# Patient Record
Sex: FEMALE | Race: WHITE | NOT HISPANIC OR LATINO | Employment: OTHER | URBAN - METROPOLITAN AREA
[De-identification: names, ages, dates, MRNs, and addresses within clinical notes are randomized per-mention and may not be internally consistent; named-entity substitution may affect disease eponyms.]

---

## 2017-01-03 ENCOUNTER — HOSPITAL ENCOUNTER (OUTPATIENT)
Dept: MAMMOGRAPHY | Facility: HOSPITAL | Age: 67
Discharge: HOME | End: 2017-01-03
Attending: FAMILY MEDICINE

## 2017-01-03 DIAGNOSIS — Z12.31 VISIT FOR SCREENING MAMMOGRAM: ICD-10-CM

## 2017-01-04 ENCOUNTER — RECORDS - HEALTHEAST (OUTPATIENT)
Dept: ADMINISTRATIVE | Facility: OTHER | Age: 67
End: 2017-01-04

## 2017-01-10 ENCOUNTER — RECORDS - HEALTHEAST (OUTPATIENT)
Dept: ADMINISTRATIVE | Facility: OTHER | Age: 67
End: 2017-01-10

## 2017-01-10 ENCOUNTER — AMBULATORY - HEALTHEAST (OUTPATIENT)
Dept: SCHEDULING | Facility: CLINIC | Age: 67
End: 2017-01-10

## 2017-01-10 DIAGNOSIS — K51.00: ICD-10-CM

## 2017-01-12 ENCOUNTER — RECORDS - HEALTHEAST (OUTPATIENT)
Dept: ADMINISTRATIVE | Facility: OTHER | Age: 67
End: 2017-01-12

## 2017-02-07 ENCOUNTER — OFFICE VISIT - HEALTHEAST (OUTPATIENT)
Dept: FAMILY MEDICINE | Facility: CLINIC | Age: 67
End: 2017-02-07

## 2017-02-07 ENCOUNTER — COMMUNICATION - HEALTHEAST (OUTPATIENT)
Dept: FAMILY MEDICINE | Facility: CLINIC | Age: 67
End: 2017-02-07

## 2017-02-07 DIAGNOSIS — K51.918 ULCERATIVE COLITIS WITH OTHER COMPLICATION, UNSPECIFIED LOCATION (H): ICD-10-CM

## 2017-02-07 DIAGNOSIS — Z01.818 PREOP EXAMINATION: ICD-10-CM

## 2017-02-07 ASSESSMENT — MIFFLIN-ST. JEOR: SCORE: 946.8

## 2017-02-08 ENCOUNTER — COMMUNICATION - HEALTHEAST (OUTPATIENT)
Dept: FAMILY MEDICINE | Facility: CLINIC | Age: 67
End: 2017-02-08

## 2017-02-09 ENCOUNTER — RECORDS - HEALTHEAST (OUTPATIENT)
Dept: ADMINISTRATIVE | Facility: OTHER | Age: 67
End: 2017-02-09

## 2017-02-12 ASSESSMENT — MIFFLIN-ST. JEOR
SCORE: 965.62
SCORE: 968.34

## 2017-02-13 ENCOUNTER — ANESTHESIA - HEALTHEAST (OUTPATIENT)
Dept: SURGERY | Facility: HOSPITAL | Age: 67
End: 2017-02-13

## 2017-02-13 ENCOUNTER — SURGERY - HEALTHEAST (OUTPATIENT)
Dept: SURGERY | Facility: HOSPITAL | Age: 67
End: 2017-02-13

## 2017-02-14 ENCOUNTER — HOME CARE/HOSPICE - HEALTHEAST (OUTPATIENT)
Dept: HOME HEALTH SERVICES | Facility: HOME HEALTH | Age: 67
End: 2017-02-14

## 2017-02-14 ASSESSMENT — MIFFLIN-ST. JEOR: SCORE: 981.95

## 2017-02-15 ASSESSMENT — MIFFLIN-ST. JEOR: SCORE: 983.76

## 2017-02-18 ASSESSMENT — MIFFLIN-ST. JEOR: SCORE: 965.16

## 2017-02-20 ENCOUNTER — COMMUNICATION - HEALTHEAST (OUTPATIENT)
Dept: FAMILY MEDICINE | Facility: CLINIC | Age: 67
End: 2017-02-20

## 2017-02-22 ENCOUNTER — COMMUNICATION - HEALTHEAST (OUTPATIENT)
Dept: FAMILY MEDICINE | Facility: CLINIC | Age: 67
End: 2017-02-22

## 2017-02-23 ENCOUNTER — HOME CARE/HOSPICE - HEALTHEAST (OUTPATIENT)
Dept: HOME HEALTH SERVICES | Facility: HOME HEALTH | Age: 67
End: 2017-02-23

## 2017-02-24 ENCOUNTER — HOME CARE/HOSPICE - HEALTHEAST (OUTPATIENT)
Dept: HOME HEALTH SERVICES | Facility: HOME HEALTH | Age: 67
End: 2017-02-24

## 2017-02-27 ENCOUNTER — HOME CARE/HOSPICE - HEALTHEAST (OUTPATIENT)
Dept: HOME HEALTH SERVICES | Facility: HOME HEALTH | Age: 67
End: 2017-02-27

## 2017-02-27 ENCOUNTER — OFFICE VISIT - HEALTHEAST (OUTPATIENT)
Dept: FAMILY MEDICINE | Facility: CLINIC | Age: 67
End: 2017-02-27

## 2017-02-27 DIAGNOSIS — K51.918 ULCERATIVE COLITIS WITH OTHER COMPLICATION, UNSPECIFIED LOCATION (H): ICD-10-CM

## 2017-02-27 DIAGNOSIS — Z09 HOSPITAL DISCHARGE FOLLOW-UP: ICD-10-CM

## 2017-02-27 ASSESSMENT — MIFFLIN-ST. JEOR: SCORE: 998.96

## 2017-03-02 ENCOUNTER — OFFICE VISIT - HEALTHEAST (OUTPATIENT)
Dept: WOUND CARE | Facility: HOSPITAL | Age: 67
End: 2017-03-02

## 2017-03-02 DIAGNOSIS — K51.00: ICD-10-CM

## 2017-03-04 ENCOUNTER — HOME CARE/HOSPICE - HEALTHEAST (OUTPATIENT)
Dept: HOME HEALTH SERVICES | Facility: HOME HEALTH | Age: 67
End: 2017-03-04

## 2017-03-06 ENCOUNTER — RECORDS - HEALTHEAST (OUTPATIENT)
Dept: ADMINISTRATIVE | Facility: OTHER | Age: 67
End: 2017-03-06

## 2017-03-07 ENCOUNTER — HOME CARE/HOSPICE - HEALTHEAST (OUTPATIENT)
Dept: HOME HEALTH SERVICES | Facility: HOME HEALTH | Age: 67
End: 2017-03-07

## 2017-03-10 ENCOUNTER — HOME CARE/HOSPICE - HEALTHEAST (OUTPATIENT)
Dept: HOME HEALTH SERVICES | Facility: HOME HEALTH | Age: 67
End: 2017-03-10

## 2017-03-17 ENCOUNTER — RECORDS - HEALTHEAST (OUTPATIENT)
Dept: ADMINISTRATIVE | Facility: OTHER | Age: 67
End: 2017-03-17

## 2017-03-20 ENCOUNTER — COMMUNICATION - HEALTHEAST (OUTPATIENT)
Dept: SCHEDULING | Facility: CLINIC | Age: 67
End: 2017-03-20

## 2017-03-31 ENCOUNTER — COMMUNICATION - HEALTHEAST (OUTPATIENT)
Dept: FAMILY MEDICINE | Facility: CLINIC | Age: 67
End: 2017-03-31

## 2017-03-31 DIAGNOSIS — B35.3 TINEA PEDIS, UNSPECIFIED LATERALITY: ICD-10-CM

## 2017-04-01 ENCOUNTER — COMMUNICATION - HEALTHEAST (OUTPATIENT)
Dept: FAMILY MEDICINE | Facility: CLINIC | Age: 67
End: 2017-04-01

## 2017-04-01 DIAGNOSIS — B35.3 TINEA PEDIS, UNSPECIFIED LATERALITY: ICD-10-CM

## 2017-04-10 ENCOUNTER — COMMUNICATION - HEALTHEAST (OUTPATIENT)
Dept: FAMILY MEDICINE | Facility: CLINIC | Age: 67
End: 2017-04-10

## 2017-04-11 ENCOUNTER — RECORDS - HEALTHEAST (OUTPATIENT)
Dept: ADMINISTRATIVE | Facility: OTHER | Age: 67
End: 2017-04-11

## 2017-04-13 ENCOUNTER — COMMUNICATION - HEALTHEAST (OUTPATIENT)
Dept: FAMILY MEDICINE | Facility: CLINIC | Age: 67
End: 2017-04-13

## 2017-04-17 ENCOUNTER — AMBULATORY - HEALTHEAST (OUTPATIENT)
Dept: LAB | Facility: CLINIC | Age: 67
End: 2017-04-17

## 2017-04-17 ENCOUNTER — COMMUNICATION - HEALTHEAST (OUTPATIENT)
Dept: FAMILY MEDICINE | Facility: CLINIC | Age: 67
End: 2017-04-17

## 2017-04-17 ENCOUNTER — RECORDS - HEALTHEAST (OUTPATIENT)
Dept: ADMINISTRATIVE | Facility: OTHER | Age: 67
End: 2017-04-17

## 2017-04-17 DIAGNOSIS — K51.018: ICD-10-CM

## 2017-04-17 DIAGNOSIS — R19.8 GI PROBLEM: ICD-10-CM

## 2017-04-19 ENCOUNTER — AMBULATORY - HEALTHEAST (OUTPATIENT)
Dept: LAB | Facility: CLINIC | Age: 67
End: 2017-04-19

## 2017-04-19 DIAGNOSIS — R19.8 GI PROBLEM: ICD-10-CM

## 2017-04-19 DIAGNOSIS — K51.018: ICD-10-CM

## 2017-05-11 ENCOUNTER — COMMUNICATION - HEALTHEAST (OUTPATIENT)
Dept: FAMILY MEDICINE | Facility: CLINIC | Age: 67
End: 2017-05-11

## 2017-05-15 ENCOUNTER — OFFICE VISIT - HEALTHEAST (OUTPATIENT)
Dept: FAMILY MEDICINE | Facility: CLINIC | Age: 67
End: 2017-05-15
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
## 2017-05-17 ENCOUNTER — COMMUNICATION - HEALTHEAST (OUTPATIENT)
Dept: FAMILY MEDICINE | Facility: CLINIC | Age: 67
End: 2017-05-17

## 2017-05-18 ENCOUNTER — RECORDS - HEALTHEAST (OUTPATIENT)
Dept: ADMINISTRATIVE | Facility: OTHER | Age: 67
End: 2017-05-18

## 2017-05-22 ENCOUNTER — COMMUNICATION - HEALTHEAST (OUTPATIENT)
Dept: FAMILY MEDICINE | Facility: CLINIC | Age: 67
End: 2017-05-22

## 2017-05-31 ENCOUNTER — AMBULATORY - HEALTHEAST (OUTPATIENT)
Dept: SCHEDULING | Facility: CLINIC | Age: 67
End: 2017-05-31

## 2017-05-31 DIAGNOSIS — Z09 FOLLOW UP: ICD-10-CM

## 2017-06-02 ENCOUNTER — COMMUNICATION - HEALTHEAST (OUTPATIENT)
Dept: FAMILY MEDICINE | Facility: CLINIC | Age: 67
End: 2017-06-02

## 2017-06-02 DIAGNOSIS — B35.3 TINEA PEDIS, UNSPECIFIED LATERALITY: ICD-10-CM

## 2017-06-06 ENCOUNTER — RECORDS - HEALTHEAST (OUTPATIENT)
Dept: ADMINISTRATIVE | Facility: OTHER | Age: 67
End: 2017-06-06

## 2017-06-06 ENCOUNTER — OFFICE VISIT - HEALTHEAST (OUTPATIENT)
Dept: WOUND CARE | Facility: HOSPITAL | Age: 67
End: 2017-06-06

## 2017-06-06 DIAGNOSIS — Z09 FOLLOW UP: ICD-10-CM

## 2017-12-08 ENCOUNTER — OFFICE VISIT - HEALTHEAST (OUTPATIENT)
Dept: FAMILY MEDICINE | Facility: CLINIC | Age: 67
End: 2017-12-08

## 2017-12-08 DIAGNOSIS — N61.0 MASTITIS OF RIGHT BREAST UNRELATED TO PREGNANCY OF BREASTFEEDING: ICD-10-CM

## 2017-12-08 ASSESSMENT — MIFFLIN-ST. JEOR: SCORE: 1040.91

## 2018-01-05 ENCOUNTER — COMMUNICATION - HEALTHEAST (OUTPATIENT)
Dept: FAMILY MEDICINE | Facility: CLINIC | Age: 68
End: 2018-01-05

## 2018-01-05 ENCOUNTER — COMMUNICATION - HEALTHEAST (OUTPATIENT)
Dept: SCHEDULING | Facility: CLINIC | Age: 68
End: 2018-01-05

## 2018-01-09 ENCOUNTER — COMMUNICATION - HEALTHEAST (OUTPATIENT)
Dept: FAMILY MEDICINE | Facility: CLINIC | Age: 68
End: 2018-01-09

## 2018-01-11 ENCOUNTER — RECORDS - HEALTHEAST (OUTPATIENT)
Dept: ADMINISTRATIVE | Facility: OTHER | Age: 68
End: 2018-01-11

## 2018-01-16 ENCOUNTER — RECORDS - HEALTHEAST (OUTPATIENT)
Dept: ADMINISTRATIVE | Facility: OTHER | Age: 68
End: 2018-01-16

## 2018-02-01 ENCOUNTER — OFFICE VISIT - HEALTHEAST (OUTPATIENT)
Dept: FAMILY MEDICINE | Facility: CLINIC | Age: 68
End: 2018-02-01

## 2018-02-01 ENCOUNTER — AMBULATORY - HEALTHEAST (OUTPATIENT)
Dept: SCHEDULING | Facility: CLINIC | Age: 68
End: 2018-02-01

## 2018-02-01 DIAGNOSIS — R05.3 CHRONIC COUGH: ICD-10-CM

## 2018-02-01 DIAGNOSIS — M85.80 OSTEOPENIA: ICD-10-CM

## 2018-02-01 ASSESSMENT — MIFFLIN-ST. JEOR: SCORE: 1035.24

## 2018-02-02 LAB
A ALTERNATA IGE QN: <0.35 KU/L
A FUMIGATUS IGE QN: <0.35 KU/L
C HERBARUM IGE QN: <0.35 KU/L
CAT DANDER IGG QN: <0.35 KU/L
COCKSFOOT IGE QN: <0.35 KU/L
COMMON RAGWEED IGE QN: <0.35 KU/L
COTTONWOOD IGE QN: <0.35 KU/L
D FARINAE IGE QN: <0.35 KU/L
D PTERONYSS IGE QN: <0.35 KU/L
DOG DANDER+EPITH IGE QN: <0.35 KU/L
KENT BLUE GRASS IGE QN: <0.35 KU/L
MAPLE IGE QN: <0.35 KU/L
ROACH IGE QN: <0.35 KU/L
SILVER BIRCH IGE QN: <0.35 KU/L
TIMOTHY IGE QN: <0.35 KU/L
TOTAL IGE - HISTORICAL: 24.3 KU/L (ref 0–100)
WHITE ASH IGE QN: <0.35 KU/L
WHITE ELM IGE QN: <0.35 KU/L
WHITE OAK IGE QN: <0.35 KU/L

## 2018-03-19 ENCOUNTER — COMMUNICATION - HEALTHEAST (OUTPATIENT)
Dept: FAMILY MEDICINE | Facility: CLINIC | Age: 68
End: 2018-03-19

## 2018-03-22 ENCOUNTER — COMMUNICATION - HEALTHEAST (OUTPATIENT)
Dept: FAMILY MEDICINE | Facility: CLINIC | Age: 68
End: 2018-03-22

## 2018-04-17 ENCOUNTER — RECORDS - HEALTHEAST (OUTPATIENT)
Dept: ADMINISTRATIVE | Facility: OTHER | Age: 68
End: 2018-04-17

## 2018-08-02 ENCOUNTER — OFFICE VISIT - HEALTHEAST (OUTPATIENT)
Dept: FAMILY MEDICINE | Facility: CLINIC | Age: 68
End: 2018-08-02

## 2018-08-02 DIAGNOSIS — R05.3 CHRONIC COUGH: ICD-10-CM

## 2018-08-02 ASSESSMENT — MIFFLIN-ST. JEOR: SCORE: 1040.91

## 2018-08-03 ENCOUNTER — AMBULATORY - HEALTHEAST (OUTPATIENT)
Dept: PULMONOLOGY | Facility: OTHER | Age: 68
End: 2018-08-03

## 2018-08-03 DIAGNOSIS — R05.9 COUGH: ICD-10-CM

## 2018-08-20 ENCOUNTER — COMMUNICATION - HEALTHEAST (OUTPATIENT)
Dept: FAMILY MEDICINE | Facility: CLINIC | Age: 68
End: 2018-08-20

## 2018-08-30 ENCOUNTER — COMMUNICATION - HEALTHEAST (OUTPATIENT)
Dept: FAMILY MEDICINE | Facility: CLINIC | Age: 68
End: 2018-08-30

## 2018-08-31 ENCOUNTER — COMMUNICATION - HEALTHEAST (OUTPATIENT)
Dept: FAMILY MEDICINE | Facility: CLINIC | Age: 68
End: 2018-08-31

## 2018-09-05 ENCOUNTER — COMMUNICATION - HEALTHEAST (OUTPATIENT)
Dept: SCHEDULING | Facility: CLINIC | Age: 68
End: 2018-09-05

## 2018-09-11 ENCOUNTER — OFFICE VISIT - HEALTHEAST (OUTPATIENT)
Dept: FAMILY MEDICINE | Facility: CLINIC | Age: 68
End: 2018-09-11

## 2018-09-11 DIAGNOSIS — K51.00 ULCERATIVE (CHRONIC) ENTEROCOLITIS (H): ICD-10-CM

## 2018-09-11 DIAGNOSIS — J18.9 PNEUMONIA: ICD-10-CM

## 2018-09-11 ASSESSMENT — MIFFLIN-ST. JEOR: SCORE: 1040.91

## 2018-09-17 ENCOUNTER — HOSPITAL ENCOUNTER (OUTPATIENT)
Dept: CT IMAGING | Facility: HOSPITAL | Age: 68
Discharge: HOME | End: 2018-09-17
Attending: FAMILY MEDICINE

## 2018-09-17 DIAGNOSIS — J18.9 PNEUMONIA: ICD-10-CM

## 2018-09-20 ENCOUNTER — COMMUNICATION - HEALTHEAST (OUTPATIENT)
Dept: FAMILY MEDICINE | Facility: CLINIC | Age: 68
End: 2018-09-20

## 2018-10-01 ENCOUNTER — COMMUNICATION - HEALTHEAST (OUTPATIENT)
Dept: FAMILY MEDICINE | Facility: CLINIC | Age: 68
End: 2018-10-01

## 2018-10-09 ENCOUNTER — OFFICE VISIT - HEALTHEAST (OUTPATIENT)
Dept: FAMILY MEDICINE | Facility: CLINIC | Age: 68
End: 2018-10-09

## 2018-10-09 DIAGNOSIS — J18.9 PNEUMONIA: ICD-10-CM

## 2018-10-09 DIAGNOSIS — K51.00 ULCERATIVE (CHRONIC) ENTEROCOLITIS (H): ICD-10-CM

## 2018-10-09 DIAGNOSIS — Z12.31 VISIT FOR SCREENING MAMMOGRAM: ICD-10-CM

## 2018-10-09 ASSESSMENT — MIFFLIN-ST. JEOR: SCORE: 1059.06

## 2018-11-01 ENCOUNTER — HOSPITAL ENCOUNTER (OUTPATIENT)
Dept: MAMMOGRAPHY | Facility: CLINIC | Age: 68
Discharge: HOME | End: 2018-11-01
Attending: FAMILY MEDICINE

## 2018-11-01 DIAGNOSIS — Z12.31 VISIT FOR SCREENING MAMMOGRAM: ICD-10-CM

## 2018-11-26 ENCOUNTER — COMMUNICATION - HEALTHEAST (OUTPATIENT)
Dept: FAMILY MEDICINE | Facility: CLINIC | Age: 68
End: 2018-11-26

## 2018-12-04 ENCOUNTER — OFFICE VISIT - HEALTHEAST (OUTPATIENT)
Dept: FAMILY MEDICINE | Facility: CLINIC | Age: 68
End: 2018-12-04

## 2018-12-04 DIAGNOSIS — J18.9 PNEUMONIA DUE TO INFECTIOUS ORGANISM, UNSPECIFIED LATERALITY, UNSPECIFIED PART OF LUNG: ICD-10-CM

## 2018-12-04 DIAGNOSIS — R05.3 CHRONIC COUGH: ICD-10-CM

## 2018-12-04 ASSESSMENT — MIFFLIN-ST. JEOR: SCORE: 1059.06

## 2018-12-06 ENCOUNTER — HOSPITAL ENCOUNTER (OUTPATIENT)
Dept: CT IMAGING | Facility: HOSPITAL | Age: 68
Discharge: HOME | End: 2018-12-06
Attending: FAMILY MEDICINE

## 2018-12-06 DIAGNOSIS — R05.3 CHRONIC COUGH: ICD-10-CM

## 2018-12-06 DIAGNOSIS — J18.9 PNEUMONIA DUE TO INFECTIOUS ORGANISM, UNSPECIFIED LATERALITY, UNSPECIFIED PART OF LUNG: ICD-10-CM

## 2018-12-10 ENCOUNTER — COMMUNICATION - HEALTHEAST (OUTPATIENT)
Dept: PULMONOLOGY | Facility: OTHER | Age: 68
End: 2018-12-10

## 2018-12-17 ENCOUNTER — COMMUNICATION - HEALTHEAST (OUTPATIENT)
Dept: SCHEDULING | Facility: CLINIC | Age: 68
End: 2018-12-17

## 2018-12-17 ENCOUNTER — OFFICE VISIT - HEALTHEAST (OUTPATIENT)
Dept: FAMILY MEDICINE | Facility: CLINIC | Age: 68
End: 2018-12-17

## 2018-12-17 DIAGNOSIS — L50.9 HIVES: ICD-10-CM

## 2018-12-17 DIAGNOSIS — R05.3 CHRONIC COUGH: ICD-10-CM

## 2018-12-17 ASSESSMENT — MIFFLIN-ST. JEOR: SCORE: 1059.06

## 2018-12-21 ENCOUNTER — HOSPITAL ENCOUNTER (OUTPATIENT)
Dept: RESPIRATORY THERAPY | Facility: HOSPITAL | Age: 68
Discharge: HOME | End: 2018-12-21
Attending: INTERNAL MEDICINE

## 2018-12-21 DIAGNOSIS — R05.9 COUGH: ICD-10-CM

## 2018-12-21 LAB — HGB BLD-MCNC: 12.2 G/DL (ref 12–16)

## 2019-01-03 ENCOUNTER — OFFICE VISIT - HEALTHEAST (OUTPATIENT)
Dept: PULMONOLOGY | Facility: OTHER | Age: 69
End: 2019-01-03

## 2019-01-03 ENCOUNTER — COMMUNICATION - HEALTHEAST (OUTPATIENT)
Dept: PULMONOLOGY | Facility: OTHER | Age: 69
End: 2019-01-03

## 2019-01-03 DIAGNOSIS — R05.3 CHRONIC COUGH: ICD-10-CM

## 2019-01-03 DIAGNOSIS — R13.10 DYSPHAGIA, UNSPECIFIED TYPE: ICD-10-CM

## 2019-01-03 DIAGNOSIS — J84.9 ILD (INTERSTITIAL LUNG DISEASE) (H): ICD-10-CM

## 2019-01-03 ASSESSMENT — MIFFLIN-ST. JEOR: SCORE: 1057.7

## 2019-01-04 ENCOUNTER — HOSPITAL ENCOUNTER (OUTPATIENT)
Dept: RADIOLOGY | Facility: HOSPITAL | Age: 69
Discharge: HOME | End: 2019-01-04
Attending: INTERNAL MEDICINE

## 2019-01-04 DIAGNOSIS — J84.9 ILD (INTERSTITIAL LUNG DISEASE) (H): ICD-10-CM

## 2019-01-04 DIAGNOSIS — R05.3 CHRONIC COUGH: ICD-10-CM

## 2019-01-04 DIAGNOSIS — R13.10 DYSPHAGIA, UNSPECIFIED TYPE: ICD-10-CM

## 2019-01-08 ENCOUNTER — COMMUNICATION - HEALTHEAST (OUTPATIENT)
Dept: INTENSIVE CARE | Facility: CLINIC | Age: 69
End: 2019-01-08

## 2019-01-17 ENCOUNTER — AMBULATORY - HEALTHEAST (OUTPATIENT)
Dept: LAB | Facility: CLINIC | Age: 69
End: 2019-01-17

## 2019-01-17 DIAGNOSIS — J84.9 ILD (INTERSTITIAL LUNG DISEASE) (H): ICD-10-CM

## 2019-01-17 LAB
C REACTIVE PROTEIN LHE: 0.6 MG/DL (ref 0–0.8)
CCP AB SER IA-ACNC: 0.7 U/ML
RHEUMATOID FACT SERPL-ACNC: <15 IU/ML (ref 0–30)

## 2019-01-21 LAB — ANA SER QL: 1 U

## 2019-01-22 LAB
DNA (DS) ANTIBODY - HISTORICAL: 16 IU
SCL-70 AUTOANTIBODIES - HISTORICAL: 0 EU
SS-A/RO AUTOANTIBODIES - HISTORICAL: 2 EU
SS-B/LA AUTOANTIBODIES - HISTORICAL: 0 EU

## 2019-01-23 ENCOUNTER — COMMUNICATION - HEALTHEAST (OUTPATIENT)
Dept: INTENSIVE CARE | Facility: CLINIC | Age: 69
End: 2019-01-23

## 2019-01-23 ENCOUNTER — COMMUNICATION - HEALTHEAST (OUTPATIENT)
Dept: FAMILY MEDICINE | Facility: CLINIC | Age: 69
End: 2019-01-23

## 2019-01-23 LAB
BACTERIA SPEC CULT: NORMAL
GRAM STAIN RESULT: NORMAL

## 2019-01-24 ENCOUNTER — COMMUNICATION - HEALTHEAST (OUTPATIENT)
Dept: PULMONOLOGY | Facility: OTHER | Age: 69
End: 2019-01-24

## 2019-01-25 ENCOUNTER — COMMUNICATION - HEALTHEAST (OUTPATIENT)
Dept: INTENSIVE CARE | Facility: CLINIC | Age: 69
End: 2019-01-25

## 2019-01-28 LAB — BACTERIA SPEC CULT: NORMAL

## 2019-01-29 ENCOUNTER — AMBULATORY - HEALTHEAST (OUTPATIENT)
Dept: SCHEDULING | Facility: CLINIC | Age: 69
End: 2019-01-29

## 2019-01-29 DIAGNOSIS — Z00.00 EVALUATION BY MEDICAL SERVICE REQUIRED: ICD-10-CM

## 2019-02-05 ENCOUNTER — OFFICE VISIT - HEALTHEAST (OUTPATIENT)
Dept: WOUND CARE | Facility: HOSPITAL | Age: 69
End: 2019-02-05

## 2019-02-05 DIAGNOSIS — Z00.00 EVALUATION BY MEDICAL SERVICE REQUIRED: ICD-10-CM

## 2019-02-21 ENCOUNTER — COMMUNICATION - HEALTHEAST (OUTPATIENT)
Dept: FAMILY MEDICINE | Facility: CLINIC | Age: 69
End: 2019-02-21

## 2019-03-05 LAB
ACID FAST STN SPEC QL: NORMAL
BACTERIA SPEC CULT: NORMAL

## 2019-03-25 ENCOUNTER — HOSPITAL ENCOUNTER (OUTPATIENT)
Dept: CT IMAGING | Facility: HOSPITAL | Age: 69
Discharge: HOME | End: 2019-03-25
Attending: INTERNAL MEDICINE

## 2019-03-25 DIAGNOSIS — J84.9 ILD (INTERSTITIAL LUNG DISEASE) (H): ICD-10-CM

## 2019-04-01 ENCOUNTER — COMMUNICATION - HEALTHEAST (OUTPATIENT)
Dept: FAMILY MEDICINE | Facility: CLINIC | Age: 69
End: 2019-04-01

## 2019-04-02 ENCOUNTER — COMMUNICATION - HEALTHEAST (OUTPATIENT)
Dept: INTENSIVE CARE | Facility: CLINIC | Age: 69
End: 2019-04-02

## 2019-04-23 ENCOUNTER — OFFICE VISIT - HEALTHEAST (OUTPATIENT)
Dept: FAMILY MEDICINE | Facility: CLINIC | Age: 69
End: 2019-04-23

## 2019-04-23 ENCOUNTER — COMMUNICATION - HEALTHEAST (OUTPATIENT)
Dept: FAMILY MEDICINE | Facility: CLINIC | Age: 69
End: 2019-04-23

## 2019-04-23 DIAGNOSIS — K51.919 ULCERATIVE COLITIS WITH COMPLICATION, UNSPECIFIED LOCATION (H): ICD-10-CM

## 2019-04-23 DIAGNOSIS — Z90.49 H/O COLECTOMY: ICD-10-CM

## 2019-04-23 DIAGNOSIS — Z00.00 MEDICARE ANNUAL WELLNESS VISIT, SUBSEQUENT: ICD-10-CM

## 2019-04-23 DIAGNOSIS — R06.00 DYSPNEA, UNSPECIFIED TYPE: ICD-10-CM

## 2019-04-23 DIAGNOSIS — R05.3 CHRONIC COUGH: ICD-10-CM

## 2019-04-23 DIAGNOSIS — M81.8 OTHER OSTEOPOROSIS WITHOUT CURRENT PATHOLOGICAL FRACTURE: ICD-10-CM

## 2019-04-23 LAB
ALBUMIN SERPL-MCNC: 3.7 G/DL (ref 3.5–5)
ALP SERPL-CCNC: 78 U/L (ref 45–120)
ALT SERPL W P-5'-P-CCNC: 12 U/L (ref 0–45)
ANION GAP SERPL CALCULATED.3IONS-SCNC: 10 MMOL/L (ref 5–18)
AST SERPL W P-5'-P-CCNC: 16 U/L (ref 0–40)
BILIRUB SERPL-MCNC: 0.3 MG/DL (ref 0–1)
BUN SERPL-MCNC: 9 MG/DL (ref 8–22)
CALCIUM SERPL-MCNC: 9.3 MG/DL (ref 8.5–10.5)
CALCIUM SERPL-MCNC: 9.4 MG/DL (ref 8.5–10.5)
CHLORIDE BLD-SCNC: 106 MMOL/L (ref 98–107)
CHOLEST SERPL-MCNC: 154 MG/DL
CO2 SERPL-SCNC: 24 MMOL/L (ref 22–31)
CREAT SERPL-MCNC: 0.74 MG/DL (ref 0.6–1.1)
CREAT SERPL-MCNC: 0.75 MG/DL (ref 0.6–1.1)
ERYTHROCYTE [DISTWIDTH] IN BLOOD BY AUTOMATED COUNT: 12.4 % (ref 11–14.5)
FASTING STATUS PATIENT QL REPORTED: YES
GFR SERPL CREATININE-BSD FRML MDRD: >60 ML/MIN/1.73M2
GFR SERPL CREATININE-BSD FRML MDRD: >60 ML/MIN/1.73M2
GLUCOSE BLD-MCNC: 83 MG/DL (ref 70–125)
HBA1C MFR BLD: 5.8 % (ref 3.5–6)
HCT VFR BLD AUTO: 38.1 % (ref 35–47)
HDLC SERPL-MCNC: 47 MG/DL
HGB BLD-MCNC: 12.6 G/DL (ref 12–16)
LDLC SERPL CALC-MCNC: 87 MG/DL
MCH RBC QN AUTO: 29 PG (ref 27–34)
MCHC RBC AUTO-ENTMCNC: 33 G/DL (ref 32–36)
MCV RBC AUTO: 88 FL (ref 80–100)
PHOSPHATE SERPL-MCNC: 3 MG/DL (ref 2.5–4.5)
PLATELET # BLD AUTO: 233 THOU/UL (ref 140–440)
PMV BLD AUTO: 6.4 FL (ref 7–10)
POTASSIUM BLD-SCNC: 3.8 MMOL/L (ref 3.5–5)
PROT SERPL-MCNC: 6.8 G/DL (ref 6–8)
PTH-INTACT SERPL-MCNC: 92 PG/ML (ref 10–86)
RBC # BLD AUTO: 4.35 MILL/UL (ref 3.8–5.4)
SODIUM SERPL-SCNC: 140 MMOL/L (ref 136–145)
TRIGL SERPL-MCNC: 98 MG/DL
TSH SERPL DL<=0.005 MIU/L-ACNC: 1.3 UIU/ML (ref 0.3–5)
VIT B12 SERPL-MCNC: 982 PG/ML (ref 213–816)
WBC: 4.4 THOU/UL (ref 4–11)

## 2019-04-23 ASSESSMENT — MIFFLIN-ST. JEOR: SCORE: 1014.84

## 2019-04-24 LAB
25(OH)D3 SERPL-MCNC: 35.4 NG/ML (ref 30–80)
25(OH)D3 SERPL-MCNC: 35.4 NG/ML (ref 30–80)

## 2019-04-26 ENCOUNTER — OFFICE VISIT - HEALTHEAST (OUTPATIENT)
Dept: PULMONOLOGY | Facility: OTHER | Age: 69
End: 2019-04-26

## 2019-04-26 DIAGNOSIS — R05.3 CHRONIC COUGH: ICD-10-CM

## 2019-05-02 ENCOUNTER — RECORDS - HEALTHEAST (OUTPATIENT)
Dept: ADMINISTRATIVE | Facility: OTHER | Age: 69
End: 2019-05-02

## 2019-05-09 ENCOUNTER — HOSPITAL ENCOUNTER (OUTPATIENT)
Dept: CARDIOLOGY | Facility: HOSPITAL | Age: 69
Discharge: HOME | End: 2019-05-09
Attending: FAMILY MEDICINE

## 2019-05-09 LAB
CV STRESS CURRENT BP HE: NORMAL
CV STRESS CURRENT HR HE: 106
CV STRESS CURRENT HR HE: 109
CV STRESS CURRENT HR HE: 115
CV STRESS CURRENT HR HE: 124
CV STRESS CURRENT HR HE: 124
CV STRESS CURRENT HR HE: 125
CV STRESS CURRENT HR HE: 125
CV STRESS CURRENT HR HE: 126
CV STRESS CURRENT HR HE: 128
CV STRESS CURRENT HR HE: 128
CV STRESS CURRENT HR HE: 133
CV STRESS CURRENT HR HE: 135
CV STRESS CURRENT HR HE: 68
CV STRESS CURRENT HR HE: 69
CV STRESS CURRENT HR HE: 69
CV STRESS CURRENT HR HE: 71
CV STRESS CURRENT HR HE: 71
CV STRESS CURRENT HR HE: 72
CV STRESS CURRENT HR HE: 73
CV STRESS CURRENT HR HE: 77
CV STRESS CURRENT HR HE: 80
CV STRESS CURRENT HR HE: 83
CV STRESS CURRENT HR HE: 99
CV STRESS DEVIATION TIME HE: NORMAL
CV STRESS ECHO PERCENT HR HE: NORMAL
CV STRESS EXERCISE STAGE HE: NORMAL
CV STRESS EXERCISE STAGE REACHED HE: NORMAL
CV STRESS FINAL RESTING BP HE: NORMAL
CV STRESS FINAL RESTING HR HE: 73
CV STRESS MAX HR HE: 145
CV STRESS MAX TREADMILL GRADE HE: 12
CV STRESS MAX TREADMILL SPEED HE: 2.5
CV STRESS PEAK DIA BP HE: NORMAL
CV STRESS PEAK SYS BP HE: NORMAL
CV STRESS PHASE HE: NORMAL
CV STRESS PROTOCOL HE: NORMAL
CV STRESS RESTING PT POSITION HE: NORMAL
CV STRESS RESTING PT POSITION HE: NORMAL
CV STRESS ST DEVIATION AMOUNT HE: NORMAL
CV STRESS ST DEVIATION ELEVATION HE: NORMAL
CV STRESS ST EVELATION AMOUNT HE: NORMAL
CV STRESS TEST TYPE HE: NORMAL
CV STRESS TOTAL STAGE TIME MIN 1 HE: NORMAL
STRESS ECHO BASELINE BP: NORMAL
STRESS ECHO BASELINE HR: 74
STRESS ECHO CALCULATED PERCENT HR: 96 %
STRESS ECHO LAST STRESS BP: NORMAL
STRESS ECHO LAST STRESS HR: 126
STRESS ECHO POST ESTIMATED WORKLOAD: 7.1
STRESS ECHO POST EXERCISE DUR MIN: 5
STRESS ECHO POST EXERCISE DUR SEC: 55
STRESS ECHO TARGET HR: 144.96

## 2019-05-10 ENCOUNTER — COMMUNICATION - HEALTHEAST (OUTPATIENT)
Dept: FAMILY MEDICINE | Facility: CLINIC | Age: 69
End: 2019-05-10

## 2019-05-23 ENCOUNTER — COMMUNICATION - HEALTHEAST (OUTPATIENT)
Dept: FAMILY MEDICINE | Facility: CLINIC | Age: 69
End: 2019-05-23

## 2019-05-23 DIAGNOSIS — M81.0 SENILE OSTEOPOROSIS: ICD-10-CM

## 2019-10-10 ENCOUNTER — AMBULATORY - HEALTHEAST (OUTPATIENT)
Dept: NURSING | Facility: CLINIC | Age: 69
End: 2019-10-10

## 2019-10-10 DIAGNOSIS — Z23 FLU VACCINE NEED: ICD-10-CM

## 2019-10-17 ENCOUNTER — COMMUNICATION - HEALTHEAST (OUTPATIENT)
Dept: SCHEDULING | Facility: CLINIC | Age: 69
End: 2019-10-17
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
## 2019-11-08 ENCOUNTER — HEALTH MAINTENANCE LETTER (OUTPATIENT)
Age: 69
End: 2019-11-08

## 2019-11-18 ENCOUNTER — RECORDS - HEALTHEAST (OUTPATIENT)
Dept: MAMMOGRAPHY | Facility: CLINIC | Age: 69
End: 2019-11-18

## 2019-11-18 DIAGNOSIS — Z12.31 ENCOUNTER FOR SCREENING MAMMOGRAM FOR MALIGNANT NEOPLASM OF BREAST: ICD-10-CM

## 2020-01-30 ENCOUNTER — COMMUNICATION - HEALTHEAST (OUTPATIENT)
Dept: FAMILY MEDICINE | Facility: CLINIC | Age: 70
End: 2020-01-30

## 2020-01-30 ENCOUNTER — COMMUNICATION - HEALTHEAST (OUTPATIENT)
Dept: PULMONOLOGY | Facility: OTHER | Age: 70
End: 2020-01-30

## 2020-02-23 ENCOUNTER — HEALTH MAINTENANCE LETTER (OUTPATIENT)
Age: 70
End: 2020-02-23

## 2020-03-03 ENCOUNTER — COMMUNICATION - HEALTHEAST (OUTPATIENT)
Dept: SCHEDULING | Facility: CLINIC | Age: 70
End: 2020-03-03

## 2020-03-04 ENCOUNTER — COMMUNICATION - HEALTHEAST (OUTPATIENT)
Dept: FAMILY MEDICINE | Facility: CLINIC | Age: 70
End: 2020-03-04

## 2020-03-10 ENCOUNTER — COMMUNICATION - HEALTHEAST (OUTPATIENT)
Dept: FAMILY MEDICINE | Facility: CLINIC | Age: 70
End: 2020-03-10

## 2020-03-10 ENCOUNTER — OFFICE VISIT - HEALTHEAST (OUTPATIENT)
Dept: FAMILY MEDICINE | Facility: CLINIC | Age: 70
End: 2020-03-10

## 2020-03-10 DIAGNOSIS — Z09 HOSPITAL DISCHARGE FOLLOW-UP: ICD-10-CM

## 2020-03-10 DIAGNOSIS — N17.9 ACUTE KIDNEY INJURY (H): ICD-10-CM

## 2020-03-10 DIAGNOSIS — E86.0 DEHYDRATION: ICD-10-CM

## 2020-03-10 DIAGNOSIS — R19.7 DIARRHEA OF PRESUMED INFECTIOUS ORIGIN: ICD-10-CM

## 2020-03-10 DIAGNOSIS — K51.919 ULCERATIVE COLITIS WITH COMPLICATION, UNSPECIFIED LOCATION (H): ICD-10-CM

## 2020-03-10 LAB
ALBUMIN SERPL-MCNC: 3.2 G/DL (ref 3.5–5)
ALP SERPL-CCNC: 64 U/L (ref 45–120)
ALT SERPL W P-5'-P-CCNC: 68 U/L (ref 0–45)
ANION GAP SERPL CALCULATED.3IONS-SCNC: 6 MMOL/L (ref 5–18)
AST SERPL W P-5'-P-CCNC: 26 U/L (ref 0–40)
BILIRUB SERPL-MCNC: 0.3 MG/DL (ref 0–1)
BUN SERPL-MCNC: 15 MG/DL (ref 8–28)
CALCIUM SERPL-MCNC: 8.8 MG/DL (ref 8.5–10.5)
CHLORIDE BLD-SCNC: 108 MMOL/L (ref 98–107)
CO2 SERPL-SCNC: 23 MMOL/L (ref 22–31)
CREAT SERPL-MCNC: 0.82 MG/DL (ref 0.6–1.1)
GFR SERPL CREATININE-BSD FRML MDRD: >60 ML/MIN/1.73M2
GLUCOSE BLD-MCNC: 88 MG/DL (ref 70–125)
POTASSIUM BLD-SCNC: 4 MMOL/L (ref 3.5–5)
PROT SERPL-MCNC: 6.3 G/DL (ref 6–8)
SODIUM SERPL-SCNC: 137 MMOL/L (ref 136–145)

## 2020-03-10 ASSESSMENT — MIFFLIN-ST. JEOR: SCORE: 1037.52

## 2020-04-07 ENCOUNTER — RECORDS - HEALTHEAST (OUTPATIENT)
Dept: ADMINISTRATIVE | Facility: OTHER | Age: 70
End: 2020-04-07

## 2020-05-14 ENCOUNTER — COMMUNICATION - HEALTHEAST (OUTPATIENT)
Dept: FAMILY MEDICINE | Facility: CLINIC | Age: 70
End: 2020-05-14

## 2020-05-26 ENCOUNTER — COMMUNICATION - HEALTHEAST (OUTPATIENT)
Dept: PULMONOLOGY | Facility: OTHER | Age: 70
End: 2020-05-26

## 2020-05-28 ENCOUNTER — COMMUNICATION - HEALTHEAST (OUTPATIENT)
Dept: SCHEDULING | Facility: CLINIC | Age: 70
End: 2020-05-28

## 2020-05-28 ENCOUNTER — OFFICE VISIT - HEALTHEAST (OUTPATIENT)
Dept: FAMILY MEDICINE | Facility: CLINIC | Age: 70
End: 2020-05-28

## 2020-05-28 DIAGNOSIS — R42 DIZZINESS: ICD-10-CM

## 2020-05-28 DIAGNOSIS — R42 LIGHTHEADEDNESS: ICD-10-CM

## 2020-05-28 DIAGNOSIS — H81.13 BENIGN PAROXYSMAL POSITIONAL VERTIGO DUE TO BILATERAL VESTIBULAR DISORDER: ICD-10-CM

## 2020-05-28 LAB
ALBUMIN SERPL-MCNC: 3.8 G/DL (ref 3.5–5)
ALP SERPL-CCNC: 73 U/L (ref 45–120)
ALT SERPL W P-5'-P-CCNC: 10 U/L (ref 0–45)
ANION GAP SERPL CALCULATED.3IONS-SCNC: 10 MMOL/L (ref 5–18)
AST SERPL W P-5'-P-CCNC: 17 U/L (ref 0–40)
BASOPHILS # BLD AUTO: 0 THOU/UL (ref 0–0.2)
BASOPHILS NFR BLD AUTO: 1 % (ref 0–2)
BILIRUB SERPL-MCNC: 0.6 MG/DL (ref 0–1)
BUN SERPL-MCNC: 13 MG/DL (ref 8–28)
CALCIUM SERPL-MCNC: 9.7 MG/DL (ref 8.5–10.5)
CHLORIDE BLD-SCNC: 106 MMOL/L (ref 98–107)
CO2 SERPL-SCNC: 21 MMOL/L (ref 22–31)
CREAT SERPL-MCNC: 0.78 MG/DL (ref 0.6–1.1)
EOSINOPHIL # BLD AUTO: 0.1 THOU/UL (ref 0–0.4)
EOSINOPHIL NFR BLD AUTO: 2 % (ref 0–6)
ERYTHROCYTE [DISTWIDTH] IN BLOOD BY AUTOMATED COUNT: 13.4 % (ref 11–14.5)
GFR SERPL CREATININE-BSD FRML MDRD: >60 ML/MIN/1.73M2
GLUCOSE BLD-MCNC: 94 MG/DL (ref 70–125)
HCT VFR BLD AUTO: 39.2 % (ref 35–47)
HGB BLD-MCNC: 13.2 G/DL (ref 12–16)
LYMPHOCYTES # BLD AUTO: 1.2 THOU/UL (ref 0.8–4.4)
LYMPHOCYTES NFR BLD AUTO: 21 % (ref 20–40)
MCH RBC QN AUTO: 30 PG (ref 27–34)
MCHC RBC AUTO-ENTMCNC: 33.8 G/DL (ref 32–36)
MCV RBC AUTO: 89 FL (ref 80–100)
MONOCYTES # BLD AUTO: 0.4 THOU/UL (ref 0–0.9)
MONOCYTES NFR BLD AUTO: 6 % (ref 2–10)
NEUTROPHILS # BLD AUTO: 4.2 THOU/UL (ref 2–7.7)
NEUTROPHILS NFR BLD AUTO: 71 % (ref 50–70)
PLATELET # BLD AUTO: 243 THOU/UL (ref 140–440)
PMV BLD AUTO: 6.8 FL (ref 7–10)
POTASSIUM BLD-SCNC: 4.1 MMOL/L (ref 3.5–5)
PROT SERPL-MCNC: 7.6 G/DL (ref 6–8)
RBC # BLD AUTO: 4.42 MILL/UL (ref 3.8–5.4)
SODIUM SERPL-SCNC: 137 MMOL/L (ref 136–145)
WBC: 5.9 THOU/UL (ref 4–11)

## 2020-05-28 ASSESSMENT — MIFFLIN-ST. JEOR: SCORE: 1042.06

## 2020-05-31 ENCOUNTER — AMBULATORY - HEALTHEAST (OUTPATIENT)
Dept: INTENSIVE CARE | Facility: CLINIC | Age: 70
End: 2020-05-31

## 2020-06-02 ENCOUNTER — OFFICE VISIT - HEALTHEAST (OUTPATIENT)
Dept: PHYSICAL THERAPY | Facility: REHABILITATION | Age: 70
End: 2020-06-02

## 2020-06-02 DIAGNOSIS — H81.11 BPPV (BENIGN PAROXYSMAL POSITIONAL VERTIGO), RIGHT: ICD-10-CM

## 2020-06-05 ENCOUNTER — OFFICE VISIT - HEALTHEAST (OUTPATIENT)
Dept: PHYSICAL THERAPY | Facility: REHABILITATION | Age: 70
End: 2020-06-05

## 2020-06-05 DIAGNOSIS — H81.11 BPPV (BENIGN PAROXYSMAL POSITIONAL VERTIGO), RIGHT: ICD-10-CM

## 2020-07-08 ENCOUNTER — COMMUNICATION - HEALTHEAST (OUTPATIENT)
Dept: FAMILY MEDICINE | Facility: CLINIC | Age: 70
End: 2020-07-08

## 2020-07-08 DIAGNOSIS — M81.0 SENILE OSTEOPOROSIS: ICD-10-CM

## 2020-08-10 ENCOUNTER — COMMUNICATION - HEALTHEAST (OUTPATIENT)
Dept: FAMILY MEDICINE | Facility: CLINIC | Age: 70
End: 2020-08-10

## 2020-08-26 ENCOUNTER — COMMUNICATION - HEALTHEAST (OUTPATIENT)
Dept: FAMILY MEDICINE | Facility: CLINIC | Age: 70
End: 2020-08-26

## 2020-08-28 ENCOUNTER — RECORDS - HEALTHEAST (OUTPATIENT)
Dept: ADMINISTRATIVE | Facility: OTHER | Age: 70
End: 2020-08-28

## 2020-10-13 ENCOUNTER — COMMUNICATION - HEALTHEAST (OUTPATIENT)
Dept: FAMILY MEDICINE | Facility: CLINIC | Age: 70
End: 2020-10-13

## 2020-12-06 ENCOUNTER — HEALTH MAINTENANCE LETTER (OUTPATIENT)
Age: 70
End: 2020-12-06

## 2021-01-21 ENCOUNTER — RECORDS - HEALTHEAST (OUTPATIENT)
Dept: MAMMOGRAPHY | Facility: CLINIC | Age: 71
End: 2021-01-21

## 2021-01-21 DIAGNOSIS — Z12.31 ENCOUNTER FOR SCREENING MAMMOGRAM FOR MALIGNANT NEOPLASM OF BREAST: ICD-10-CM

## 2021-03-08 ENCOUNTER — COMMUNICATION - HEALTHEAST (OUTPATIENT)
Dept: FAMILY MEDICINE | Facility: CLINIC | Age: 71
End: 2021-03-08

## 2021-03-10 ENCOUNTER — COMMUNICATION - HEALTHEAST (OUTPATIENT)
Dept: FAMILY MEDICINE | Facility: CLINIC | Age: 71
End: 2021-03-10

## 2021-03-12 ENCOUNTER — COMMUNICATION - HEALTHEAST (OUTPATIENT)
Dept: FAMILY MEDICINE | Facility: CLINIC | Age: 71
End: 2021-03-12

## 2021-03-12 ENCOUNTER — COMMUNICATION - HEALTHEAST (OUTPATIENT)
Dept: PULMONOLOGY | Facility: OTHER | Age: 71
End: 2021-03-12

## 2021-03-18 ENCOUNTER — AMBULATORY - HEALTHEAST (OUTPATIENT)
Dept: SCHEDULING | Facility: CLINIC | Age: 71
End: 2021-03-18

## 2021-03-18 ENCOUNTER — OFFICE VISIT - HEALTHEAST (OUTPATIENT)
Dept: FAMILY MEDICINE | Facility: CLINIC | Age: 71
End: 2021-03-18

## 2021-03-18 DIAGNOSIS — J47.9 BRONCHIECTASIS WITHOUT COMPLICATION (H): ICD-10-CM

## 2021-03-18 DIAGNOSIS — Z78.0 POSTMENOPAUSAL: ICD-10-CM

## 2021-03-18 DIAGNOSIS — M81.8 OTHER OSTEOPOROSIS WITHOUT CURRENT PATHOLOGICAL FRACTURE: ICD-10-CM

## 2021-03-18 DIAGNOSIS — K51.919 ULCERATIVE COLITIS WITH COMPLICATION, UNSPECIFIED LOCATION (H): ICD-10-CM

## 2021-03-18 DIAGNOSIS — Z00.00 MEDICARE ANNUAL WELLNESS VISIT, SUBSEQUENT: ICD-10-CM

## 2021-03-18 DIAGNOSIS — Z13.220 ENCOUNTER FOR SCREENING FOR LIPOID DISORDERS: ICD-10-CM

## 2021-03-18 DIAGNOSIS — D64.9 NORMOCYTIC ANEMIA: ICD-10-CM

## 2021-03-18 LAB
ALBUMIN SERPL-MCNC: 3.8 G/DL (ref 3.5–5)
ALP SERPL-CCNC: 61 U/L (ref 45–120)
ALT SERPL W P-5'-P-CCNC: 12 U/L (ref 0–45)
ANION GAP SERPL CALCULATED.3IONS-SCNC: 10 MMOL/L (ref 5–18)
AST SERPL W P-5'-P-CCNC: 20 U/L (ref 0–40)
BILIRUB SERPL-MCNC: 0.6 MG/DL (ref 0–1)
BUN SERPL-MCNC: 16 MG/DL (ref 8–28)
CALCIUM SERPL-MCNC: 9.1 MG/DL (ref 8.5–10.5)
CHLORIDE BLD-SCNC: 108 MMOL/L (ref 98–107)
CHOLEST SERPL-MCNC: 187 MG/DL
CO2 SERPL-SCNC: 24 MMOL/L (ref 22–31)
CREAT SERPL-MCNC: 0.84 MG/DL (ref 0.6–1.1)
ERYTHROCYTE [DISTWIDTH] IN BLOOD BY AUTOMATED COUNT: 12.7 % (ref 11–14.5)
FASTING STATUS PATIENT QL REPORTED: YES
GFR SERPL CREATININE-BSD FRML MDRD: >60 ML/MIN/1.73M2
GLUCOSE BLD-MCNC: 83 MG/DL (ref 70–125)
HCT VFR BLD AUTO: 40.8 % (ref 35–47)
HDLC SERPL-MCNC: 62 MG/DL
HGB BLD-MCNC: 13.1 G/DL (ref 12–16)
LDLC SERPL CALC-MCNC: 112 MG/DL
MCH RBC QN AUTO: 29.1 PG (ref 27–34)
MCHC RBC AUTO-ENTMCNC: 32.1 G/DL (ref 32–36)
MCV RBC AUTO: 91 FL (ref 80–100)
PLATELET # BLD AUTO: 172 THOU/UL (ref 140–440)
PMV BLD AUTO: 8.8 FL (ref 7–10)
POTASSIUM BLD-SCNC: 4.4 MMOL/L (ref 3.5–5)
PROT SERPL-MCNC: 6.6 G/DL (ref 6–8)
RBC # BLD AUTO: 4.5 MILL/UL (ref 3.8–5.4)
SODIUM SERPL-SCNC: 142 MMOL/L (ref 136–145)
TRIGL SERPL-MCNC: 63 MG/DL
WBC: 4.4 THOU/UL (ref 4–11)

## 2021-03-18 ASSESSMENT — MIFFLIN-ST. JEOR: SCORE: 1090.14

## 2021-03-19 LAB
25(OH)D3 SERPL-MCNC: 30.1 NG/ML (ref 30–80)
25(OH)D3 SERPL-MCNC: 30.1 NG/ML (ref 30–80)

## 2021-04-11 ENCOUNTER — HEALTH MAINTENANCE LETTER (OUTPATIENT)
Age: 71
End: 2021-04-11

## 2021-05-02 ENCOUNTER — RECORDS - HEALTHEAST (OUTPATIENT)
Dept: ADMINISTRATIVE | Facility: OTHER | Age: 71
End: 2021-05-02

## 2021-05-19 ENCOUNTER — RECORDS - HEALTHEAST (OUTPATIENT)
Dept: ADMINISTRATIVE | Facility: OTHER | Age: 71
End: 2021-05-19

## 2021-05-24 ENCOUNTER — COMMUNICATION - HEALTHEAST (OUTPATIENT)
Dept: FAMILY MEDICINE | Facility: CLINIC | Age: 71
End: 2021-05-24

## 2021-05-24 DIAGNOSIS — K51.918: ICD-10-CM

## 2021-05-24 DIAGNOSIS — K51.919 ULCERATIVE COLITIS WITH COMPLICATION, UNSPECIFIED LOCATION (H): ICD-10-CM

## 2021-05-24 DIAGNOSIS — Z90.49 STATUS POST COLON RESECTION: ICD-10-CM

## 2021-05-25 ENCOUNTER — RECORDS - HEALTHEAST (OUTPATIENT)
Dept: ADMINISTRATIVE | Facility: OTHER | Age: 71
End: 2021-05-25

## 2021-05-27 NOTE — TELEPHONE ENCOUNTER
Called the patient to review her CT scan results - which is suggestive of an organizing PNA given the migratory opacities, especially given her increased risk with IBD.     Her cough is stable. I again told her what I thought was the likely diagnosis, and the options of bronchoscopy to rule out infection and try to establish a definitive diagnosis, versus empiric prednisone understanding the risks of worsening an un-identified infection (she previously declined to do either of these) and in some cases watchful waiting given the mild symptoms and normal lung function testing.     She continues to have a great deal of anxiety and continues to search for another explanation/diagnosis - she asked if this was IPF, which based on her imaging I told her it was not consistent with this. She raised the question of another opinion, and I told her I can certainly send any test results/notes to whomever she requests.    She made to decisions based on this call and would like to discuss again at our visit 4/26.        Tonya Mark MD  Pulmonary and Critical Care Medicine  [facility]  Office: [phone]  Pager: [phone]

## 2021-05-28 NOTE — PATIENT INSTRUCTIONS - HE
It was a pleasure seeing you today.    Please let me know how your visit to [place] [facility] goes, and if you would like to follow-up here again.        Tonya Mark MD  Pulmonary and Critical Care Medicine  [facility]  Office: [phone]  Pager: [phone]

## 2021-05-28 NOTE — TELEPHONE ENCOUNTER
----- Message from Giovanna Parrish MD sent at 5/10/2019  6:28 AM CDT -----  Can let her know the stress test was normal, so no cardiac cause for shortness of breath.
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
## 2021-05-28 NOTE — PROGRESS NOTES
Assessment and Plan:       1. Medicare annual wellness visit, subsequent  As far as healthcare maintenance, she is up-to-date on mammogram.  She does not need colonoscopies anymore she has had a colectomy for ulcerative colitis.  She is up-to-date on immunizations.  We did discuss the new shingles vaccine and she will think about it.  She is due for fasting blood work today.  She had some hyperglycemia in the past so we will check an A1c today.  PHQ 2 equals 0  miniCOG 5 out of 5  - Comprehensive Metabolic Panel  - Glycosylated Hemoglobin A1c  - HM2(CBC w/o Differential)  - Lipid Cascade    2. Ulcerative colitis with complication, unspecified location (H)  Patient had a fairly severe episode of ulcerative colitis several years ago and has now had a colectomy.  She did have pretty severe malnutrition which has improved.    - Vitamin B12    3. H/O colectomy  No need for further colonoscopies.    4. Other osteoporosis without current pathological fracture  Patient had a bone density that showed severe osteoporosis last year.  I had talked to patient on the phone about starting Fosamax and I thought she was agreeable.  She states when she read the side effects, she was afraid to start it.  We discussed the risks of fracture and decreased mobility with untreated osteoporosis.  She is willing to try the Fosamax.  Would also recommend increasing her activity level.  - Thyroid Stimulating Hormone (TSH)  - Vitamin D, Total (25-Hydroxy)  - Parathyroid Hormone Intact with Minerals    5. Chronic cough  Patient has a chronic cough and is now been evaluated by pulmonary.  They think that she has some type of organizing pneumonia.  She has a follow-up with them later this week.  Her symptoms are fairly stable at this point.    6. Dyspnea, unspecified type  Patient is having some occasional dyspnea.  She does not think it is necessarily related to exertion.  It could be related to her organizing pneumonia.  She is worried about  coronary artery disease as they had mentioned on her chest CT that she had calcification of her LAD.  She is not having any chest pain.  She is just very anxious about this and we discussed options for testing.  I think a stress test would be appropriate, but would not recommend anything invasive at this point.  - Stress Test Graded     The patient's current medical problems were reviewed.    The following health maintenance schedule was reviewed with the patient and provided in printed form in the after visit summary:   Health Maintenance   Topic Date Due     ADVANCE DIRECTIVES DISCUSSED WITH PATIENT  12/22/1968     ZOSTER VACCINES (1 of 2) 12/22/1999     FALL RISK ASSESSMENT  08/02/2019     DXA SCAN  03/12/2020     MAMMOGRAM  11/01/2020     TD 18+ HE  02/11/2024     PNEUMOCOCCAL POLYSACCHARIDE VACCINE AGE 65 AND OVER  Completed     INFLUENZA VACCINE RULE BASED  Completed     PNEUMOCOCCAL CONJUGATE VACCINE FOR ADULTS (PCV13 OR PREVNAR)  Completed     COLONOSCOPY  Discontinued        Subjective:   Chief Complaint: Lauren Dhaliwal is an 68 y.o. female here for an Annual Wellness visit.   HPI: Overall, it has been somewhat of a tough year for patient.  She is been dealing with this chronic cough and recurrent pneumonia for over a year.  We had done testing including chest x-ray is and CTs.  We had tried multiple antibiotics.  I eventually had her see pulmonary and they thought this was likely organizing pneumonia.  She is doing fairly well without antibiotics currently.  They have talked her about doing a bronchoscopy but she was a little bit anxious about this.  She has follow-up with them next week.  She has been having some dyspnea and she is really worried about coronary artery disease.  She had a chest CT done that showed some calcification of the LAD.  She denies chest pain with exertion.  She states that shortness of breath tends to be just random, not necessarily related to activity.  As far as risk factors,  she is a never smoker.  She was exposed to secondhand smoke.  She has not had hypertension or diabetes.  She had a bone density last year which showed severe osteoporosis.  I did talk to her on the phone about starting Fosamax and thought that she was agreeable.  She states that she read the potential side effects and was afraid to take it.  We reviewed these today and I strongly that she give it a try.  Discussed the risks of not treating her osteoporosis and fracture risk.  She is taking calcium and vitamin D.  He had a colectomy for severe ulcerative colitis.  She feels like she has been eating fairly healthy.  She is getting regular eye and dental visits.  I did recommend increasing amount of physical activity.    Review of Systems:    Please see above.  The rest of the review of systems are negative for all systems.    Patient Care Team:  Giovanna Parrish MD as PCP - General (Family Medicine)     Patient Active Problem List   Diagnosis     Other osteoporosis without current pathological fracture     Ulcerative colitis with complication, unspecified location (H) - s/p colectomy     Hyperglycemia     Normocytic anemia     Past Medical History:   Diagnosis Date     Gallstones      Ulcerative (chronic) enterocolitis (H)       Past Surgical History:   Procedure Laterality Date     ABDOMINOPERINEAL PROCTOCOLECTOMY N/A 2/13/2017    Procedure: LAPAROSCOPIC ASSISTED TOTAL PROCTOCOLECTOMY WITH ILEOSTOMY, AND PROCYOSCOPY;  Surgeon: Krishna Christensen MD;  [place] [facility] [place]:      AUGMENTATION MAMMAPLASTY  1979     [facility]  2/13/2017          [id number]      Colectomy with colostomy     TONSILLECTOMY        Family History   Problem Relation Age of Onset     Colon cancer Mother      Breast cancer Neg Hx       Social History     Socioeconomic History     Marital status:      Spouse name: Not on file     Number of children: Not on file     Years of education: Not on file     Highest education level: Not on  "file   Occupational History     Not on file   Social Needs     Financial resource strain: Not on file     Food insecurity:     Worry: Not on file     Inability: Not on file     Transportation needs:     Medical: Not on file     Non-medical: Not on file   Tobacco Use     Smoking status: Never Smoker     Smokeless tobacco: Never Used   Substance and Sexual Activity     Alcohol use: No     Drug use: No     Sexual activity: Not on file   Lifestyle     Physical activity:     Days per week: Not on file     Minutes per session: Not on file     Stress: Not on file   Relationships     Social connections:     Talks on phone: Not on file     Gets together: Not on file     Attends Yazdanism service: Not on file     Active member of club or organization: Not on file     Attends meetings of clubs or organizations: Not on file     Relationship status: Not on file     Intimate partner violence:     Fear of current or ex partner: Not on file     Emotionally abused: Not on file     Physically abused: Not on file     Forced sexual activity: Not on file   Other Topics Concern     Not on file   Social History Narrative     Not on file      Current Outpatient Medications   Medication Sig Dispense Refill     alendronate (FOSAMAX) 70 MG tablet Take 70 mg by mouth every 7 days. Take in the morning on an empty stomach with a full glass of water 30 minutes before food       aspirin-calcium carbonate 81 mg-300 mg calcium(777 mg) Tab Take by mouth as needed.       levalbuterol (XOPENEX HFA) 45 mcg/actuation inhaler Inhale 1-2 puffs every 4 (four) hours as needed for wheezing. 1 Inhaler 12     multivitamin,therapeutic (THERAPEUTIC MULTIVITAMIN ORAL) Take 1 tablet by mouth daily as needed.              No current facility-administered medications for this visit.       Objective:   Vital Signs:   Visit Vitals  /62   Pulse 76   Temp 97.6  F (36.4  C)   Resp 16   Ht 5' 1\" (1.549 m)   Wt 124 lb (56.2 kg)   BMI 23.43 kg/m     "     VisionScreening:  No exam data present     PHYSICAL EXAM    Physical Exam:  General Appearance: Alert, cooperative, no distress, appears stated age  Head: Normocephalic, without obvious abnormality, atraumatic  Eyes: PERRL, conjunctiva/corneas clear, EOM's intact  Ears: Normal TM's and external ear canals, both ears  Nose: Nares normal, septum midline,mucosa normal, no drainage  Throat: Lips, mucosa, and tongue normal; teeth and gums normal  Neck: Supple, symmetrical, trachea midline, no adenopathy; thyroid: not enlarged, symmetric, no tenderness/mass/nodules; no carotid bruit  Back: Symmetric, no curvature, ROM normal, no CVA tenderness  Lungs: Clear to auscultation bilaterally, respirations unlabored  Breasts: No breast masses, tenderness, asymmetry, or nipple discharge, bilateral implants  Heart: Regular rate and rhythm, S1 and S2 normal, no murmur, rub, or gallop,  Abdomen: Soft, non-tender, bowel sounds active all four quadrants,  no masses, no organomegaly, colostomy.  Extremities: Extremities normal, atraumatic, no cyanosis or edema  Skin: Skin color, texture, turgor normal, no rashes or lesions  Lymph nodes: Cervical, supraclavicular, and axillary nodes normal  Neurologic: Normal          Assessment Results 4/23/2019   Activities of Daily Living No help needed   Instrumental Activities of Daily Living No help needed   Mini Cog Total Score 5   Some recent data might be hidden     A Mini-Cog score of 0-2 suggests the possibility of dementia, score of 3-5 suggests no dementia    Identified Health Risks:     She is at risk for lack of exercise and has been provided with information to increase physical activity for the benefit of her well-being.  The patient was counseled and encouraged to consider modifying their diet and eating habits. She was provided with information on recommended healthy diet options.  The patient reports that she does not have all recommended working emergency equipment available. She  was provided with information about emergency preparedness, including smoke detectors.  Information regarding advance directives (living gama), including where she can download the appropriate form, was provided to the patient via the AVS.

## 2021-05-28 NOTE — TELEPHONE ENCOUNTER
Reason contacted:  Stress test results  Information relayed:  MD message below  Additional questions:  No  Further follow-up needed:  No  Okay to leave a detailed message:  LENIN

## 2021-05-30 VITALS — WEIGHT: 118.75 LBS | HEIGHT: 62 IN | BODY MASS INDEX: 21.85 KG/M2

## 2021-05-30 VITALS — WEIGHT: 109 LBS | HEIGHT: 61 IN | BODY MASS INDEX: 20.58 KG/M2

## 2021-05-30 VITALS — WEIGHT: 111.3 LBS | HEIGHT: 62 IN | BODY MASS INDEX: 20.48 KG/M2

## 2021-05-30 VITALS — WEIGHT: 128.8 LBS | BODY MASS INDEX: 23.94 KG/M2

## 2021-05-31 VITALS — WEIGHT: 126.75 LBS | BODY MASS INDEX: 23.32 KG/M2 | HEIGHT: 62 IN

## 2021-05-31 VITALS — HEIGHT: 62 IN | WEIGHT: 128 LBS | BODY MASS INDEX: 23.55 KG/M2

## 2021-06-01 VITALS — HEIGHT: 62 IN | WEIGHT: 132 LBS | BODY MASS INDEX: 24.29 KG/M2

## 2021-06-01 VITALS — HEIGHT: 62 IN | BODY MASS INDEX: 23.55 KG/M2 | WEIGHT: 128 LBS

## 2021-06-01 VITALS — WEIGHT: 128 LBS | HEIGHT: 62 IN | BODY MASS INDEX: 23.55 KG/M2

## 2021-06-02 VITALS — WEIGHT: 132 LBS | HEIGHT: 62 IN | BODY MASS INDEX: 24.29 KG/M2

## 2021-06-02 VITALS — HEIGHT: 62 IN | WEIGHT: 132 LBS | BODY MASS INDEX: 24.29 KG/M2

## 2021-06-02 VITALS — HEIGHT: 62 IN | WEIGHT: 131.7 LBS | BODY MASS INDEX: 24.24 KG/M2

## 2021-06-02 VITALS — HEIGHT: 61 IN | WEIGHT: 124 LBS | BODY MASS INDEX: 23.41 KG/M2

## 2021-06-02 VITALS — WEIGHT: 129.4 LBS | BODY MASS INDEX: 24.45 KG/M2

## 2021-06-02 NOTE — TELEPHONE ENCOUNTER
Would only recommend antibiotics if confirmed deer tick and has been on her for more than 36 hours.  Otherwise, we just monitor for symptoms.

## 2021-06-02 NOTE — TELEPHONE ENCOUNTER
"  Call from pt       CC:  R shoulder  - behind R shoulder       She is requesting ABX       > Found about 30 min ago - was \"flat\" (not swollen)    > \"Smaller than a deer tick\"    > Red bite cyndi  - no ring   > No fever   > Bite cyndi area somewhat tender     Confirmed [facility] on [address]      Tele# [phone]        Clindamycin - itchy    PCNs - Hives and swelling     [facility] - itchy        A/P:   > Keeping wound clean   > I will ask about ABX  and have staff call you back        Kelechi Elias RN   Triage and Medication Refills        Reason for Disposition    Tick bite with no complications    Protocols used: TICK BITE-A-OH      "

## 2021-06-02 NOTE — TELEPHONE ENCOUNTER
I spoke with Lauren.  She still has the tick with her - she's states it's smaller than the deer ticks she's seen in the past. She states that it potentially could have been on her for 36+ hours. The site is just red like a mosquito bite. She states that her joints/muscles are always sore from her arthritis but not any MORE sore than usual. She states that in the past you had given her meds so she wanted me to double check with you.     Is your recommendation that same?

## 2021-06-04 VITALS
OXYGEN SATURATION: 97 % | HEART RATE: 76 BPM | DIASTOLIC BLOOD PRESSURE: 78 MMHG | HEIGHT: 61 IN | SYSTOLIC BLOOD PRESSURE: 123 MMHG | WEIGHT: 130 LBS | BODY MASS INDEX: 24.55 KG/M2

## 2021-06-04 VITALS
BODY MASS INDEX: 24.35 KG/M2 | WEIGHT: 129 LBS | DIASTOLIC BLOOD PRESSURE: 68 MMHG | HEIGHT: 61 IN | HEART RATE: 69 BPM | SYSTOLIC BLOOD PRESSURE: 110 MMHG | RESPIRATION RATE: 16 BRPM

## 2021-06-05 VITALS
RESPIRATION RATE: 16 BRPM | WEIGHT: 140.6 LBS | BODY MASS INDEX: 26.55 KG/M2 | HEIGHT: 61 IN | HEART RATE: 57 BPM | SYSTOLIC BLOOD PRESSURE: 121 MMHG | DIASTOLIC BLOOD PRESSURE: 65 MMHG

## 2021-06-05 NOTE — TELEPHONE ENCOUNTER
Dr. Parrish-  See pt's IGIGIt message.  Last CT scan from 3/25/19 states:    CONCLUSION:   1.  There are increased opacities at the left lung base which likely represent an infectious process or aspiration.   2.  Lower lung bronchiectasis is again seen. There is debris in the airways to the lower lung zones.  3.  Nodular opacities in the right lower lobe have decreased and represent a resolving infectious or inflammatory process

## 2021-06-06 NOTE — TELEPHONE ENCOUNTER
" Isaac is calling and states that on Sunday morning 3:00am got the flu.  Lauren feels that she is dehydrated.  Lauren states that she has an illeostomy and \"water  keeps coming out of illeostomy.\"  Lauren feels dizziness also.        Reason for Disposition    [1] Drinking very little AND [2] dehydration suspected (e.g., no urine > 12 hours, very dry mouth, very lightheaded)    Protocols used: OSTOMY SYMPTOMS AND [id number]      "

## 2021-06-06 NOTE — PROGRESS NOTES
Assessment/ Plan     1. Hospital discharge follow-up  Patient was hospitalized March 4 through March 8 for dehydration related to profuse diarrhea thought to be viral.  She had an ER visit for fluids on the third and then returned the following day and was admitted.  She is status post colectomy from ulcerative colitis.  She was having profuse watery diarrhea.  Her work-up was negative for treatable infectious etiologies, so the thought was this was likely viral.  She had elevated creatinine on admission and low potassium and magnesium.  These were corrected over the course of her hospitalization.  On the day of discharge, she was feeling much better.  She has been feeling well at home as well.  We discussed some foods to avoid for the next couple of weeks such as fatty greasy foods, dairy, etc.  We will recheck her electrolytes and her kidney function.  She has plans to follow-up with colorectal surgery.    2. Dehydration  Improved, patient is well-hydrated today.  - Comprehensive Metabolic Panel    3. Diarrhea of presumed infectious origin  All stool samples were negative.  This was presumed to be viral, they did not think this was due to ulcerative colitis.    4. Ulcerative colitis with complication, unspecified location (H) - s/p colectomy  Patient is status post colectomy.  She has follow-up with colorectal in the near future.    5. Acute kidney injury (H)  This improved with hydration.      Subjective:       Lauren Dhaliwal is a 69 y.o. female who presents for hospital follow-up.  Patient states several days prior to admission, she started to have some abdominal cramping and some diarrhea.  She was having profuse watery output from her colostomy bag.  She did not have any fever or noticed any blood.  She was getting pretty dehydrated so she went in for fluids at the emergency room on the third.  She felt better for about a day or so but then symptoms returned and she started to feel dehydrated again.  She went  "back to [facility] and she was admitted for several days.  They did a work-up including stool samples which were all negative for parasites, bacteria, and C. difficile.  It was thought that this was likely viral.  She did have an elevated creatinine of 2.5 at admission.  This responded to fluids.  She had low potassium and magnesium as well.  These were corrected and normal at discharge.  Since she has been home, she is not having any diarrhea.  Her appetite has been slow to recover.  We talked about adding a protein shake twice daily to try to increase her protein intake temporarily.  We discussed foods to avoid such as dairy or greasy foods.  There was no change in her medication when she was in the hospital.  Post Discharge Medication Reconciliation Status: discharge medications reconciled, continue medications without change      Relevant past medical, family, surgical, and social history reviewed with patient, unless noted in HPI, not pertinent for this visit.  Medications were discussed and reconciled.   Review of Systems   A 12 point comprehensive review of systems was negative except as noted.      Current Outpatient Medications   Medication Sig Dispense Refill     sodium chloride 0.9 % nebulizer solution Inhale 3 mL 2 (two) times a day.       vit C/E/zinc ox/marely/lut/zeax (ICAPS AREDS2 ORAL) Take by mouth. Focus select AREDS2 - 1 twice daily       acetaminophen (TYLENOL) 325 MG tablet Take 325-650 mg by mouth every 6 (six) hours as needed for pain.       alendronate (FOSAMAX) 70 MG tablet Take 1 tablet (70 mg total) by mouth every 7 days. Take in the morning on an empty stomach with a full glass of water 30 minutes before food 12 tablet 3     No current facility-administered medications for this visit.        Objective:      /68   Pulse 69   Resp 16   Ht 5' 1\" (1.549 m)   Wt 129 lb (58.5 kg)   BMI 24.37 kg/m        General appearance: alert, appears stated age and cooperative  Lungs: clear to " auscultation bilaterally  Heart: regular rate and rhythm, S1, S2 normal, no murmur, click, rub or gallop  Abdomen: soft, non-tender; bowel sounds normal; no masses,  no organomegaly      Recent Results (from the past 168 hour(s))   Basic Metabolic Panel   Result Value Ref Range    Sodium 133 (L) 136 - 145 mmol/L    Potassium 4.0 3.5 - 5.0 mmol/L    Chloride 104 98 - 107 mmol/L    CO2 17 (L) 22 - 31 mmol/L    Anion Gap, Calculation 12 5 - 18 mmol/L    Glucose 106 70 - 125 mg/dL    Calcium 8.0 (L) 8.5 - 10.5 mg/dL    BUN 53 (H) 8 - 28 mg/dL    Creatinine 2.19 (H) 0.60 - 1.10 mg/dL    GFR MDRD Af Amer 27 (L) >60 mL/min/1.73m2    GFR MDRD Non Af Amer 22 (L) >60 mL/min/1.73m2   Basic Metabolic Panel   Result Value Ref Range    Sodium 133 (L) 136 - 145 mmol/L    Potassium 3.6 3.5 - 5.0 mmol/L    Chloride 103 98 - 107 mmol/L    CO2 17 (L) 22 - 31 mmol/L    Anion Gap, Calculation 13 5 - 18 mmol/L    Glucose 149 (H) 70 - 125 mg/dL    Calcium 9.1 8.5 - 10.5 mg/dL    BUN 63 (H) 8 - 28 mg/dL    Creatinine 2.57 (H) 0.60 - 1.10 mg/dL    GFR MDRD Af Amer 22 (L) >60 mL/min/1.73m2    GFR MDRD Non Af Amer 18 (L) >60 mL/min/1.73m2   Magnesium   Result Value Ref Range    Magnesium 2.2 1.8 - 2.6 mg/dL   HM1 (CBC with Diff)   Result Value Ref Range    WBC 7.2 4.0 - 11.0 thou/uL    RBC 5.64 (H) 3.80 - 5.40 mill/uL    Hemoglobin 15.9 12.0 - 16.0 g/dL    Hematocrit 47.3 (H) 35.0 - 47.0 %    MCV 84 80 - 100 fL    MCH 28.2 27.0 - 34.0 pg    MCHC 33.6 32.0 - 36.0 g/dL    RDW 13.2 11.0 - 14.5 %    Platelets 333 140 - 440 thou/uL    MPV 9.0 8.5 - 12.5 fL    Neutrophils % 77 (H) 50 - 70 %    Lymphocytes % 16 (L) 20 - 40 %    Monocytes % 6 2 - 10 %    Eosinophils % 0 0 - 6 %    Basophils % 0 0 - 2 %    Neutrophils Absolute 5.5 2.0 - 7.7 thou/uL    Lymphocytes Absolute 1.2 0.8 - 4.4 thou/uL    Monocytes Absolute 0.4 0.0 - 0.9 thou/uL    Eosinophils Absolute 0.0 0.0 - 0.4 thou/uL    Basophils Absolute 0.0 0.0 - 0.2 thou/uL   Urinalysis-UC if  Indicated for patients > 12 years   Result Value Ref Range    Color, UA Yellow Colorless, Yellow, Straw, Light Yellow    Clarity, UA Cloudy (!) Clear    Glucose, UA Negative Negative    Bilirubin, UA Negative Negative    Ketones, UA Negative Negative, 60 mg/dL    Specific Gravity, UA 1.016 1.001 - 1.030    Blood, UA Negative Negative    pH, UA 5.0 4.5 - 8.0    Protein, UA 30 mg/dL (!) Negative mg/dL    Urobilinogen, UA <2.0 E.U./dL <2.0 E.U./dL, 2.0 E.U./dL    Nitrite, UA Negative Negative    Leukocytes, UA Large (!) Negative    Bacteria, UA None Seen None Seen hpf    RBC, UA 0-2 None Seen, 0-2 hpf    WBC, UA >100 (!) None Seen, 0-5 hpf    Squam Epithel, UA 10-25 (!) None Seen, 0-5 lpf    WBC Clumps Present (!) None Seen    Mucus, UA Few (!) None Seen lpf    Hyaline Casts, UA  (!) 0-5, None Seen lpf   Culture, Urine    Specimen: Urine   Result Value Ref Range    Culture No Growth    C. difficile Toxigenic by PCR    Specimen: Stool   Result Value Ref Range    C.Difficile Toxigenic by PCR Negative Negative    Ribotype 027/NAP1/B1 Presumptive Negative Presumptive Negative   Magnesium   Result Value Ref Range    Magnesium 2.0 1.8 - 2.6 mg/dL   Potassium   Result Value Ref Range    Potassium 3.6 3.5 - 5.0 mmol/L   Culture, Stool    Specimen: Stool   Result Value Ref Range    Culture       No Salmonella, Shigella, Yersinia, or Campylobacter.   EnteroHaemorrhagic E. coli Work Up    Specimen: Stool   Result Value Ref Range    Shiga Toxin 1 Negative Negative    Shiga Toxin 2 Negative Negative   Ova and parasite, stool    Specimen: Stool   Result Value Ref Range    Ova + Parasite Exam No ova or parasites seen No Ova or Parasites seen   Cryptosporidium/Giardia Combo, Stool    Specimen: Stool   Result Value Ref Range    Cryptosporidium Antigen No Cryptosporidium detected No Cryptosporidium detected    Giardia Antigen No Giardia cysts detected No Giardia cysts detected   Basic Metabolic Panel   Result Value Ref Range     Sodium 137 136 - 145 mmol/L    Potassium 4.4 3.5 - 5.0 mmol/L    Chloride 111 (H) 98 - 107 mmol/L    CO2 18 (L) 22 - 31 mmol/L    Anion Gap, Calculation 8 5 - 18 mmol/L    Glucose 94 70 - 125 mg/dL    Calcium 8.8 8.5 - 10.5 mg/dL    BUN 37 (H) 8 - 28 mg/dL    Creatinine 1.05 0.60 - 1.10 mg/dL    GFR MDRD Af Amer >60 >60 mL/min/1.73m2    GFR MDRD Non Af Amer 52 (L) >60 mL/min/1.73m2   Magnesium   Result Value Ref Range    Magnesium 2.2 1.8 - 2.6 mg/dL   HM2(CBC w/o Differential)   Result Value Ref Range    WBC 6.5 4.0 - 11.0 thou/uL    RBC 5.22 3.80 - 5.40 mill/uL    Hemoglobin 14.9 12.0 - 16.0 g/dL    Hematocrit 45.2 35.0 - 47.0 %    MCV 87 80 - 100 fL    MCH 28.5 27.0 - 34.0 pg    MCHC 33.0 32.0 - 36.0 g/dL    RDW 13.3 11.0 - 14.5 %    Platelets 284 140 - 440 thou/uL    MPV 9.0 8.5 - 12.5 fL   Urine culture    Specimen: Urine, Clean Catch   Result Value Ref Range    Culture Mixture of urogenital organisms    Potassium - Next AM   Result Value Ref Range    Potassium 4.4 3.5 - 5.0 mmol/L   Magnesium   Result Value Ref Range    Magnesium 2.0 1.8 - 2.6 mg/dL   Magnesium   Result Value Ref Range    Magnesium 1.9 1.8 - 2.6 mg/dL   Potassium - Next AM   Result Value Ref Range    Potassium 4.3 3.5 - 5.0 mmol/L   Creatinine   Result Value Ref Range    Creatinine 0.83 0.60 - 1.10 mg/dL    GFR MDRD Af Amer >60 >60 mL/min/1.73m2    GFR MDRD Non Af Amer >60 >60 mL/min/1.73m2          This note has been dictated using voice recognition software. Any grammatical or context distortions are unintentional and inherent to the software

## 2021-06-08 NOTE — TELEPHONE ENCOUNTER
RN triage call from patient  She reports that she started to have dizziness yesterday  She reports feeling wobbly as well  Denies falling  States looking down causes the dizziness  She has to look down frequently to take care of her ileostomy bag and this worsens the sensation  Denies a room spinning sensation  No chest pain, no trouble breathing, no one sided weakness or numbness  Reporting a tingling sensation in her right finger  She states she looks pale  She states she is a poor eater but drinks well  She states no significant change in her ileostomy bag contents  Does not have blood pressure issues or is diabetic  States history of irregular heart beats but does not feel a change in that currenty  No head pain no vision changes  Gave disposition for office visit now  Patient was agreeable  Reviewed home cares and signs and symptoms of when to call back  Patient states she is able to drive herself  Warm transferred to scheduling  Appt made today at 2 pm  Charla Aguilar, RN  [facility] Triage Nurse  11:30 AM  5/28/2020                  Reason for Disposition    Lightheadedness (dizziness) present now, after 2 hours of rest and fluids    Protocols used: DIZZINESS-A-OH

## 2021-06-08 NOTE — PROGRESS NOTES
Assessment/Plan:      Visit for Preoperative Exam.     Patient approved for surgery with general or local anesthesia. Postoperative Care will be managed by Hospital Service. Labs will be done as indicated.     Subjective:     History of Present Illness    Lauren is a 66-year-old woman who presents to the [facility] today for preop physical.  This unfortunate woman was diagnosed with ulcerative colitis in October of this year.  She had been having diarrhea for several months prior to her diagnosis.  She initially was having such severe symptoms, that she was hospitalized.  She has received 3 doses of Remicade.  That was discontinued as it was not providing any relief and she developed a secondary rash.  She also has received prednisone in the past.  Unfortunately, she continues to struggle and is not doing well currently.  She has lost an additional 10 pounds in the last 1 month (40 pound weight loss total).  She is not eating well and has developed some mild dehydration.  She has decreased energy and is feeling very weak.  She is otherwise very healthy from a medical standpoint.  She has no other major medical problems and does not take any other prescription medications.  She has no known coronary artery disease and denies chest pain or shortness of breath.  She has had no problems with anesthesia in the past.  She is up-to-date on immunizations.  CMP and CBC sent today.  I did call Dr. Christensen to let them know that she has not been doing very well.  They will try to push up the surgery.      Scheduled Procedure: proctocolectomy  Surgery Date:  2/27/2017  Surgery Location:  [facility]  Surgeon:  Dr. Navas    Current Outpatient Prescriptions   Medication Sig Dispense Refill     calcium-vitamin D 500 mg(1,250mg) -200 unit per tablet Take 1 tablet by mouth 2 (two) times a day.  0     cyanocobalamin, vitamin B-12, (VITAMIN B-12) 500 mcg TbER Take by mouth.       multivitamin with minerals (THERA-M) 9 mg iron-400  mcg Tab tablet Take 1 tablet by mouth daily.       No current facility-administered medications for this visit.        Allergies   Allergen Reactions     Penicillins Hives and Swelling     Metronidazole Rash       Immunization History   Administered Date(s) Administered     Influenza high dose, seasonal 10/27/2016     Pneumo Conj 13-V (2010&after) 06/01/2015     Pneumo Polysac 23-V 09/12/2016     Td, historic 03/18/1999     Tdap 07/31/2006, 02/11/2014       Patient Active Problem List   Diagnosis     Eye Pain     Fatigue     Osteopenia     Ulcerative colitis     Hyperglycemia     Ulcerative (chronic) enterocolitis       Past Medical History:   Diagnosis Date     Gallstones      Ulcerative (chronic) enterocolitis        Social History     Social History     Marital status:      Spouse name: N/A     Number of children: N/A     Years of education: N/A     Occupational History     Not on file.     Social History Main Topics     Smoking status: Never Smoker     Smokeless tobacco: Not on file     Alcohol use No     Drug use: No     Sexual activity: Not on file     Other Topics Concern     Not on file     Social History Narrative       Past Surgical History:   Procedure Laterality Date     AUGMENTATION MAMMAPLASTY  1979     TONSILLECTOMY         Review of Systems  A 12 point comprehensive review of systems was negative except as noted.    Fever: no  Chills: no  Fatigue: no  Chest Pain: no  Cough: no  Dyspnea: no  Urinary Frequency: no  Nausea: no  Vomiting: no  Diarrhea: no  Abdominal Pain: no  Easy Bruising: no  Lower Extremity Swelling: no  Poor Exercise Tolerance: no      Pertinent History  Prior Anesthesia: yes  Previous Anesthesia Reaction:  no  Diabetes: no  Cardiovascular Disease: no  Pulmonary Disease: no  Renal Disease: no  GI Disease: yes  Sleep Apnea: no  Thromboembolic Problems: no  Clotting Disorder: no  Bleeding Disorder: no  Transfusion Reaction: no  Impaired Immunity: yes  Steroid use in the last 6  "months: yes  Frequent Aspirin use: no    Family history of Noncontributory    Social history of   Social History   Substance Use Topics     Smoking status: Never Smoker     Smokeless tobacco: None     Alcohol use No       After surgery, the patient plans to recover at home with family.        Objective:         Vitals:    02/07/17 1437   BP: 92/50   Pulse: (!) 104   Resp: 12   Temp: 98  F (36.7  C)   TempSrc: Oral   Weight: 109 lb (49.4 kg)   Height: 5' 1\" (1.549 m)       Physical Exam:  Physical Exam:  General Appearance: Alert, cooperative, very thin  Head: Normocephalic, without obvious abnormality, atraumatic  Eyes: PERRL, conjunctiva/corneas clear, EOM's intact  Ears: Normal TM's and external ear canals, both ears  Nose: Nares normal, septum midline,mucosa normal, no drainage  Throat: Lips, mucosa, and tongue normal; teeth and gums normal  Neck: Supple, symmetrical, trachea midline, no adenopathy;  thyroid: not enlarged, symmetric, no tenderness/mass/nodules; no carotid bruit or JVD  Back: Symmetric, no curvature, ROM normal, no CVA tenderness  Lungs: Clear to auscultation bilaterally, respirations unlabored  Heart: Regular rate and rhythm, S1 and S2 normal, no murmur, rub, or gallop,   Abdomen: Soft, minimal diffuse tenderness, bowel sounds active all four quadrants,  no masses, no organomegaly  Extremities: Extremities normal, atraumatic, no cyanosis or edema  Skin: Skin color, texture, turgor normal, no rashes or lesions  Lymph nodes: Cervical, supraclavicular, and axillary nodes normal  Neurologic: Normal        Results for orders placed or performed in visit on 02/07/17   Comprehensive Metabolic Panel   Result Value Ref Range    Sodium 135 (L) 136 - 145 mmol/L    Potassium 4.1 3.5 - 5.0 mmol/L    Chloride 99 98 - 107 mmol/L    CO2 26 22 - 31 mmol/L    Anion Gap, Calculation 10 5 - 18 mmol/L    Glucose 108 70 - 125 mg/dL    BUN 17 8 - 22 mg/dL    Creatinine 0.77 0.60 - 1.10 mg/dL    GFR MDRD Af Amer >60 >60 " mL/min/1.73m2    GFR MDRD Non Af Amer >60 >60 mL/min/1.73m2    Bilirubin, Total 0.4 0.0 - 1.0 mg/dL    Calcium 8.8 8.5 - 10.5 mg/dL    Protein, Total 6.5 6.0 - 8.0 g/dL    Albumin 2.1 (L) 3.5 - 5.0 g/dL    Alkaline Phosphatase 76 45 - 120 U/L    AST 13 0 - 40 U/L    ALT 11 0 - 45 U/L   HM2(CBC w/o Differential)   Result Value Ref Range    WBC 4.2 4.0 - 11.0 thou/uL    RBC 3.54 (L) 3.80 - 5.40 mill/uL    Hemoglobin 9.4 (L) 12.0 - 16.0 g/dL    Hematocrit 28.3 (L) 35.0 - 47.0 %    MCV 80 80 - 100 fL    MCH 26.6 (L) 27.0 - 34.0 pg    MCHC 33.2 32.0 - 36.0 g/dL    RDW 12.6 11.0 - 14.5 %    Platelets 464 (H) 140 - 440 thou/uL    MPV 6.1 (L) 7.0 - 10.0 fL       Giovanna Parrish MD

## 2021-06-08 NOTE — ANESTHESIA POSTPROCEDURE EVALUATION
Patient: Lauren Dhaliwal  LAPAROSCOPIC ASSISTED TOTAL PROCTOCOLECTOMY WITH ILEOSTOMY, AND PROCYOSCOPY  Anesthesia type: general    Patient location: PACU  Last vitals:   Vitals:    02/13/17 2220   BP: 102/56   Pulse:    Resp:    Temp:    SpO2:      Post vital signs: stable  Level of consciousness: awake and responds to simple questions  Post-anesthesia pain: pain controlled  Post-anesthesia nausea and vomiting: no  Pulmonary: unassisted, return to baseline  Cardiovascular: stable and blood pressure at baseline  Hydration: adequate  Anesthetic events: no    QCDR Measures:  ASA# 11 - Gaby-op Cardiac Arrest: ASA11B - Patient did NOT experience unanticipated cardiac arrest  ASA# 12 - Gaby-op Mortality Rate: ASA12B - Patient did NOT die  ASA# 13 - PACU Re-Intubation Rate: ASA13B - Patient did NOT require a new airway mgmt  ASA# 10 - Composite Anes Safety: ASA10A - No serious adverse event  ASA# 38 - New Corneal Injury: ASA38A - No new exposure keratitis or corneal abrasion in PACU    Additional Notes:

## 2021-06-08 NOTE — ANESTHESIA PREPROCEDURE EVALUATION
Anesthesia Evaluation      Patient summary reviewed     Airway   Mallampati: II  Neck ROM: full   Pulmonary - negative ROS    breath sounds clear to auscultation                         Cardiovascular - negative ROS  Rate: normal,         Neuro/Psych - negative ROS     Endo/Other - negative ROS      GI/Hepatic/Renal - negative ROS      Other findings: Severe ulcerative colitis with weight loss, malnutrition and [id number]      Dental - normal exam                        Anesthesia Plan  Planned anesthetic: general endotracheal    ASA 3   Induction: intravenous   Anesthetic plan and risks discussed with: patient    Post-op plan: routine recovery

## 2021-06-08 NOTE — PROGRESS NOTES
"  ASSESSMENT/ PLAN      Lauren was seen today for dizziness.    Dizziness  -     Ambulatory referral to PT/OT  -     Comprehensive Metabolic Panel  -     HM1(CBC and Differential)  -     HM1 (CBC with Diff)    Lightheadedness  -     Ambulatory referral to PT/OT  -     Comprehensive Metabolic Panel  -     HM1(CBC and Differential)  -     HM1 (CBC with Diff)    Benign paroxysmal positional vertigo due to bilateral vestibular disorder  -     Ambulatory referral to PT/OT  -     Comprehensive Metabolic Panel  -     HM1(CBC and Differential)  -     HM1 (CBC with Diff)    Symptoms seem consistent with BPPV. Will check lab to rule out other causes. She's worried about brain tumor and I reassured patient that based on her symptoms I don't think that's the case at this point in time. Can certainly pursue brain imaging in the future if needed. I advised her to get vestibular therapy, referral placed. I don't think meclizine is a good option for her so I didn't mention. She hasn't fallen so safe to go home and do vestibular therapy. No acute neurologic symptom today.       This note was created using Dragon dictation software, spelling errors may occur.     Rowena Thompson MD        SUBJECTIVE   Lauren Dhaliwal is a 69 y.o. old female who presented to clinic today for further evaluation of dizziness. Started yesterday. Whenever she gets up or bends over or turning her head or does any change in her position she feels lightheaded and \"spinning.\" she has not passed out. Symptoms have not worsened but have not improved either. Denies fever/chills, ear pain, throat pain, sinus pain, ew cough, shortness of breath or chest pain or abdominal pain, increased osteomy output or change in stool color, urinary symptoms or bowel symptoms. She has an ileostomy and was on Remicade before which makes her cough chronically. No neurological symptoms either. Lives with her  at home.       Review of Systems:  Negative except as noted in " "HPI      The following portions of the patient's history were reviewed and updated as appropriate: past medical history, past surgical history, family history, allergies, current medications and problem list.    Medical History  Active Ambulatory (Non-Hospital) Problems    Diagnosis     Bronchiectasis (H)     Normocytic anemia     Ulcerative colitis with complication, unspecified location (H) - s/p colectomy     Other osteoporosis without current pathological fracture     Past Medical History:   Diagnosis Date     Gallstones      Ulcerative (chronic) enterocolitis (H)        Surgical History  She  has a past surgical history that includes Tonsillectomy; Augmentation mammaplasty (1979); PICC (2/13/2017); Abdominoperineal proctocolectomy (N/A, 2/13/2017); and [id number].    Social History  Reviewed, and she  reports that she has never smoked. She has never used smokeless tobacco. She reports that she does not drink alcohol or use drugs.    Family History  Reviewed, and family history includes Colon cancer in her mother.    Medications  Reviewed and reconciled    Allergies  Allergies   Allergen Reactions     Penicillins Hives and Swelling     Albuterol Palpitations     Clindamycin Rash     Heparin Itching and Rash     Lovenox [Enoxaparin] Itching and Rash     Metronidazole Rash     Remicade [Infliximab] Itching and Rash         OBJECTIVE  Physical Exam:  Vital signs: /78 (Patient Site: Left Arm, Patient Position: Sitting, Cuff Size: Adult Regular)   Pulse 76   Ht 5' 1\" (1.549 m)   Wt 130 lb (59 kg)   SpO2 97%   BMI 24.56 kg/m    Weight: 130 lb (59 kg)    General appearance: pleasant, appears stated age, cooperative and in no distress  Eyes: EOMs intact, PERRL, conjunctivae normal.   ENT: moist oral mucosa, posterior oropharynx normal, TMs normal. No sinus tenderness. Thyroid normal to palpation, no lump or mass or tenderness, no carotid bruit or JVD appreciated.   Lymph: no cervical/supraclavicular " adenopathy  Respiratory: clear to auscultation bilaterally, good air movement throughout, no wheezing or crackles, speaking full sentences without difficulty  Cardiovascular: regular rate and rhythm, no murmur appreciated, no leg edema  Abdomen: active bowel sounds, soft, non-tender, non-distended, ostomy looks fine.   Musculoskeletal: warm and well perfused, strong and symmetric dorsalis pedal pulses, strength 5/5 and equal bilaterally  Skin: no rashes  Neuro: alert oriented x 3, grossly normal otherwise, CN2-12 intact, [place] hallpike positive bilaterally.   Psych: normal affect, appropriate conversation

## 2021-06-08 NOTE — ANESTHESIA CARE TRANSFER NOTE
Last vitals:   Vitals:    02/13/17 1847   BP: 109/56   Pulse: 77   Resp: 14   Temp: 37.2  C (99  F)   SpO2: 100%     Patient's level of consciousness is drowsy  Spontaneous respirations: yes  Maintains airway independently: yes  Dentition unchanged: yes  Oropharynx: oropharynx clear of all foreign objects    QCDR Measures:  ASA# 20 - Surgical Safety Checklist: ASA20A - Safety Checks Done  PQRS# 430 - Adult PONV Prevention: 4558F - Pt received => 2 anti-emetic agents (different classes) preop & intraop  ASA# 8 - Peds PONV Prevention: NA - Not pediatric patient, not GA or 2 or more risk factors NOT present  PQRS# 424 - Gaby-op Temp Management: 4559F - At least one body temp DOCUMENTED => 35.5C or 95.9F within required timeframe  PQRS# 426 - PACU Transfer Protocol: - Transfer of care checklist used  ASA# 14 - Acute Post-op Pain: ASA14B - Patient did NOT experience pain >= 7 out of 10    I completed my SBAR handoff to the receiving nurse per policy and procedure.

## 2021-06-08 NOTE — PROGRESS NOTES
[facility] Daily Progress     Patient Name: Lauren Dhaliwal  Date: 2020  Visit #:  through 2020  PTA visit #:    PRECAUTIONS: osteoporosis  Referral Diagnosis:       R42 (ICD-10-CM) - Dizziness    R42 (ICD-10-CM) - Lightheadedness    H81.13 (ICD-10-CM) - Benign paroxysmal positional vertigo due to bilateral vestibular disorder       Referring provider: Rowena Thompson MD  Visit Diagnosis:     ICD-10-CM    1. BPPV (benign paroxysmal positional vertigo), right  H81.11          Assessment:     Pt's BPPV has resolved. No further PT needed.    Goal Status: MET  Pt. will bend: to dress;to clean;Comment  Comment:: without dizziness in 4 weeks.  Patient will look up / down: without vertigo;without dizziness;for drinking;for reading;in 4 weeks      Plan / Patient Education:     Discharge from PT at this time.    Subjective:     Pain Ratin   Reports dizziness/vertigo is gone. Has not had any since last visit. Reports she did get a little light-headed the other day when she brought her head up after being bent over for an extended period of time cleaning her . She relates this to her BP, as she was not dizzy bent over (like she used to be), and knows she often runs on the lower side and this is common for her.    Objective:     Negative L and R [place]-Hallpike. Negative L and R horizontal roll tests.  Reviewed in-depth relevant vestibular anatomy and steps to take in the event of reoccurrence. Educated that continued, everyday mvmt is key and essential to proper vestibular functioning, and to not avoid particular mvmts that used to make her dizzy.    Treatment Today     TREATMENT MINUTES COMMENTS   Evaluation     Self-care/ Home management 15 Per above   Manual therapy     Neuromuscular Re-education     Therapeutic Activity     Therapeutic Exercises     Gait training     Modality__________________                Total 15    Blank areas are intentional and mean the treatment did not include  these items.       Magno Martínez  6/5/2020     [facility] Discharge Summary  Patient Name: Lauren Dhaliwal  Date: 6/5/2020  Referral Diagnosis: [unfilled]  Referring provider: Rowena Thompson MD  Visit Diagnosis:   1. BPPV (benign paroxysmal positional vertigo), right         Goals: MET  Pt. will bend: to dress;to clean;Comment  Comment:: without dizziness in 4 weeks.  Patient will look up / down: without vertigo;without dizziness;for drinking;for reading;in 4 weeks      Patient was seen for 2 visits from 6/2/20 to 6/5/20 with 0 missed appointments.  The patient met goals and has demonstrated understanding of and independence in the home program for self-care, and progression to next steps.  She will initiate contact if questions or concerns arise.    Therapy will be discontinued at this time.  The patient will need a new referral to resume.    Thank you for your referral.  Magno Martínez  6/5/2020  11:07 AM

## 2021-06-09 NOTE — PROGRESS NOTES
OUTPATIENT OSTOMY ASSESSMENT    Patients mode of arrival: Ambulatory    INTAKE  Type of Stoma: Permanent Ileostomy  Anticipated date of takedown: NA    Diagnosis Pertinent to Stoma:Ulcerative Colitis Date of Diagnosis: chronic  Type of Surgery: Ileostomy    Surgery Date: 2/13/17  Surgeon:Fermin Barbosa: [facility]    Purpose of this visit:Checkup:2 week follow up    Present for Teaching Session: Patient and Spouse   Present: NA    Pertinent Information: Patient presents for post-op follow up    Current Equipment:    Product # Brand Description   Pouch [address] CTF   Flange      Paste [address] [place] Barrier ring   Powder      Deodorizer [id number] [place] Lubricating deodorant   Skin barrier unknown Medline No sting skin prep           Pouch Change Frequency: every 3 days  Provider of Care: Emptying: self Pouch Change: Home RN and patient    ASSESSMENT  Stoma Size: Oval 1-1/4x1-3/8 inches  Protrusion: 1.5cm  Vascularity: Pink  Mucocutaneous Juncture: Intact  Peristomal Skin: Abnormal partial thickness denudement circumferentially up to 2cm    Wound: No  Current Wound Care: NA    TREATMENT  Applied: Stoma powder and Cavilon no-sting    INTERVENTIONS  Refitting done and Educated on peristomal skin treatment  Miscellaneous Interventions: Signed up for [employer] or [employer] Start    INSTRUCTIONS GIVEN  WOC Role, Anatomy, Bathing: Ostomy supplies are waterproof., Clothing, Diet, Fluids: Drink 6-8 glasses of fluids daily , Pouching Products: Showed pouching system , Insurance and Ordering supplies    PLAN  Change in Supplies: See Outpatient Ostomy Instructions      Total Time Spent with Patient: 45 minutes.  Education time: 25 minutes.  Wound care: 0 minutes.

## 2021-06-09 NOTE — PROGRESS NOTES
Assessment/ Plan     1. Hospital discharge follow-up  Patient was hospitalized at [facility] from February 12 -February 20th for severe ulcerative colitis.  She had a total colectomy performed and did well postoperatively.  They are planning to go back again in several months, had to limit the surgery due to her malnutrition.  She is learning how to manage her colostomy bag.  Her incision looks well without any signs of infection.  She is due to see her surgeon next week.    2. Ulcerative colitis with other complication, unspecified location  She did have some elevated blood sugars as well as low potassium and magnesium in the hospital, will recheck those today.  - Comprehensive Metabolic Panel  - HM2(CBC w/o Differential)  - Magnesium      Subjective:       Lauren Dhaliwal is a 66 y.o. female who presents for follow-up.  Patient has been dealing with severe diarrhea since last June.  She was eventually diagnosed with ulcerative colitis which has been quite severe.  She failed both Remicade and prednisone.  She lost at least 40 pounds and developed severe malnutrition.  They eventually decided to proceed with a colectomy.  Her surgery was actually pushed up because when I saw her for her preop, she was quite weak and malnourished.  They were able to get her in sooner.  They gave her fluids and TPN while she was in the hospital.  She did remarkably well from the surgery and is actually feeling much better.  She is working with home health to manage her colostomy.  She states that she is adjusting well.  She is anxious to get out and start doing things, but is still quite fatigued.  We discussed it is probably not take her quite a while to get some of her energy back.  She does have an appetite and is starting to gain some weight.  She has had no problem with her incision.  She did have some mild anemia and low potassium and magnesium in the hospital that they had to replace.  She also had some elevated abnormal blood  "sugars that we will follow-up on.    The following portions of the patient's history were reviewed and updated as appropriate: allergies, current medications, past family history, past medical history, past social history, past surgical history and problem list.    Review of Systems   A 12 point comprehensive review of systems was negative except as noted.      Current Outpatient Prescriptions   Medication Sig Dispense Refill     acetaminophen (TYLENOL) 500 MG tablet Take 1 tablet (500 mg total) by mouth every 4 (four) hours as needed. 30 tablet 0     CALCIUM CARBONATE (CALCIUM 500 ORAL) Take 1,000 mg by mouth daily.       cholecalciferol, vitamin D3, 1,000 unit tablet Take 1,000 Units by mouth daily.       cyanocobalamin, vitamin B-12, (VITAMIN B-12) 500 mcg TbER Take 500 mg by mouth daily.        enoxaparin (LOVENOX) 40 mg/0.4 mL syringe Inject 0.4 mL (40 mg total) under the skin daily. 28 Syringe 0     multivitamin with minerals (THERA-M) 9 mg iron-400 mcg Tab tablet Take 1 tablet by mouth daily.       nystatin (MYCOSTATIN) cream Apply from ankles to toes BID 30 g 0     UNABLE TO FIND Take 1 capsule by mouth daily. Med Name: Mineral blend for colon health, OTC online, pt unsure of ingredients       VITAMIN A ORAL Take 1 capsule by mouth daily. Unknown OTC strength       omeprazole (PRILOSEC) 20 MG capsule Take 1 capsule (20 mg total) by mouth Daily before breakfast. 30 capsule 0     oxyCODONE (ROXICODONE) 5 MG immediate release tablet Take 1-2 tablets (5-10 mg total) by mouth every 4 (four) hours as needed. 30 tablet 0     No current facility-administered medications for this visit.        Objective:        Visit Vitals     /60 (Patient Site: Left Arm, Patient Position: Sitting, Cuff Size: Adult Regular)     Pulse 98     Temp 97.7  F (36.5  C) (Oral)     Resp 16     Ht 5' 1.5\" (1.562 m)     Wt 118 lb 12 oz (53.9 kg)     BMI 22.07 kg/m2         General appearance: alert, appears stated age and " cooperative  Lungs: clear to auscultation bilaterally  Heart: regular rate and rhythm, S1, S2 normal, no murmur, click, rub or gallop  Abdomen: soft, non-tender; bowel sounds normal; no masses,  no organomegaly, colostomy bag, surgical incision is healing well, no surrounding erythema.  Extremities: Trace edema      Recent Results (from the past 168 hour(s))   HM2(CBC w/o Differential)   Result Value Ref Range    WBC 5.2 4.0 - 11.0 thou/uL    RBC 3.77 (L) 3.80 - 5.40 mill/uL    Hemoglobin 10.4 (L) 12.0 - 16.0 g/dL    Hematocrit 31.8 (L) 35.0 - 47.0 %    MCV 84 80 - 100 fL    MCH 27.6 27.0 - 34.0 pg    MCHC 32.7 32.0 - 36.0 g/dL    RDW 14.9 (H) 11.0 - 14.5 %    Platelets 298 140 - 440 thou/uL    MPV 6.1 (L) 7.0 - 10.0 fL          This note has been dictated using voice recognition software. Any grammatical or context distortions are unintentional and inherent to the software

## 2021-06-09 NOTE — TELEPHONE ENCOUNTER
Rx queued for MD approval.  Last OV 5/28/20 with Dr. Thompson.  Last Ov with Dr. Parrish on 3/10/20.

## 2021-06-10 NOTE — PROGRESS NOTES
Subjective:      Patient ID: Lauren Dhaliwal is a 66 y.o. female.    Chief Complaint:    HPI Lauren Dhaliwal is a 66 y.o. female who presents today complaining of deer tick bite located on the left shoulder, the patient is unsure if she got it all out when she removed the tick.  She is also unaware of how long the tick was there before she noticed it.  She denies any sick symptoms such as fever, muscle or joint aches, rashes, or abdominal complaints.         Past Medical History:   Diagnosis Date     Gallstones      Ulcerative (chronic) enterocolitis        Past Surgical History:   Procedure Laterality Date     ABDOMINOPERINEAL PROCTOCOLECTOMY N/A 2/13/2017    Procedure: LAPAROSCOPIC ASSISTED TOTAL PROCTOCOLECTOMY WITH ILEOSTOMY, AND PROCYOSCOPY;  Surgeon: Krishna Christensen [place] [facility] [place]:      AUG[facility] [facility]MAPLASTY  [facility]     [facility]  2/13/2017          TONSILLECTOMY         Family History   Problem Relation Age of Onset     Colon cancer Mother      No Medical Problems Father      No Medical Problems Sister      No Medical Problems Daughter      No Medical Problems Maternal Grandmother      No Medical Problems Maternal Grandfather      No Medical Problems Paternal Grandmother      No Medical Problems Paternal Grandfather      No Medical Problems Maternal Aunt      No Medical Problems Paternal Aunt      BRCA 1/2 Neg Hx      Breast cancer Neg Hx      Cancer Neg Hx      Endometrial cancer Neg Hx      Ovarian cancer Neg Hx        Social History   Substance Use Topics     Smoking status: Never Smoker     Smokeless tobacco: Never Used     Alcohol use No       Review of Systems   Constitutional: Negative for fatigue and fever.   Gastrointestinal: Negative.    Musculoskeletal: Negative for arthralgias, gait problem and myalgias.   Skin: Negative for rash.       Objective:     /70  Pulse 79  Temp 97.9  F (36.6  C) (Oral)   Resp 16  Wt 128 lb 12.8 oz (58.4 kg)  SpO2 98%  BMI 23.94  kg/m2    Physical Exam   Constitutional: She appears well-developed and well-nourished. No distress.   HENT:   Head: Normocephalic and atraumatic.   Eyes: Conjunctivae are normal.   Neck: Normal range of motion. Neck supple.   Pulmonary/Chest: Effort normal.   Skin: Skin is warm. No rash noted. She is not diaphoretic. No erythema. No pallor.   Patient has a small tick bite located on the anterior aspect of her left shoulder. The head appears to be present.      Clinical Decision Making:  Considering the patient is not sure how long the tick was attached, and the head is still present I decided to treat her with Doxycycline x 14 days. I did not test for Lyme because it would not be accurate or change my plan of action. I attempted to remove the head of the tick, but was unable to get it all out. Literature states that it is safe for the head to remain in the skin, because the skin will likely work the foreign body to the surface.     Procedures      Assessment / Plan:     1. Tick bite  doxycycline (MONODOX) 100 MG capsule         Patient Instructions   1. Take Doxycycline twice daily for a total of 14 days.   2. This medication makes skin more sensitive to sunlight. Be more aware and cautious with sun exposure.  3. Avoid having dairy within 1-2 hours of taking the medication.  4. Follow up if you develop symptoms or Lyme after the medication is completed. Symptoms include confusion, muscle and joint pain, bullseye like rash.

## 2021-06-10 NOTE — TELEPHONE ENCOUNTER
Orders being requested: Ostomy supplies. States needs a written rx.  Reason service is needed/diagnosis:   S/P colectomy  When are orders needed by: asap  Where to send Orders: Fax: On request letter that is being faxed to clinic today.  Okay to leave detailed message?  Yes

## 2021-06-10 NOTE — TELEPHONE ENCOUNTER
Spoke to Lorena at [facility], requested form be refaxed to [phone] as we haven't receive previous.

## 2021-06-11 NOTE — PROGRESS NOTES
OUTPATIENT OSTOMY ASSESSMENT    Patients mode of arrival: Ambulatory    INTAKE  Type of Stoma: Permanent Ileostomy  Anticipated date of takedown: NA    Diagnosis Pertinent to Stoma:Ulcerative Colitis Date of Diagnosis: Fall 2016  Type of Surgery: Ileostomy    Surgery Date: 2/13/17  Surgeon:Fermin     [facility]: [facility]    Purpose of this visit:Leaking Problem    Present for Teaching Session: Patient and Spouse   Present: NA    Pertinent Information: Having leaking sometimes requiring daily changes, was having no leaking issues with a convex pouch but went back to a flat pouch because she doesn't like how far the pouch sticks out    Current Equipment:    Product # Brand Description   Pouch [address] CTF   Flange      Paste [address] [employer] ring   Powder      Deodorizer                    Pouch Change Frequency: Up to daily  Provider of Care: Emptying: self Pouch Change: self    ASSESSMENT  Stoma Size: Round 1 1/16 inches  Protrusion: 1cm  Vascularity: Pink  Mucocutaneous Juncture: Intact  Peristomal Skin: Abnormal Scattered partial thickness denudement extending out up to 3cm from 5-8 o'clock    Wound: No  Current Wound Care: NA    TREATMENT  Applied: powder to denudement    INTERVENTIONS  Refitting done, Educated on peristomal skin treatment and Educated on pouch change procedure  Miscellaneous Interventions: Signed up for [employer] or Secure Start    INSTRUCTIONS GIVEN  WOC Role, Activity, Anatomy, Fluids: Drink 6-8 glasses of fluids daily , Insurance and Ordering supplies    PLAN  Change in Supplies: See Outpatient Ostomy Instructions and New Pouching Procedure: See Outpatient Ostomy Instructions      Total Time Spent with Patient: 45 minutes.  Education time: 20 minutes.

## 2021-06-11 NOTE — TELEPHONE ENCOUNTER
Who is calling:  Lorena   Reason for Call:  Caller stated that on the order it didn't mention how many refills is needed if Giovanna Parrish MD nurse can resend that. Also there was another part to the order that the doctor had order and they had questions on it as well. Caller stated that the doctor should know what it is due to the ordering it.   Date of last appointment with primary care:   Okay to leave a detailed message: Yes  [phone]

## 2021-06-14 NOTE — PROGRESS NOTES
Assessment/ Plan     1. Mastitis of right breast unrelated to pregnancy of breastfeeding  Patient appears to have a superficial cellulitis over the right breast.  She is not systemically ill, no fever or chills.  Will treat her with cephalexin for 10 days and have her follow-up if this is not improving.  It does not appear that the implant itself is infected.  She will schedule her mammogram in January.  We may consider doing sooner if this does not resolve.  - cephalexin (KEFLEX) 500 MG capsule; Take 1 capsule (500 mg total) by mouth 3 (three) times a day for 10 days.  Dispense: 30 capsule; Refill: 0      Subjective:       Lauren Dhaliwal is a 66 y.o. female who presents for evaluation of right breast tenderness and redness.  Patient states that this started 2 days ago she just kind of noticed there is some redness and tenderness of the right breast.  It felt a little bit warm.  She has not had a fever, no chills, no malaise.  She otherwise feels like her normal self.  She cannot think of any trauma to the area.  She had a normal mammogram last January and plans on following up again next month.  She does have bilateral breast implants that are at least 30 years old.  We know for mammogram that the left has a stable rupture.    Relevant past medical, family, surgical, and social history reviewed with patient, unless noted in HPI, not pertinent for this visit.    Review of Systems   A 12 point comprehensive review of systems was negative except as noted.      Current Outpatient Prescriptions   Medication Sig Dispense Refill     CALCIUM CARBONATE (CALCIUM 500 ORAL) Take 1,000 mg by mouth daily.       cholecalciferol, vitamin D3, 1,000 unit tablet Take 1,000 Units by mouth daily.       multivitamin with minerals (THERA-M) 9 mg iron-400 mcg Tab tablet Take 1 tablet by mouth daily.       VITAMIN A ORAL Take 1 capsule by mouth daily. Unknown OTC strength       cephalexin (KEFLEX) 500 MG capsule Take 1 capsule (500 mg  "total) by mouth 3 (three) times a day for 10 days. 30 capsule 0     cyanocobalamin, vitamin B-12, (VITAMIN B-12) 500 mcg TbER Take 500 mg by mouth daily.        enoxaparin (LOVENOX) 40 mg/0.4 mL syringe Inject 0.4 mL (40 mg total) under the skin daily. 28 Syringe 0     nystatin (MYCOSTATIN) cream Apply from ankles to toes BID 30 g 0     omeprazole (PRILOSEC) 20 MG capsule Take 1 capsule (20 mg total) by mouth Daily before breakfast. 30 capsule 0     UNABLE TO FIND Take 1 capsule by mouth daily. Med Name: Mineral blend for colon health, OTC online, pt unsure of ingredients       No current facility-administered medications for this visit.        Objective:      /60  Pulse 64  Temp 98.1  F (36.7  C) (Oral)   Resp 16  Ht 5' 1.5\" (1.562 m)  Wt 128 lb (58.1 kg)  BMI 23.79 kg/m2      General appearance: alert, appears stated age and cooperative  Breast exam: Right breast has some erythema, induration, and warmth over the nipple and extending out.  The left is normal.  Implants are in place no tenderness around the implant otherwise.  Lungs: clear to auscultation bilaterally  Heart: regular rate and rhythm, S1, S2 normal, no murmur, click, rub or gallop    No results found for this or any previous visit (from the past 168 hour(s)).       This note has been dictated using voice recognition software. Any grammatical or context distortions are unintentional and inherent to the software  "

## 2021-06-15 PROBLEM — D64.9 NORMOCYTIC ANEMIA: Status: ACTIVE | Noted: 2017-02-12

## 2021-06-15 NOTE — PROGRESS NOTES
Assessment/ Plan     1. Chronic cough  Patient has been coughing for longer than 6 months.  Chest x-ray is normal today.  She has no signs of chronic postnasal drip or reflux.  No prior history of smoking or asthma.  Suspect possibly allergies.  Allergies testing was sent and will notify her when those come back.  In the meantime will try Claritin daily.  - XR Chest 2 Views  - IgE Allergen Panel Respiratory with Total IgE ($$$)  On chest x-ray, 2 compression fractures were noted.  Called and talked to patient recommend that she get a bone density as it has been quite some time.  She is willing to do that.    Subjective:       Lauren Dhaliwal is a 67 y.o. female who presents for evaluation of a cough.  Patient states this is been going on for about 6 or 7 months.  She has not really noticed a pattern to it as far as time of day.  He has not had any other ill symptoms.  Is not noticing any nasal congestion or postnasal drip.  Cough is occasionally productive.  She denies any symptoms of reflux.  She has no prior history of smoking or asthma.  She is not sure if maybe she has some allergies.  She has some cats in the home and admits that her house can be somewhat peter.  Her  was the one that finally wanted to come in as it is keeping him up at night.  She denies feeling short of breath and there is no chest pain.  She really has not tried anything over-the-counter.  Is not taking any prescription medications.  She is doing well from her ulcerative colitis and is status post colectomy with an ostomy.    Relevant past medical, family, surgical, and social history reviewed with patient, unless noted in HPI, not pertinent for this visit.    Review of Systems   A 12 point comprehensive review of systems was negative except as noted.      No current outpatient prescriptions on file.     No current facility-administered medications for this visit.        Objective:      /60  Pulse 68  Temp 98.2  F (36.8  C)  "(Oral)   Resp 20  Ht 5' 1.5\" (1.562 m)  Wt 126 lb 12 oz (57.5 kg)  SpO2 98%  BMI 23.56 kg/m2      General appearance: alert, appears stated age and cooperative  Head: Normocephalic, without obvious abnormality, atraumatic  Eyes: conjunctivae/corneas clear.   Ears: normal TM's and external ear canals both ears  Nose: Nares normal. Septum midline. Mucosa normal. No drainage or sinus tenderness.  Throat: lips, mucosa, and tongue normal; teeth and gums normal  Neck: no adenopathy  Lungs: clear to auscultation bilaterally  Heart: regular rate and rhythm, S1, S2 normal, no murmur, click, rub or gallop    Chest x-ray: Personally reviewed, somewhat difficult to read due to breast implants, but no obvious infiltrates.  Will await for official read.  Several compression fractures noted.    No results found for this or any previous visit (from the past 168 hour(s)).       This note has been dictated using voice recognition software. Any grammatical or context distortions are unintentional and inherent to the software  "

## 2021-06-16 ENCOUNTER — COMMUNICATION - HEALTHEAST (OUTPATIENT)
Dept: FAMILY MEDICINE | Facility: CLINIC | Age: 71
End: 2021-06-16

## 2021-06-16 DIAGNOSIS — M81.0 SENILE OSTEOPOROSIS: ICD-10-CM

## 2021-06-16 PROBLEM — J47.9 BRONCHIECTASIS (H): Status: ACTIVE | Noted: 2020-03-03

## 2021-06-16 NOTE — PROGRESS NOTES
Assessment and Plan:     1. Medicare annual wellness visit, subsequent  Lauren presents for her annual wellness exam.  As far as healthcare maintenance, she already had her mammogram this year.  She no longer needs colonoscopies as she has had a colectomy.  She is due for her bone density to follow-up on Fosamax start 2 years ago.  She is up-to-date on immunizations.  PHQ 2 equals 0  Mini cog equals 5 out of 5    2. Other osteoporosis without current pathological fracture  She started Fosamax approximately 2 years ago.  We are due to recheck her bone density.  - Vitamin D, Total (25-Hydroxy)    3. Ulcerative colitis with complication, unspecified location (H) - s/p colectomy  - Comprehensive Metabolic Panel  - HM2(CBC w/o Differential)    4. Normocytic anemia  She has a history of anemia.  We will check blood work today.    5. Bronchiectasis without complication (H)  She has been following with pulmonary and things have been stable.    6. Encounter for screening for lipoid disorders  - Lipid [place], FASTING; Future  - Lipid [place], FASTING    7. Postmenopausal  - DXA Bone Density Scan; Future     The patient's current medical problems were reviewed.      The following health maintenance schedule was reviewed with the patient and provided in printed form in the after visit summary:   Health Maintenance Due   Topic Date Due     HEPATITIS C SCREENING  Never done     COVID-19 Vaccine (1) Never done     ZOSTER VACCINES (1 of 2) Never done        Subjective:   Chief Complaint: Lauren Dhaliwal is an 70 y.o. female here for an Annual Wellness visit.   HPI: Overall, she has been doing fairly well during the pandemic.  She is really not having any concerns today.  She had a colectomy for ulcerative colitis and is not having any issues with this.  She started Fosamax 2 years ago for osteoporosis.  She is tolerating it well without any adverse side effects.    Review of Systems:    Please see above.  The rest of the review  of systems are negative for all systems.    Patient Care Team:  Giovanna Parrish MD as PCP - General (Family Medicine)  Giovanna Parrish MD as Assigned PCP     Patient Active Problem List   Diagnosis     Other osteoporosis without current pathological fracture     Ulcerative colitis with complication, unspecified location (H) - s/p colectomy     Normocytic anemia     Bronchiectasis (H)     Past Medical History:   Diagnosis Date     Gallstones      Ulcerative (chronic) enterocolitis (H)       Past Surgical History:   Procedure Laterality Date     ABDOMINOPERINEAL PROCTOCOLECTOMY N/A 2/13/2017    Procedure: LAPAROSCOPIC ASSISTED TOTAL PROCTOCOLECTOMY WITH ILEOSTOMY, AND PROCYOSCOPY;  Surgeon: Krishna Christensen MD;  Location: [facility];  Service:      AUGMENTATION MAMMAPLASTY  1979     [facility]  2/13/2017          [id number]      Colectomy with colostomy     TONSILLECTOMY        Family History   Problem Relation Age of Onset     Colon cancer Mother      Breast cancer Neg Hx       Social History     Socioeconomic History     Marital status:      Spouse name: Not on file     Number of children: Not on file     Years of education: Not on file     Highest education level: Not on file   Occupational History     Not on file   Social Needs     Financial resource strain: Not on file     Food insecurity     Worry: Not on file     Inability: Not on file     Transportation needs     Medical: Not on file     Non-medical: Not on file   Tobacco Use     Smoking status: Never Smoker     Smokeless tobacco: Never Used   Substance and Sexual Activity     Alcohol use: No     Drug use: No     Sexual activity: Not on file   Lifestyle     Physical activity     Days per week: Not on file     Minutes per session: Not on file     Stress: Not on file   Relationships     Social connections     Talks on phone: Not on file     Gets together: Not on file     Attends Voodoo service: Not on file     Active member of club or organization: Not  "on file     Attends meetings of clubs or organizations: Not on file     Relationship status: Not on file     Intimate partner violence     Fear of current or ex partner: Not on file     Emotionally abused: Not on file     Physically abused: Not on file     Forced sexual activity: Not on file   Other Topics Concern     Not on file   Social History Narrative     Not on file      Current Outpatient Medications   Medication Sig Dispense Refill     alendronate (FOSAMAX) 70 MG tablet Take 1 tablet (70 mg total) by mouth every 7 days. Take in the morning on an empty stomach with a full glass of water 30 minutes before food 12 tablet 3     vit C/E/zinc ox/marely/lut/zeax (ICAPS AREDS2 ORAL) Take by mouth. Focus select AREDS2 - 1 twice daily       No current facility-administered medications for this visit.       Objective:   Vital Signs:   Visit Vitals  /65   Pulse (!) 57   Resp 16   Ht 5' 1\" (1.549 m)   Wt 140 lb 9.6 oz (63.8 kg)   LMP  (LMP Unknown)   BMI 26.57 kg/m           VisionScreening:  No exam data present     PHYSICAL EXAM    Physical Exam:  General Appearance: Alert, cooperative, no distress, appears stated age  Head: Normocephalic, without obvious abnormality, atraumatic  Eyes: PERRL, conjunctiva/corneas clear, EOM's intact  Ears: Normal TM's and external ear canals, both ears  Nose: Nares normal, septum midline,mucosa normal, no drainage  Throat: Lips, mucosa, and tongue normal; teeth and gums normal  Neck: Supple, symmetrical, trachea midline, no adenopathy; thyroid: not enlarged, symmetric, no tenderness/mass/nodules; no carotid bruit  Back: Symmetric, no curvature, ROM normal, no CVA tenderness  Lungs: Clear to auscultation bilaterally, respirations unlabored  Breasts: No breast masses, tenderness, asymmetry, or nipple discharge.  Bilateral breast implants.  Heart: Regular rate and rhythm, S1 and S2 normal, no murmur, rub, or gallop,   Abdomen: Soft, non-tender, bowel sounds active all four quadrants,  " no masses, no organomegaly, colostomy  Extremities: Extremities normal, atraumatic, no cyanosis or edema  Skin: Skin color, texture, turgor normal, no rashes or lesions  Lymph nodes: Cervical, supraclavicular, and axillary nodes normal  Neurologic: Normal          Assessment Results 3/18/2021   Activities of Daily Living No help needed   Instrumental Activities of Daily Living No help needed   Mini Cog Total Score 5   Some recent data might be hidden     A Mini-Cog score of 0-2 suggests the possibility of dementia, score of 3-5 suggests no dementia    Identified Health Risks:     She is at risk for lack of exercise and has been provided with information to increase physical activity for the benefit of her well-being.  The patient was counseled and encouraged to consider modifying their diet and eating habits. She was provided with information on recommended healthy diet options.  Information on urinary incontinence and treatment options given to patient.  Patient's advanced directive was discussed and I am comfortable with the patient's wishes.         128

## 2021-06-16 NOTE — TELEPHONE ENCOUNTER
Telephone Encounter by Mai Sanchez at 1/4/2019 11:53 AM     Author: Mai Sanchez Service: -- Author Type: --    Filed: 1/4/2019 11:54 AM Encounter Date: 1/3/2019 Status: Signed    : Mai Sanchez approved 1/3/19-1/03/2021

## 2021-06-16 NOTE — TELEPHONE ENCOUNTER
Telephone Encounter by Mai Sanchez at 1/3/2019 11:17 AM     Author: Mai Sanchez Service: -- Author Type: --    Filed: 1/3/2019 11:22 AM Encounter Date: 1/3/2019 Status: Signed    : Mai Sanchez initiated through [facility]

## 2021-06-17 NOTE — TELEPHONE ENCOUNTER
Ostomy Supply order with DX: chronic ulcerative colitis K51.918  faxed to [facility]. Fax: [phone]

## 2021-06-17 NOTE — TELEPHONE ENCOUNTER
"Spoke to Lorena to get more info. They said this is not being covered by insurance due to diagnosis the last couple times they have tried to bill it. I am wondering if it would make a difference to add the \"chronic\" to the diagnosis since that is how it is documented on the Colon and Rectal report? Or maybe add post colon resection? I will prep a new order for you if you are ok with updating that diagnosis  "

## 2021-06-17 NOTE — TELEPHONE ENCOUNTER
Reason for Call: Request for an order or referral:    Order or referral being requested: Ostomy supplies    Date needed: as soon as possible    Has the patient been seen by the PCP for this problem? YES    Additional comments: pt has been receiving ostomy supplies from [facility] [employer] but her insurance is not covering as the diagnosis does not say what time of colostomy she has, only states colitis.  [facility] is requesting a new order with update dx code    Fax # [phone]    Phone number Patient can be reached at:  Other phone number:  [phone]*    Best Time:  n    Can we leave a detailed message on this number?  Yes    Call taken on 5/24/2021 at 3:06 PM by Julia Dunbar

## 2021-06-17 NOTE — PATIENT INSTRUCTIONS - HE
Patient Instructions by Giovanna Parrish MD at 4/23/2019 11:20 AM     Author: Giovanna Parrish MD Service: -- Author Type: Physician    Filed: 4/23/2019 12:00 PM Encounter Date: 4/23/2019 Status: Signed    : Giovanna Parrish MD (Physician)         Patient Education     Exercise for a Healthier Heart  You may wonder how you can improve the health of your heart. If youre thinking about exercise, youre on the right track. You dont need to become an athlete, but you do need a certain amount of brisk exercise to help strengthen your heart. If you have been diagnosed with a heart condition, your doctor may recommend exercise to help stabilize your condition. To help make exercise a habit, choose safe, fun activities.       Be sure to check with your health care provider before starting an exercise program.    Why exercise?  Exercising regularly offers many healthy rewards. It can help you do all of the following:    Improve your blood cholesterol levels to help prevent further heart trouble    Lower your blood pressure to help prevent a stroke or heart attack    Control diabetes, or reduce your risk of getting this disease    Improve your heart and lung function    Reach and maintain a healthy weight    Make your muscles stronger and more limber so you can stay active    Prevent falls and fractures by slowing the loss of bone mass (osteoporosis)    Manage stress better  Exercise tips  Ease into your routine. Set small goals. Then build on them.  Exercise on most days. Aim for a total of 150 or more minutes of moderate to  vigorous intensity activity each week. Consider 40 minutes, 3 to 4 times a week. For best results, activity should last for 40 minutes on average. It is OK to work up to the 40 minute period over time. Examples of moderate-intensity activity is walking one mile in 15 minutes or 30 to 45 minutes of yard work.  Step up your daily activity level. Along with your exercise program, try being more active  throughout the day. Walk instead of drive. Do more household tasks or yard work.  Choose one or more activities you enjoy. Walking is one of the easiest things you can do. You can also try swimming, riding a bike, or taking an exercise class.  Stop exercising and call your doctor if you:    Have chest pain or feel dizzy or lightheaded    Feel burning, tightness, pressure, or heaviness in your chest, neck, shoulders, back, or arms    Have unusual shortness of breath    Have increased joint or muscle pain    Have palpitations or an irregular heartbeat      2089-0976 [employer]. [address] [place] [id number]. All rights reserved. This information is not intended as a substitute for professional medical care. Always follow your healthcare professional's instructions.         Patient Education   Understanding [employer] MyPlate  The USDA (US Department of Agriculture) has guidelines to help you make healthy food choices. These are called MyPlate. MyPlate shows the food groups that make up healthy meals using the image of a place setting. Before you eat, think about the healthiest choices for what to put onto your plate or into your cup or bowl. To learn more about building a healthy plate, visit www.choosemyplate.gov.       The Food Groups    Fruits: Any fruit or 100% fruit juice counts as part of the Fruit Group. Fruits may be fresh, canned, frozen, or dried, and may be whole, cut-up, or pureed. Make half your plate fruits and vegetables.    Vegetables: Any vegetable or 100% vegetable juice counts as a member of the Vegetable Group. Vegetables may be fresh, frozen, canned, or dried. They can be served raw or cooked and may be whole, cut-up, or mashed. Make half your plate fruits and vegetables.     Grains: All foods made from grains are part of the Grains Group. These include wheat, rice, oats, cornmeal, and barley such as bread, pasta, oatmeal, cereal, tortillas, and grits. Grains should be no more  than a quarter of your plate. At least half of your grains should be whole grains.    Protein: This group includes meat, poultry, seafood, beans and peas, eggs, processed soy products (like tofu), nuts (including nut butters), and seeds. Make protein choices no more than a quarter of your plate. Meat and poultry choices should be lean or low fat.    Dairy: All fluid milk products and foods made from milk that contain calcium, like yogurt and cheese are part of the Dairy Group. (Foods that have little calcium, such as cream, butter, and cream cheese, are not part of the group.) Most dairy choices should be low-fat or fat-free.    Oils: These are fats that are liquid at room temperature. They include canola, corn, olive, soybean, and sunflower oil. Foods that are mainly oil include mayonnaise, certain salad dressings, and soft margarines. You should have only 5 to 7 teaspoons of oils a day. You probably already get this much from the food you eat.  Use [employer] to Help Build Your Meals  The SuperTracker can help you plan and track your meals and activity. You can look up individual foods to see or compare their nutritional value. You can get guidelines for what and how much you should eat. You can compare your food choices. And you can assess personal physical activities and see ways you can improve. Go to www.[employer].gov/supertracker/.    6304-3881 [employer]. [address] [place], [place] [id number]. All rights reserved. This information is not intended as a substitute for professional medical care. Always follow your healthcare professional's instructions.           Patient Education    Home Fire Safety  Each year, thousands of people, including children, are injured and killed in home fires. Children are often curious about fire, and may not understand the dangers. This makes home fire safety practices especially important. Three important things you can do to keep your home safe from  fire are:    Install smoke alarms in your home and make sure they work properly.    Teach children not to play with matches, lighters, and other materials that can be used to start fires. And keep these materials out of childrens reach.    Teach children what to do in case of fire. Create a fire safety action plan and practice it.  Read on for more details about keeping your family and home safe from fire.        Being Prepared for a Fire  A home fire can happen at any time. The following can help you be prepared:    Install smoke alarms on every level of your home, including the basement and outside all sleeping areas. This simple step cuts your familys risk of dying in a fire nearly in half.    Test smoke alarms monthly, and change the batteries once a year or when the alarm chirps.    Dont disable smoke alarms, even for a short time.    Ask your local fire department for tips on where to place smoke alarms in your home.    Replace all smoke alarms every 10 years.    Consider using voice smoke alarms. These alarms allow you to substitute your own voice for the alarm sound. They are helpful because many children dont wake up to the sound of a regular smoke alarm.    Install carbon monoxide detectors near sleeping areas.    Be aware that carbon monoxide is a byproduct of smoke that can be deadly. Its a gas that you cant see, smell, or taste.    Consider buying a combination smoke alarm / carbon monoxide detector.    Keep fire extinguishers in the home.    Keep them in accessible locations, especially in the kitchen.    Check usage dates to make sure they are not .    Use fire extinguishers only when the fire is in a contained area and is not spreading. (Otherwise, you should focus on getting out of the home.)    Train adults to use fire extinguishers. (Children should focus on getting out of the home during a fire.)    If you live in an apartment, talk to your landlord about where smoke alarms are and how  often they are tested. Also ask about fire extinguisher locations and emergency exit routes.  Indoor Fire Safety  Many things in your home are potential fire hazards. Follow these steps to help keep your home safe.    Be careful in the kitchen.    Never leave food thats cooking unattended.    If a fire breaks out in a cooking pan, put a lid on it to smother it. And never throw water on a grease fire. It will make the fire worse.    Conduct a home safety inspection. Look for anything, such as frayed wires and cords, that can cause a fire. Fix or remove any fire safety hazards you find.    Keep all matches and lighters in a secured drawer or cabinet out of the reach of children. Use childproof lighters.    Check to make sure all appliances, including the stove, are turned off before leaving the home.    Know where the gas main shut-off is located.    Make sure space heaters are stable and have protective covers. Keep them at least 3 feet from anything that can burn, such as curtains. Dont use space heaters in areas where young kids spend time alone.    Keep flammable liquids such as kerosene and gasoline locked up and safely stored away from kids and heat.    Keep all smoking materials out of reach of children. And never smoke in bed. If possible, smoke outdoors only.  Outdoor Fire Safety  Fire can be a hazard outdoors as well as indoors. When outdoors, be sure to do the following:    Always supervise kids near a barbecue grill, campfire, or portable stove.    Dont use fire pits around children. Kids can fall into them, and pits can be hot even after the fire goes out.    Keep a garden hose or fire extinguisher handy when cooking outdoors in case of fire.  Teaching Your Child About Fire Safety  One of the best ways to keep your home safe from fire is education. Make sure everyone in your family knows fire safety rules, including children.    Teach your children the dangers of matches, lighters, and other dangerous  items.    Teach them to never touch these and other objects that are hot, such as candles.    Have them tell you right away whenever they find matches or lighters. Explain that these items are tools for grown-ups, not toys. And never amuse children with matches or lighters.    Round up all matches and lighters and store them safely. In case you missed some, ask your children to tell you where any are located throughout your home.    Never leave a child alone in a room with a lit candle. Dont allow teens to have candles in their rooms.    Show children what to do in case of fire.    Be sure your kids know what the fire alarm sounds like and what to do if it goes off.    Teach kids what to do if their clothes catch fire: Stop, Drop to the ground, and Roll until the fire is put out. They should also cover their face with their hands. Practice these steps with your children. Make sure they understand that running will make the fire burn faster.    Show children how to crawl below smoke during a fire.    Make sure kids know at least two escape routes from each room in the home. These escape routes can be windows.    Teach kids to test doors for nearby fire by feeling for heat with the back of their hand. If the door is warm or hot, they should try their second exit.    Explain to children that they cant hide from a fire. Hiding in a closet or under a bed wont make them safe. Instead, they should try to escape the home. And if they cant escape, they should let others know they are trapped. They can do this by shutting the door to the room, opening a window, and turning on the lights.    Talk to your local fire department.    Introduce your children to a . Let them know that firefighters will look different when in full protective gear. Tell them to never hide from firefighters, and to follow all directions from firefighters during a fire.    Find out if the fire department has a fire safety program for  kids.      Create a Fire Safety Plan  Create a plan for your family to follow in case of a fire. Try making it a family project. Important steps for the plan include leaving the home right away and having a designated meeting place.    Make sure your child understands to get out and stay out. He or she should get out of the home immediately and not go back in, even if family members or pets are still inside.    Decide on a safe meeting place away from the home for everyone to gather.    Teach children to call 911 or emergency services from a cell phone or neighbors phone. Make sure they know to do this only after they are safely out of the home.    Teach your children the fire safety plan. Practice it and make sure they understand it.    Have fire drills twice a year to keep your children prepared in case of fire.    Visit the National Fire Protection Association web site at www.nfpa.org for more information.      4007-7216 [employer]. [address] [place] [address]. All rights reserved. This information is not intended as a substitute for professional medical care. Always follow your healthcare professional's instructions.         Patient Education   Understanding Advance Care Planning  Advance care planning is the process of deciding ones own future medical care. It helps ensure that if you cant speak for yourself, your wishes can still be carried out. The plan is a series of legal documents that note a persons wishes. The documents vary by state. Advance care planning may be done when a person has a serious illness that is expected to get worse. It may be done before major surgery. And it can help you and your family be prepared in case of a major illness or injury. Advance care planning helps with making decisions at these times.       A health care proxy is a person who acts as the voice of a patient when the patient cant speak for himself or herself. The name of this role varies by  state. It may be called a Durable Medical Power of  or Durable Power of  for Healthcare. It may be called an agent, surrogate, or advocate. Or it may be called a representative or decision maker. It is an official duty that is identified by a legal document. The document also varies by state.    Why Is Advance Care Planning Important?  If a person communicates their healthcare wishes:    They will be given medical care that matches their values and goals.    Their family members will not be forced to make decisions in a crisis with no guidance.  Creating a Plan  Making an advance care plan is often done in 3 steps:    Thinking about ones wishes. To create an advance care plan, you should think about what kind of medical treatment you would want if you lose the ability to communicate. Are there any situations in which you would refuse or stop treatment? Are there therapies you would want or not want? And whom do you want to make decisions for you? There are many places to learn more about how to plan for your care. Ask your doctor or  for resources.    Picking a health care proxy. This means choosing a trusted person to speak for you only when you cant speak for yourself. When you cannot make medical decisions, your proxy makes sure the instructions in your advance care plan are followed. A proxy does not make decisions based on his or her own opinions. They must put aside those opinions and values if needed, and carry out your wishes.    Filling out the legal documents. There are several kinds of legal documents for advance care planning. Each one tells health care providers your wishes. The documents may vary by state. They must be signed and may need to be witnessed or notarized. You can cancel or change them whenever you wish. Depending on your state, the documents may include a Healthcare Proxy form, Living Will, Durable Medical Power of , Advance Directive, or others.  The  Familys Role  The best help a family can give is to support their loved ones wishes. Open and honest communication is vital. Family should express any concerns they have about the patients choices while the patient can still make decisions.    9187-9484 [employer]. [address] [place] [address], [place] [address]. All rights reserved. This information is not intended as a substitute for professional medical care. Always follow your healthcare professional's instructions.         Also, [facility] offers a free, downloadable health care directive that allows you to share your treatment choices and personal preferences if you cannot communicate your wishes. It also allows you to appoint another person (called a health care agent) to make health care decisions if you are unable to do so. You can download an advance directive by going here: [email or web address]     Patient Education   Personalized Prevention Plan  You are due for the preventive services outlined below.  Your care team is available to assist you in scheduling these services.  If you have already completed any of these items, please share that information with your care team to update in your medical record.  Health Maintenance   Topic Date Due   ? ADVANCE DIRECTIVES DISCUSSED WITH PATIENT  12/22/1968   ? ZOSTER VACCINES (1 of 2) 12/22/1999   ? FALL RISK ASSESSMENT  08/02/2019   ? DXA SCAN  03/12/2020   ? MAMMOGRAM  11/01/2020   ? TD 18+ HE  02/11/2024   ? PNEUMOCOCCAL POLYSACCHARIDE VACCINE AGE 65 AND OVER  Completed   ? INFLUENZA VACCINE RULE BASED  Completed   ? PNEUMOCOCCAL CONJUGATE VACCINE FOR ADULTS (PCV13 OR PREVNAR)  Completed   ? COLONOSCOPY  Discontinued

## 2021-06-18 NOTE — PATIENT INSTRUCTIONS - HE
Patient Instructions by Giovanna Parrish MD at 3/18/2021 10:00 AM     Author: Giovanna Parrish MD Service: -- Author Type: Physician    Filed: 3/18/2021 10:43 AM Encounter Date: 3/18/2021 Status: Addendum    : Giovanna Parrish MD (Physician)    Related Notes: Original Note by Giovanna Parrish MD (Physician) filed at 3/18/2021 10:40 AM         Patient Education     Exercise for a Healthier Heart  You may wonder how you can improve the health of your heart. If youre thinking about exercise, youre on the right track. You dont need to become an athlete, but you do need a certain amount of brisk exercise to help strengthen your heart. If you have been diagnosed with a heart condition, your doctor may recommend exercise to help stabilize your condition. To help make exercise a habit, choose safe, fun activities.       Be sure to check with your health care provider before starting an exercise program.    Why exercise?  Exercising regularly offers many healthy rewards. It can help you do all of the following:    Improve your blood cholesterol levels to help prevent further heart trouble    Lower your blood pressure to help prevent a stroke or heart attack    Control diabetes, or reduce your risk of getting this disease    Improve your heart and lung function    Reach and maintain a healthy weight    Make your muscles stronger and more limber so you can stay active    Prevent falls and fractures by slowing the loss of bone mass (osteoporosis)    Manage stress better  Exercise tips  Ease into your routine. Set small goals. Then build on them.  Exercise on most days. Aim for a total of 150 or more minutes of moderate to  vigorous intensity activity each week. Consider 40 minutes, 3 to 4 times a week. For best results, activity should last for 40 minutes on average. It is OK to work up to the 40 minute period over time. Examples of moderate-intensity activity is walking one mile in 15 minutes or 30 to 45 minutes of yard  work.  Step up your daily activity level. Along with your exercise program, try being more active throughout the day. Walk instead of drive. Do more household tasks or yard work.  Choose one or more activities you enjoy. Walking is one of the easiest things you can do. You can also try swimming, riding a bike, or taking an exercise class.  Stop exercising and call your doctor if you:    Have chest pain or feel dizzy or lightheaded    Feel burning, tightness, pressure, or heaviness in your chest, neck, shoulders, back, or arms    Have unusual shortness of breath    Have increased joint or muscle pain    Have palpitations or an irregular heartbeat      9848-4058 [employer]. [address] [place] [address], [place] [address]. All rights reserved. This information is not intended as a substitute for professional medical care. Always follow your healthcare professional's instructions.         Patient Education   Understanding [employer] MyPlate  The USDA (US Department of Agriculture) has guidelines to help you make healthy food choices. These are called MyPlate. MyPlate shows the food groups that make up healthy meals using the image of a place setting. Before you eat, think about the healthiest choices for what to put onto your plate or into your cup or bowl. To learn more about building a healthy plate, visit www.choosemyplate.gov.       The Food Groups    Fruits: Any fruit or 100% fruit juice counts as part of the Fruit Group. Fruits may be fresh, canned, frozen, or dried, and may be whole, cut-up, or pureed. Make half your plate fruits and vegetables.    Vegetables: Any vegetable or 100% vegetable juice counts as a member of the Vegetable Group. Vegetables may be fresh, frozen, canned, or dried. They can be served raw or cooked and may be whole, cut-up, or mashed. Make half your plate fruits and vegetables.     Grains: All foods made from grains are part of the Grains Group. These include wheat, rice, oats,  cornmeal, and barley such as bread, pasta, oatmeal, cereal, tortillas, and grits. Grains should be no more than a quarter of your plate. At least half of your grains should be whole grains.    Protein: This group includes meat, poultry, seafood, beans and peas, eggs, processed soy products (like tofu), nuts (including nut butters), and seeds. Make protein choices no more than a quarter of your plate. Meat and poultry choices should be lean or low fat.    Dairy: All fluid milk products and foods made from milk that contain calcium, like yogurt and cheese are part of the Dairy Group. (Foods that have little calcium, such as cream, butter, and cream cheese, are not part of the group.) Most dairy choices should be low-fat or fat-free.    Oils: These are fats that are liquid at room temperature. They include canola, corn, olive, soybean, and sunflower oil. Foods that are mainly oil include mayonnaise, certain salad dressings, and soft margarines. You should have only 5 to 7 teaspoons of oils a day. You probably already get this much from the food you eat.  Use [employer] Plan to Help Build Your Meals  The SuperTracker can help you plan and track your meals and activity. You can look up individual foods to see or compare their nutritional value. You can get guidelines for what and how much you should eat. You can compare your food choices. And you can assess personal physical activities and see ways you can improve. Go to www.WorldMateplate.gov/supertracker/.    6535-3525 [employer]. [address] [place] [address], [place] [address]. All rights reserved. This information is not intended as a substitute for professional medical care. Always follow your healthcare professional's instructions.           Patient Education   Urinary Incontinence, Female (Adult)  Urinary incontinence means loss of control of the bladder. This problem affects many women, especially as they get older. If you have incontinence, you may be  embarrassed to ask for help. But know that this problem can be treated.  Types of Incontinence  There are different types of incontinence. Two of the main types are described here. You can have more than one type.    Stress incontinence. With this type, urine leaks when pressure (stress) is put on the bladder. This may happen when you cough, sneeze, or laugh. Stress incontinence most often occurs because the pelvic floor muscles that support the bladder and urethra are weak. This can happen after pregnancy and vaginal childbirth or a hysterectomy. It can also be due to excess body weight or hormone changes.    Urge incontinence (also called overactive bladder). With this type, a sudden urge to urinate is felt often. This may happen even though there may not be much urine in the bladder. The need to urinate often during the night is common. Urge incontinence most often occurs because of bladder spasms. This may be due to bladder irritation or infection. Damage to bladder nerves or pelvic muscles, constipation, and certain medicines can also lead to urge incontinence.  Treatment of urinary incontinence depends on the cause. Further evaluation is needed to find the type you have. This will likely include an exam and certain tests. Based on the results, you and your healthcare provider can then plan treatment. Until a diagnosis is made, the home care tips below can help relieve symptoms.  Home care    Do pelvic floor muscle exercises, if they are prescribed. The pelvic floor muscles help support the bladder and urethra. Many women find that their symptoms improve when doing special exercises that strengthen these muscles. To do the exercises contract the muscles you would use to stop your stream of urine, but do this when youre not urinating. Hold for 10 seconds, then relax. Repeat 10 to 20 times in a row, at least 3 times a day. Your provider may give you other instructions for how to do the exercises and how  often.    Keep a bladder diary. This helps track how often and how much you urinate over a set period of time. Bring this diary with you to your next visit with the provider. The information can help your provider learn more about your bladder problem.    Lose weight, if advised to by your provider. Excess weight puts pressure on the bladder. Your provider can help you create a weight-loss plan thats right for you. This may include exercising more and making certain diet changes.    Don't consume foods and drinks that may irritate the bladder. These can include alcohol and caffeinated drinks.    Quit smoking. Smoking and other tobacco use can lead to chronic cough that strains the pelvic floor muscles. Smoking may also damage the bladder and urethra. Talk with your provider about treatments or methods you can use to quit smoking.    If drinking large amounts of fluid causes you to have symptoms, you may be advised to limit your fluid intake. You may also be advised to drink most of your fluids during the day and to limit fluids at night.    If youre worried about urine leakage or accidents, you may wear absorbent pads to catch urine. Change the pads often. This helps reduce discomfort. It may also reduce the risk of skin or bladder infections.  Follow-up care  Follow up with your healthcare provider, or as directed. It may take some to find the right treatment for your problem. Your treatment plan may include special therapies or medicines. Certain procedures or surgery may also be options. Be sure to discuss any questions you have with your provider.  When to seek medical advice  Call the healthcare provider right away if any of these occur:    Fever of 100.4 F (38 C) or higher, or as directed by your provider    Bladder pain or fullness    Abdominal swelling    Nausea or vomiting    Back pain    Weakness, dizziness or fainting  Date Last Reviewed: 10/1/2017    1916-7166 [employer]. [address] [place] [address]  Road, [place] [address]. All rights reserved. This information is not intended as a substitute for professional medical care. Always follow your healthcare professional's instructions.       Advance Directive  Patients advance directive was discussed and I am comfortable with the patients wishes.  Patient Education   Personalized Prevention Plan  You are due for the preventive services outlined below.  Your care team is available to assist you in scheduling these services.  If you have already completed any of these items, please share that information with your care team to update in your medical record.  Health Maintenance Due   Topic Date Due   ? HEPATITIS C SCREENING  Never done   ? COVID-19 Vaccine (1) Never done   ? ADVANCE CARE PLANNING  Never done   ? ZOSTER VACCINES (1 of 2) Never done
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
## 2021-06-19 NOTE — PROGRESS NOTES
"Assessment/ Plan     1. Chronic cough  Patient is having prolonged chronic cough of unclear etiology.  At this point would go ahead and treat for sinusitis and reflux even though she is not having symptoms.  Would have her do some nasal Flonase and some Prilosec.  Her chest x-ray and office spirometry were normal.  Would recommend an opinion from pulmonary at this point if symptoms are not improving over the next 1 month.  - XR Chest 2 Views  - Spirometry without bronchodilator  - fluticasone (FLONASE) 50 mcg/actuation nasal spray; 2 sprays into each nostril daily.  Dispense: 16 g; Refill: 0  - omeprazole (PRILOSEC) 20 MG capsule; Take 1 capsule (20 mg total) by mouth 2 (two) times a day.  Dispense: 60 capsule; Refill: 0  - Ambulatory referral to Pulmonology    Addendum 8/20/18:  There was a delay in the official reading of the chest x-ray.  It showed possible pneumonia.  I called and talked to the patient and sent in a prescription for azithromycin.  She will follow-up with me in 4 weeks.  At that time we will repeat the chest x-ray.  If it has not returned to normal at that time would proceed with a chest CT.    Subjective:       Lauren Dhaliwal is a 67 y.o. female who presents for continued cough.  Patient states that she thinks this started about a year ago.  She really cannot remember if it started with a cold or just started out of the blue.  I actually saw her in February for this.  She had a normal chest x-ray and normal basic allergy testing that we did in the clinic.  She states that she has continued to cough since then.  She states is often dry, but sometimes it is productive.  She states she sounds like a \"smoker's cough\".  She has never smoked in her life.  Tends to be worse when she is laying down.  She denies shortness of breath except for when she gets in a coughing fit.  She has had no prior diagnosis of asthma or wheezing.  She is not having any itchy watery eyes.  She does not really feel like she " "is having nasal congestion or postnasal drip.  She denies heartburn or reflux symptoms.  She feels like her symptoms started after she had her colectomy for ulcerative colitis about a year ago.  She is not having fevers or ill symptoms.  It is not interfering with her usual activities.  He is really not doing much for regular exercise.  Is not feeling short of breath.  She does not have any chest pain except for when she coughs for quite a while her chest will feel somewhat sore.  She has not been around anybody that has been sick and has no significant exposures.  She has no risk factors for TB or whooping cough.  She has no unusual pets and no travel.    Relevant past medical, family, surgical, and social history reviewed with patient, unless noted in HPI, not pertinent for this visit.    Review of Systems   A 12 point comprehensive review of systems was negative except as noted.      Current Outpatient Prescriptions   Medication Sig Dispense Refill     alendronate (FOSAMAX) 70 MG tablet Take 1 tablet (70 mg total) by mouth every 7 days. Take in the morning on an empty stomach with a full glass of water 30 minutes before food 3 tablet 4     fluticasone (FLONASE) 50 mcg/actuation nasal spray 2 sprays into each nostril daily. 16 g 0     omeprazole (PRILOSEC) 20 MG capsule Take 1 capsule (20 mg total) by mouth 2 (two) times a day. 60 capsule 0     No current facility-administered medications for this visit.        Objective:      /68  Pulse 77  Temp 97.8  F (36.6  C) (Oral)   Resp 20  Ht 5' 1.5\" (1.562 m)  Wt 128 lb (58.1 kg)  SpO2 96%  BMI 23.79 kg/m2      General appearance: alert, appears stated age and cooperative  Head: Normocephalic, without obvious abnormality, atraumatic  Eyes: conjunctivae/corneas clear.  Ears: normal TM's and external ear canals both ears  Nose: Nares normal. Septum midline.  Turbinates appear a little boggy.  No maxillary or frontal sinus tenderness  Throat: lips, mucosa, and " tongue normal; teeth and gums normal  Neck: no adenopathy  Lungs: clear to auscultation bilaterally  Heart: regular rate and rhythm, S1, S2 normal, no murmur, click, rub or gallop    Chest x-ray: Personally reviewed, appeared normal to me.  However, official read showed possible pneumonia.  There was a delay in the read by 2 weeks.    Office spirometry was normal.  FEV1 was 105% predicted.    No results found for this or any previous visit (from the past 168 hour(s)).       This note has been dictated using voice recognition software. Any grammatical or context distortions are unintentional and inherent to the software
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
## 2021-06-20 NOTE — PROGRESS NOTES
Assessment/ Plan     1. Pneumonia  Patient has had a chronic cough now for about a year with an abnormal chest x-ray.  The infiltrates have not resolved with 2 courses of antibiotics.  Would recommend a chest CT at this point with possible follow-up to pulmonary pending results.  - XR Chest 2 Views    2. Ulcerative (chronic) enterocolitis (H)  Stable now that she has had a colectomy.      Subjective:       Lauren Dhaliwal is a 67 y.o. female who presents for follow-up pneumonia.  Patient has been coughing for approximately 1 year.  I have seen her on several occasions.  We have been treating her for sinusitis and postnasal drip, reflux, and reactive airway disease as her cough was of unclear etiology.  She was given a course of azithromycin after her last chest x-ray was read by radiology as possible pneumonia.  She was still coughing but felt better after the azithromycin.  Therefore, I switched her over to Levaquin.  She has been on it now for 6 days.  She states that the cough is definitely improved.  She has never had any fevers throughout all of this.  She has not had any shortness of breath.    Relevant past medical, family, surgical, and social history reviewed with patient, unless noted in HPI, not pertinent for this visit.    Review of Systems   A 12 point comprehensive review of systems was negative except as noted.      Current Outpatient Prescriptions   Medication Sig Dispense Refill     alendronate (FOSAMAX) 70 MG tablet Take 1 tablet (70 mg total) by mouth every 7 days. Take in the morning on an empty stomach with a full glass of water 30 minutes before food 3 tablet 4     fluticasone (FLONASE) 50 mcg/actuation nasal spray 2 sprays into each nostril daily. 16 g 0     omeprazole (PRILOSEC) 20 MG capsule Take 1 capsule (20 mg total) by mouth 2 (two) times a day. 60 capsule 0     No current facility-administered medications for this visit.        Objective:      /58  Pulse 66  Temp 97.6  F (36.4  " C) (Oral)   Resp 16  Ht 5' 1.5\" (1.562 m)  Wt 128 lb (58.1 kg)  SpO2 98%  BMI 23.79 kg/m2      General appearance: alert, appears stated age and cooperative  Lungs: clear to auscultation bilaterally  Heart: regular rate and rhythm, S1, S2 normal, no murmur, click, rub or gallop      Chest x-ray: Personally reviewed, no change       INDICATION: Follow  up pneumonia and abn xray, compare to previous please  COMPARISON: 2/1/2018 and 8/2/2018.     FINDINGS: The left lateral basilar opacities persist and appear relatively stable. There are new posterior basilar opacities that may be additional consolidation or atelectasis/pneumonia. No pleural effusion. Heart is normal in size. Old healed left rib   fractures and compression deformities in the spine are stable. Incidental breast implant capsular calcifications. Scoliosis.     NOTE: ABNORMAL REPORT     THE DICTATION ABOVE DESCRIBES AN ABNORMALITY FOR WHICH FOLLOW-UP IS NEEDED.     No results found for this or any previous visit (from the past 168 hour(s)).       This note has been dictated using voice recognition software. Any grammatical or context distortions are unintentional and inherent to the software  "

## 2021-06-20 NOTE — PROGRESS NOTES
Assessment/ Plan     1. Pneumonia  Patient is here for follow-up of pneumonia and chronic cough.  She states that she symptomatically feels a lot better but still having somewhat of a productive cough.  She had a little bit of wheezing on exam so we will have her try an albuterol inhaler.  If all else is going well, we will plan to see her back in December for repeat chest CT.  If that is not clearing at this point, would send her to pulmonology.  She has completed a course of azithromycin, Levaquin, and clindamycin.  - albuterol (PROAIR HFA;PROVENTIL HFA;VENTOLIN HFA) 90 mcg/actuation inhaler; Inhale 2 puffs every 6 (six) hours as needed for wheezing or shortness of breath.  Dispense: 1 Inhaler; Refill: 11    2. Visit for screening mammogram    - Mammo Screening Bilateral; Future    3. Ulcerative (chronic) enterocolitis (H)  Symptoms resolved now that she has had a colectomy.      Subjective:       Lauren Dhaliwal is a 67 y.o. female who presents for follow-up of chronic cough and pneumonia.  Patient has had a chronic cough now for about a year.  We have been dealing with pneumonia and now for several months.  This initially showed up on her chest x-ray and then a chest CT confirmed.  She had initially done azithromycin and then Levaquin.  The CT pattern suggested aspiration pneumonia so I switched over to clindamycin.  She comes in today for follow-up before she goes out of town.  She states she did well with the clindamycin.  She states she feels really good she still is having just a little bit of a cough that is occasionally productive.  She not having any shortness of breath.  The cough is not keeping her up at night.  She said no fevers or systemic symptoms.  She is planning on going to [place] for a couple of weeks.  Discussed that if things continue to improve, I will see her back in December and will do a repeat chest CT.  However, if symptoms worsen or she not feeling well, would want to see her back  "sooner.    Relevant past medical, family, surgical, and social history reviewed with patient, unless noted in HPI, not pertinent for this visit.    Review of Systems   A 12 point comprehensive review of systems was negative except as noted.      Current Outpatient Prescriptions   Medication Sig Dispense Refill     alendronate (FOSAMAX) 70 MG tablet Take 1 tablet (70 mg total) by mouth every 7 days. Take in the morning on an empty stomach with a full glass of water 30 minutes before food 3 tablet 4     fluticasone (FLONASE) 50 mcg/actuation nasal spray 2 sprays into each nostril daily. 16 g 0     omeprazole (PRILOSEC) 20 MG capsule Take 1 capsule (20 mg total) by mouth 2 (two) times a day. 60 capsule 0     albuterol (PROAIR HFA;PROVENTIL HFA;VENTOLIN HFA) 90 mcg/actuation inhaler Inhale 2 puffs every 6 (six) hours as needed for wheezing or shortness of breath. 1 Inhaler 11     No current facility-administered medications for this visit.        Objective:      /62  Pulse 81  Temp 98.5  F (36.9  C) (Oral)   Resp 16  Ht 5' 1.5\" (1.562 m)  Wt 132 lb (59.9 kg)  SpO2 97%  BMI 24.54 kg/m2      General appearance: alert, appears stated age and cooperative  Head: Normocephalic, without obvious abnormality, atraumatic  Eyes: conjunctivae/corneas clear.  Ears: normal TM's and external ear canals both ears  Nose: Nares normal. Septum midline. Mucosa normal. No drainage or sinus tenderness.  Throat: lips, mucosa, and tongue normal; teeth and gums normal  Neck: no adenopathy  Lungs: clear to auscultation bilaterally except for bibasilar expiratory wheeze  Heart: regular rate and rhythm, S1, S2 normal, no murmur, click, rub or gallop      No results found for this or any previous visit (from the past 168 hour(s)).       This note has been dictated using voice recognition software. Any grammatical or context distortions are unintentional and inherent to the software  "

## 2021-06-20 NOTE — LETTER
Letter by Giovanna Parrish MD at      Author: Giovanna Parrish MD Service: -- Author Type: --    Filed:  Encounter Date: 10/13/2020 Status: (Other)              [place] [address]  Phone Number:  [phone]       10/13/2020                 Lauren Dhaliwal               :  1950                    RX:  [address] [facility] [id number]  Qty #  supply  DX: [id number]  Ulcerative colitis s/p colectomy  Duration: 99 months          ------------------------------------------              Giovanna Parrish MD

## 2021-06-22 NOTE — PROGRESS NOTES
Assessment/ Plan     1. Chronic cough  Patient is now had a chronic cough for over a year.  This did not respond to initial treatment with PPI, inhalers, Flonase or antihistamines.  In late summer, she had a chest x-ray that showed pneumonia.  She was treated with azithromycin and then Levaquin.  When things were not resolving, he had a chest CT which showed a pneumonia that could be consistent with an aspiration pattern.  I therefore treated her with a course of clindamycin.  She states that she symptomatically feels better, but still has this cough occasionally in the evening.  She is due to have a repeat CT scan.  Will then plan referral to Pulmonary depending on results.  - CT Chest Without Contrast; Future    2. Pneumonia due to infectious organism, unspecified laterality, unspecified part of lung  - CT Chest Without Contrast; Future    Addendum: 12/16/18: CT of chest returned with some worsening of infiltrates and improvement in others.  Would recommend treating her with antibiotics with clindamycin.  Will refer to pulmonary opinion on further workup and treatment.    Subjective:       Lauren Dhaliwal is a 67 y.o. female who presents for follow-up of chronic cough and pneumonia.  This is been an ongoing issue for this patient for quite some time.  The cough is been for over a year.  We did all the conservative things that we could think of.  Initially she had negative chest x-rays.  This changed in late summer when she had a chest x-ray that showed pneumonia.  She was treated with azithromycin and Levaquin.  The repeat chest x-ray was still abnormal so I proceeded with a chest CT.  (Please see below).  Because the pattern was suggestive of aspiration, she was treated with clindamycin.  She symptomatically improved with this and felt much better.  However, she still has this chronic cough that is worse in the evenings.  She has no risk factors for aspiration we talked about this in the past.  She did not think  "that inhalers were helpful in the past even though she has a little bit of a wheeze today.    Relevant past medical, family, surgical, and social history reviewed with patient, unless noted in HPI, not pertinent for this visit.    Review of Systems   A 12 point comprehensive review of systems was negative except as noted.      No current outpatient medications on file.     No current facility-administered medications for this visit.        Objective:      /62   Pulse 80   Temp 98  F (36.7  C)   Resp 16   Ht 5' 1.5\" (1.562 m)   Wt 132 lb (59.9 kg)   SpO2 99%   BMI 24.54 kg/m        General appearance: alert, appears stated age and cooperative    Lungs: Left base still has a few crackles and expiratory wheeze  Heart: regular rate and rhythm, S1, S2 normal, no murmur, click, rub or gallop         CT CHEST WO CONTRAST  9/17/2018 10:18 AM     INDICATION: History of opacities on radiograph and cough. Chronic cough.  TECHNIQUE: Routine chest. Dose reduction techniques were used.  IV CONTRAST: None.  COMPARISON: Chest radiograph 09/11/2018.     FINDINGS:  LUNGS AND PLEURA: Large lung volumes. Mild consolidation persists within the inferior lingula and left lower lobe. A few clustered micronodules in these areas as well. Mild lower lobe predominant bronchiectasis and a few scattered left lower lobe   endobronchial contents. Minimal tree-in-bud nodules in the peripheral right lower lobe. Mild inferior lingular and left lower lobe airway thickening. Retained airway secretions. Negative pleural spaces.     MEDIASTINUM: No adenopathy. Mild coronary calcifications. Nonaneurysmal aorta.     LIMITED UPPER ABDOMEN: Negative.     MUSCULOSKELETAL: Breast prosthetics with peripheral calcification. Severe bony demineralization. Degenerative changes. Moderate to severe T8 and T11 compression fractures without significant change. Prior rib fractures.     IMPRESSION:   CONCLUSION:     1.  Persistent mild inferior lingula and " left lower lobe consolidation, presumed infectious/inflammatory. Additional minimal right lower lobe tree-in-bud nodules, also compatible with infection/inflammation and airways disease. Aspiration is a possible   differential. Recommend 3 month follow-up chest CT to assess for improvement or clearing.     2.  Mild left lower lobe predominant bronchiectasis and airway thickening.           NOTE: ABNORMAL REPORT     THE DICTATION ABOVE DESCRIBES AN ABNORMALITY FOR WHICH FOLLOW-UP IS NEEDED.       Imaging     CT Chest Without Contrast (Order #[id number]) on 9/17/18   Provider Information     Ordering User Authorizing Provider   MD Giovanna Conway MD   Attending Provider(s) PCP Billing Provider   MD Giovanna Conway MD Amy M Medley, MD   Signed by     Signed Date/Time  Phone Pager   TONIA YOUNG              No results found for this or any previous visit (from the past 168 hour(s)).       This note has been dictated using voice recognition software. Any grammatical or context distortions are unintentional and inherent to the software

## 2021-06-22 NOTE — TELEPHONE ENCOUNTER
Reviewed swallow evaluation results: no evidence for aspiration. Called and left a VM requesting a call back and reminder to have labs drawn.      Tonya Mark MD  Pulmonary and Critical Care Medicine  [facility]  Office: [phone]  Pager: [phone]

## 2021-06-22 NOTE — PROGRESS NOTES
Speech Language/Pathology  Videofluoroscopic Swallow Study     Problem:  Patient Active Problem List   Diagnosis     Eye Pain     Fatigue     Osteopenia     Ulcerative colitis (H)     Hyperglycemia     Ulcerative (chronic) enterocolitis (H)     Normocytic anemia     Severe malnutrition (H)     Thrombocytosis (H)       Onset Date: 1/3/2019 (order date)  Reason for Evaluation: Assess physiology and function of the oropharyngeal swallow  Pertinent History: Interstitial lung disease; chronic cough; recurrent pneumonia  Current Diet: Regular textures and thin liquids  Baseline Diet: Regular textures and thin liquids    Patient is a 68-year-old female referred due to concerns of dysphagia.  Patient states that she sometimes coughs when eating and drinking.  She estimates that this occurs at least once a day.  Interestingly, she feels that it happens more often with foods than with liquids.  Patient also notes that she has a harsh cough in the absence of PO intake.  This is quite distressing for her, as she feels that it draws attention to her when she is out in public and disturbs her 's sleep at night.  The purpose of today's study is to evaluate the physiology and function of the oropharyngeal swallow, determine patient's aspiration risk, and establish safe swallowing strategies as appropriate.      Patient presents as alert and cooperative during this evaluation.  She arrived to appointment alone.  An  was not applicable    Patient was given honey-thick, pureed, and thin consistencies of barium, as well as a solid (barium-coated cracker).    Oral Phase:    Dentition/Oral hygiene:  Patient's dentition is intact.  Oral hygiene was good.    Bolus prep and oral control were not impaired.      Anterior-Posterior transit was not impaired.    Premature spillage did not occur with any consistency.    Tongue base retraction was not impaired.    Oral stasis did not occur with any consistency.    Pharyngeal  Phase:    Aspiration did not occur with any consistency.     There was a single episode of minimal laryngeal penetration with thin liquid via cup.  This was shallow and transient in nature.    Swallow response was mildly delayed with honey-thick liquid as well as with thin liquid via cup.  This resulted in pourover past the epiglottis.  Interestingly, swallow response was timely when patient drank thin liquid from a straw.    Epiglottic movement was complete consistently across texture trials.    Pharyngeal stasis was not observed with any consistency.    Pharyngeal constriction was not impaired.    Hyolaryngeal elevation was reduced.  Hyolaryngeal excursion was reduced.    Cricopharyngeal function was not impaired, though patient was noted to have a mildly prominent cricopharyngeus.  Cervical esophageal function was not impaired.    Assessment:    Patient demonstrated no oral dysphagia and no significant pharyngeal dysphagia.  Patient's swallow function is WNL for her age group.    Patient is at minimal aspiration risk with all intake.    Rehab potential is good based on evaluation results.    Recommendations:    Plan: Continue current diet of Regular textures and thin liquids as tolerated      Strategies: Patient to follow standard safe swallowing precautions, including sitting fully upright for all intake, eating slowly, and taking one small bite or sip at a time.  Patient to avoid distractions (e.g., talking, watching TV, using phone/tablet/computer) while eating.    Speech Therapy is not indicated at this time    Referrals:  Patient to continue to follow with Dr. Mark with Pulmonary Critical Care Medicine for ongoing management of cough.    Reviewed history of swallow problem with patient and verbally explained roles of SLP and radiologist.  Verbally explained process of VFSS prior to administration of barium.  Verbally explained results and recommendations to patient.  SLP answered questions and stated that  patient's referring provider, Dr. Mark, will be notified of results and have access to this report in the EMR.  Patient verbalized understanding.    20 dysphagia minutes    Dominique Spangler MA, CCC-SLP

## 2021-06-22 NOTE — PROGRESS NOTES
RESPIRATORY CARE NOTE     Patient Name: Lauren Dhaliwal  Today's Date: 12/21/2018     Complete PFT done. Pt performed tests with good effort. Test results meet ATS standards for repeatability and acceptability. HGB drawn.  Albuterol neb not given.  This is on pts allergy list and pt also refused to have a neb.  Pre bronchodilator only was done.  Results scanned into epic. Pt left in no distress.       Tierney Tse

## 2021-06-22 NOTE — TELEPHONE ENCOUNTER
Prior Authorization Request  Who s requesting:  Pharmacy  Pharmacy Name and Location: [facility]  Medication Name: Levalbuterol 45mcg   Insurance Plan: MED.RX-D WENDY LOCKETT  Insurance Member ID Number:  [id number]    Pt got palpitations from the albuterol.

## 2021-06-22 NOTE — PATIENT INSTRUCTIONS - HE
It was a pleasure seeing you today.    You have some evidence of changes to your lung function, likely from second hand smoke exposure. For this I would recommend trying the new inhaler I sent to your pharmacy. If it doesn't help or causes you side effects you can stop it.    The changes on your CT scan likely reflect inflammatory pneumonia, not an infection (bacterial pneumonia). To know for sure we would need to do a bronchoscopy with a lung biopsy. We could also just treat you with prednisone to see how your symptoms respond. Since you had great difficulty with prednisone in the past, and your symptoms are very mild, I recommend we continue to monitor you. We will check lab tests to see if there is a cause for the inflammation.    We will check your swallow since it sounds like food often goes down the wrong way.      I will see you in 3 months with a repeat CT scan.        Tonya Mark MD  Pulmonary and Critical Care Medicine  [facility]  Office: [phone]  Pager: [phone]

## 2021-06-22 NOTE — PROGRESS NOTES
"Assessment/ Plan     1. Hives  Patient has broke out in hives from the clindamycin.  She only had 2 tablets left, so I think she had what could be considered a full course.  Would recommend that she take some Claritin as she is quite miserable.  We will hold off on any further antibiotics until she sees Pulmonary this Friday.    2. Chronic cough  Patient has had a chronic cough and persistent infiltrates.  Most recently was on clindamycin and developed hives.  She has an evaluation with pulmonary at the end of this week so we will hold off on any further antibiotics.      Subjective:       Lauren Dhaliwal is a 67 y.o. female who presents for evaluation of a rash.  I saw the patient a couple of weeks ago for chronic cough and ongoing infiltrates.  Her CT of her chest showed worsening infiltrates so I placed her on clindamycin as that seemed to help her the most in the past.  She developed a rash on day 9 of the end by a extensive hives on her legs, trunk, and arms.  She did have some mild swelling of her upper lip.  She denies any worsening breathing or wheezing.  She still continues to cough.  She states the antibiotics really did not seem to make much of a difference.  She states that when she took it last time, she felt like it really helped, but not this time.  She is not feeling ill otherwise, no fever normal days.  She just feels pretty tired.  She was afraid to take any medication such as Claritin or Benadryl until she saw me.    Relevant past medical, family, surgical, and social history reviewed with patient, unless noted in HPI, not pertinent for this visit.    Review of Systems   A 12 point comprehensive review of systems was negative except as noted.      No current outpatient medications on file.     No current facility-administered medications for this visit.        Objective:      /60   Pulse 92   Temp 98.3  F (36.8  C)   Resp 20   Ht 5' 1.5\" (1.562 m)   Wt 132 lb (59.9 kg)   BMI 24.54 kg/m  "       General appearance: alert, appears stated age and cooperative  Head: Normocephalic, without obvious abnormality, atraumatic  Eyes: conjunctivae/corneas clear. PERRL, EOM's intact. Fundi benign.  Ears: normal TM's and external ear canals both ears  Nose: Nares normal. Septum midline. Mucosa normal. No drainage or sinus tenderness.  Throat: lips, mucosa, and tongue normal; teeth and gums normal  Neck: no adenopathy  Lungs: clear to auscultation bilaterally, except for a few crackles left base  Heart: regular rate and rhythm, S1, S2 normal, no murmur, click, rub or gallop  Skin: Extensive hives on her thighs, abdomen, arms.  Some bruising from scratching and some excoriations.         No results found for this or any previous visit (from the past 168 hour(s)).       This note has been dictated using voice recognition software. Any grammatical or context distortions are unintentional and inherent to the software
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
## 2021-06-23 NOTE — PROGRESS NOTES
OUTPATIENT OSTOMY ASSESSMENT    Patients mode of arrival: Ambulatory    INTAKE  Type of Stoma: Permanent Ileostomy  Anticipated date of takedown: NA    Diagnosis Pertinent to Stoma:Ulcerative Colitis Date of Diagnosis: Fall 2016  Type of Surgery: Ileostomy    Surgery Date: 2/13/17  Surgeon:Fermin     [facility]: [facility]    Purpose of this visit:Checkup:Yearly    Present for Teaching Session: Patient   Present: NA    Pertinent Information: Patient reports no real issues    Current Equipment:    Product # Brand Description   Pouch [address] 1 1/8 inch convex   Flange      Paste [id number] Adapt ring   Powder      Deodorizer                    Pouch Change Frequency: Weekly  Provider of Care: Emptying: self Pouch Change: self    ASSESSMENT  Stoma Size: Round 1 inches  Protrusion: 1cm  Vascularity: Pink  Mucocutaneous Juncture: Intact  Peristomal Skin: Intact    Wound: No  Current Wound Care: NA    TREATMENT  NA    INTERVENTIONS  Refitting done  Miscellaneous Interventions: Signed up for [employer] [facility] or [employer]    INSTRUCTIONS GIVEN  WOC Role, Anatomy, Fluids: Drink 6-8 glasses of fluids daily  and Ordering supplies    PLAN  Change in Supplies: See Outpatient Ostomy Instructions      Total Time Spent with Patient: 30 minutes.  Education time: 10 minutes.

## 2021-06-23 NOTE — TELEPHONE ENCOUNTER
Called back again to review labs tests, Unable to reach her and left a VM that all lab testing was normal.      Tonya Mark MD  Pulmonary and Critical Care Medicine  [facility]  Office: [phone]  Pager: [phone]

## 2021-06-23 NOTE — TELEPHONE ENCOUNTER
Lauren called to get an explanation of her lab results seen in My Chart. Informed they were all negative.

## 2021-06-23 NOTE — PATIENT INSTRUCTIONS - HE
OUTPATIENT OSTOMY INSTRUCTIONS    Type of Stoma: Ileostomy    Supplier: [facility] [employer]    Equipment:   Product # Brand Description   Pouch [address] 1 1/8 inch convex with filter    Flange      Paste [id number] [employer] Brava moldable ring   Powder      Deodorizer            Optional pouch [address] 1 3/16 flat with filter     Procedure:  Close the bottom of the pouch and then remove backings from adhesive surfaces.  Remove the soiled pouch and discard.  Wash skin with water and dry.  Apply ring: Around stoma  Then: Apply pouching system to stoma site and hold in place for 2-5 minutes.    Pouch change: Twice a week  _____________________________________________________________________    [facility] Nurse Specialist: [facility]  Questions: [phone]      I have received a copy of these instructions, have had an opportunity to ask questions, and have had them answered to my satisfaction.    ________________________________________________________________  Patient Signature and Date    Follow-up Appointment: 1 year  To schedule an appointment call [facility] Scheduling at [phone]

## 2021-06-23 NOTE — TELEPHONE ENCOUNTER
Reviewed ILD connective tissue diease lab work up - all lab tests were negative. Called patient to review, unable to reach her and left VM.      Tonya Mark MD  Pulmonary and Critical Care Medicine  [facility]  Office: [phone]  Pager: [phone]

## 2021-06-23 NOTE — TELEPHONE ENCOUNTER
Dr. Parrish-  See pt's [employer] message and respond via [employer].

## 2021-06-23 NOTE — TELEPHONE ENCOUNTER
Dr. Jas Aguilar called back and said that she just missed your phone call. Would you be able to give her a call back at [phone]? Thank you.

## 2021-06-24 NOTE — TELEPHONE ENCOUNTER
Orders being requested: [facility] faxed an order to [phone] on 2/13/19 for ostomy supplies.  Chetna is calling for status about of this form.  Reason service is needed/diagnosis: history of colectomy and ileostomy  When are orders needed by: as soon as possible  Where to send Orders: To fax number of form  Okay to leave detailed message?  Yes

## 2021-06-24 NOTE — TELEPHONE ENCOUNTER
Spoke to pt, she confirms she has switched medical supply companies.      Spoke to Chetna from [employer] [facility], she will refax order to [phone].

## 2021-06-29 NOTE — PROGRESS NOTES
Progress Notes by Magno Martínez PT at 6/2/2020 10:00 AM     Author: Magno Martínez PT Service: -- Author Type: Physical Therapist    Filed: 6/2/2020 11:16 AM Encounter Date: 6/2/2020 Status: Attested    : Magno Martínez PT (Physical Therapist) Cosigner: Rowena Thompson MD at 6/2/2020  2:32 PM    Attestation signed by Rowena Thompson MD at 6/2/2020  2:32 PM    Agree                     [facility] Certification Request    June 2, 2020      Patient: Lauren Dhaliwal  MR Number: [id number]  YOB: 1950  Date of Visit: 6/2/2020      Dear Dr. Rowena Thompson:    Thank you for this referral.   We are seeing Lauren Dhaliwal in Physical Therapy for BPPV.    Medicare and/or Medicaid requires physician review and approval of the treatment plan. Please review the plan of care and verify that you agree with the therapy plan of care by co-signing this note.      Plan of Care  Authorization / Certification Start Date: 06/02/20  Authorization / Certification End Date: 08/31/20  Authorization / Certification Number of Visits: 4-6  Communication with: Referral Source  Patient Related Instruction: Nature of Condition;Treatment plan and rationale;Self Care instruction;Basis of treatment;Precautions;Next steps;Expected outcome  Times per Week: 1-2  Number of Weeks: 4  Number of Visits: 4-6  Neuromuscular Reeducation: vestibular      Goals:  Pt. will bend: to dress;to clean;Comment  Comment:: without dizziness in 4 weeks.  Patient will look up / down: without vertigo;without dizziness;for drinking;for reading;in 4 weeks        If you have any questions or concerns, please don't hesitate to call.    Sincerely,      Magno Martínez PT        Physician recommendation:     ___ Follow therapist's recommendation        ___ Modify therapy      *Physician co-signature indicates they certify the need for these services furnished within this plan and while under their care.    [facility]  Rehabilitation   Vestibular Initial Evaluation    Patient Name: Lauren Dhaliwal  Date of evaluation: 6/2/2020  PRECAUTIONS: osteoporosis  Referral Diagnosis:      R42 (ICD-10-CM) - Dizziness    R42 (ICD-10-CM) - Lightheadedness    H81.13 (ICD-10-CM) - Benign paroxysmal positional vertigo due to bilateral vestibular disorder      Referring provider: Rowena Thompson MD  Visit Diagnosis:     ICD-10-CM    1. BPPV (benign paroxysmal positional vertigo), right  H81.11        Assessment:      Pt. is appropriate for skilled PT intervention as outlined in the Plan of Care (POC).  Pt. is a good candidate for skilled PT services to improve pain levels and function.  Goals and POC established in collaboration with the patient.    Pt's signs and sx most consistent with R PC BPPV, although no visible nystagmus noted with positional testing in room light. Despite requirement for further authorization, in an attempt to limit future contact considering current COVID crisis, Epley CRT positioning was performed x2 reps today, with decreased intensity and duration of sx noted on second rep.    Goals:  Pt. will bend: to dress;to clean;Comment  Comment:: without dizziness in 4 weeks.  Patient will look up / down: without vertigo;without dizziness;for drinking;for reading;in 4 weeks      Patient's expectations/goals are realistic.    Barriers to Learning or Achieving Goals:  No Barriers.       Plan / Patient Instructions:        Plan of Care:   Authorization / Certification Start Date: 06/02/20  Authorization / Certification End Date: 08/31/20  Authorization / Certification Number of Visits: 4-6  Communication with: Referral Source  Patient Related Instruction: Nature of Condition;Treatment plan and rationale;Self Care instruction;Basis of treatment;Precautions;Next steps;Expected outcome  Times per Week: 1-2  Number of Weeks: 4  Number of Visits: 4-6  Neuromuscular Reeducation: vestibular      Plan for next visit: If sx persist, recheck  positional testing and complete CRT as appropriate.     Subjective:         History of Present Illness:    Lauren Kaplan) is a 69 y.o. female who presents to therapy today with complaints of lightheadedness and dizziness.  Onset: About 5 days ago. Does feel to be getting better.  Duration: Originally lasted the full day after she looked down, the first day, but now seems to be coming coming and going, hardly noticing at all now.  Worse with looking down, bending over  Better with (time) primarily she notes    Denies meclizine use.    Intensity of symptoms:  Currently: 2/10  At best: 2/10  At worst: 5/10       Objective:      Note: Items left blank indicates the item was not performed or not indicated at the time of the evaluation.    Patient Outcome Measures :    Other Test Score: DHI: 8%       Vestibular Disorder Examination  1. BPPV (benign paroxysmal positional vertigo), right         Gait Observation: WNL without obvious deviations  4m walk test: N/T  4m walk test with head turns: N/T  (Normal = <= 0.10-0.15 difference)  FGA: N/T    ROM:  Mild loss t/o    Strength: WNL    Sensation: Normal    Test of Provocation: N/T    Oculomotor Assessment (performed in room light):  Spontaneous Nystagmus: none  Gaze-holding Nystagmus: none  Smooth Pursuit: WNL  Saccades: WNL  Convergences: N/T  Cover/Uncover: WNL  VOR screen: WNL  VOR Cancellation: WNL  Head Impulse Test: Negative bilat  Dynamic Visual Acuity (DVA): N/T  Pressure/Tragal Test: N/T    Positional Tests:  Hallpike Right:  Reports positive reproduction of sx. No latency, lasting about 45 seconds  Hallpike Left:  Reports mild reproduction of sx. No latency, lasting about 30 seconds with significantly decreased intensity compared to the R  Supine Head Center:  Negative  Head Roll Right: Negative  Head Roll Left:  Negative    *no visible nystagmus noted with positional testing in room light    Balance Assessment:  Not Assessed      Treatment Today     TREATMENT  MINUTES COMMENTS   Evaluation 20    Self-care/ Home management     Manual therapy     Neuromuscular Re-education     Therapeutic Activity     Therapeutic Exercises     Gait training     Modality__________________     CRT 25 Epley CRT performed x2 reps for presumably R PC BPPV.         Total 45    Blank areas are intentional and mean the treatment did not include these items.              PT Evaluation Code: (Please list factors)  Patient History/Comorbidities: none  Examination: dizziness  Clinical Presentation: Stable  Clinical Decision Making: Low    Patient History/  Comorbidities Examination  (body structures and functions, activity limitations, and/or participation restrictions) Clinical Presentation Clinical Decision Making (Complexity)   No documented Comorbidities or personal factors 1-2 Elements Stable and/or uncomplicated Low   1-2 documented comorbidities or personal factor 3 Elements Evolving clinical presentation with changing characteristics Moderate   3-4 documented comorbidities or personal factors 4 or more Unstable and unpredictable High           Magno Martínez  6/2/2020  9:57 AM

## 2021-07-03 NOTE — ADDENDUM NOTE
Addendum Note by Giovanna Nash MD at 12/4/2018  2:40 PM     Author: Giovanna Nash MD Service: -- Author Type: Physician    Filed: 12/6/2018  4:02 PM Encounter Date: 12/4/2018 Status: Signed    : Giovanna Nash MD (Physician)    Addended by: GIOVANNA NASH on: 12/6/2018 04:02 PM        Modules accepted: Orders

## 2021-07-13 ENCOUNTER — RECORDS - HEALTHEAST (OUTPATIENT)
Dept: ADMINISTRATIVE | Facility: CLINIC | Age: 71
End: 2021-07-13

## 2021-08-03 PROBLEM — D75.839 THROMBOCYTOSIS: Status: RESOLVED | Noted: 2017-02-12 | Resolved: 2019-04-23

## 2021-08-03 PROBLEM — E43 SEVERE MALNUTRITION (H): Status: RESOLVED | Noted: 2017-02-12 | Resolved: 2019-04-23

## 2021-08-04 ENCOUNTER — OFFICE VISIT (OUTPATIENT)
Dept: FAMILY MEDICINE | Facility: CLINIC | Age: 71
End: 2021-08-04
Payer: COMMERCIAL

## 2021-08-04 VITALS
DIASTOLIC BLOOD PRESSURE: 60 MMHG | BODY MASS INDEX: 26.57 KG/M2 | RESPIRATION RATE: 16 BRPM | HEIGHT: 61 IN | SYSTOLIC BLOOD PRESSURE: 102 MMHG | HEART RATE: 77 BPM

## 2021-08-04 DIAGNOSIS — K51.919 ULCERATIVE COLITIS WITH COMPLICATION, UNSPECIFIED LOCATION (H): ICD-10-CM

## 2021-08-04 DIAGNOSIS — M54.16 LEFT LUMBAR RADICULOPATHY: Primary | ICD-10-CM

## 2021-08-04 DIAGNOSIS — M81.8 OTHER OSTEOPOROSIS WITHOUT CURRENT PATHOLOGICAL FRACTURE: ICD-10-CM

## 2021-08-04 DIAGNOSIS — J84.9 ILD (INTERSTITIAL LUNG DISEASE) (H): ICD-10-CM

## 2021-08-04 PROCEDURE — 99214 OFFICE O/P EST MOD 30 MIN: CPT | Performed by: FAMILY MEDICINE

## 2021-08-04 RX ORDER — ALENDRONATE SODIUM 70 MG/1
TABLET ORAL
COMMUNITY
Start: 2021-06-17 | End: 2022-06-09
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
## 2021-09-25 ENCOUNTER — HEALTH MAINTENANCE LETTER (OUTPATIENT)
Age: 71
End: 2021-09-25

## 2022-01-13 ENCOUNTER — OFFICE VISIT (OUTPATIENT)
Dept: FAMILY MEDICINE | Facility: CLINIC | Age: 72
End: 2022-01-13
Payer: COMMERCIAL

## 2022-01-13 VITALS
DIASTOLIC BLOOD PRESSURE: 82 MMHG | WEIGHT: 146.4 LBS | HEART RATE: 68 BPM | OXYGEN SATURATION: 96 % | BODY MASS INDEX: 27.64 KG/M2 | RESPIRATION RATE: 16 BRPM | HEIGHT: 61 IN | SYSTOLIC BLOOD PRESSURE: 123 MMHG | TEMPERATURE: 97.8 F

## 2022-01-13 DIAGNOSIS — R05.9 COUGH: Primary | ICD-10-CM

## 2022-01-13 DIAGNOSIS — K51.919 ULCERATIVE COLITIS WITH COMPLICATION, UNSPECIFIED LOCATION (H): ICD-10-CM

## 2022-01-13 DIAGNOSIS — J47.1 BRONCHIECTASIS WITH ACUTE EXACERBATION (H): ICD-10-CM

## 2022-01-13 PROCEDURE — U0003 INFECTIOUS AGENT DETECTION BY NUCLEIC ACID (DNA OR RNA); SEVERE ACUTE RESPIRATORY SYNDROME CORONAVIRUS 2 (SARS-COV-2) (CORONAVIRUS DISEASE [COVID-19]), AMPLIFIED PROBE TECHNIQUE, MAKING USE OF HIGH THROUGHPUT TECHNOLOGIES AS DESCRIBED BY CMS-2020-01-R: HCPCS | Performed by: FAMILY MEDICINE

## 2022-01-13 PROCEDURE — 99214 OFFICE O/P EST MOD 30 MIN: CPT | Performed by: FAMILY MEDICINE

## 2022-01-13 RX ORDER — AZITHROMYCIN 250 MG/1
TABLET, FILM COATED ORAL
Qty: 6 TABLET | Refills: 0 | Status: SHIPPED | OUTPATIENT
Start: 2022-01-13 | End: 2022-01-18

## 2022-01-13 ASSESSMENT — MIFFLIN-ST. JEOR: SCORE: 1111.45

## 2022-01-13 NOTE — PROGRESS NOTES
Assessment/ Plan     1. Cough  Lauren has had a cough now for approximately 10 days.  Her lung exam is fairly normal today.  However, she has a history of aspiration pneumonia and some bronchiectasis.  Therefore aware on the sign of caution and treat her with azithromycin.  We will check a COVID test today but she is outside the quarantine window.  - azithromycin (ZITHROMAX) 250 MG tablet; Take 2 tablets (500 mg) by mouth daily for 1 day, THEN 1 tablet (250 mg) daily for 4 days.  Dispense: 6 tablet; Refill: 0  - Symptomatic; Yes; 1/3/2022 COVID-19 Virus (Coronavirus) by PCR; Future  - Symptomatic; Yes; 1/3/2022 COVID-19 Virus (Coronavirus) by PCR Nose    2. Bronchiectasis with acute exacerbation (H)  - azithromycin (ZITHROMAX) 250 MG tablet; Take 2 tablets (500 mg) by mouth daily for 1 day, THEN 1 tablet (250 mg) daily for 4 days.  Dispense: 6 tablet; Refill: 0  - Symptomatic; Yes; 1/3/2022 COVID-19 Virus (Coronavirus) by PCR; Future  - Symptomatic; Yes; 1/3/2022 COVID-19 Virus (Coronavirus) by PCR Nose    3. Ulcerative colitis with complication, unspecified location (H) - s/p colectomy  She has a history of ulcerative colitis, but now has a colectomy.  She is no longer on immune suppressive medication      Subjective:      Lauren Dhaliwal is a 71 year old female who presents for evaluation of a cough.  She states she got her third COVID booster on January 3.  The day she started to have some symptoms.  She has had a congestion and cough.  She had maybe a low-grade fever for couple of days.  She denies myalgias.  She has not lost sense of smell or taste.  She does have a history of bronchiectasis and had a unusual aspiration type pneumonia a few years ago and ended up seeing pulmonary.  She feels like she might have pneumonia.  She was at a large gathering at [place] and may have had some exposures to COVID that she does not know of.  She is not feeling overall short of breath.    Relevant past medical, family,  "surgical, and social history reviewed with patient, unless noted in HPI, not pertinent for this visit.  Medications were discussed and reconciled.   Review of Systems   A 12 point comprehensive review of systems was negative except as noted.      Current Outpatient Medications   Medication Sig Dispense Refill     alendronate (FOSAMAX) 70 MG tablet TAKE 1 TABLET BY MOUTH ONCE A WEEK IN THE MORNING WITH A FULL GLASS OF WATER 30 MINUTES BEFORE THE FIRST MEAL BEVERAGE OR MEDICATION OF THE       azithromycin (ZITHROMAX) 250 MG tablet Take 2 tablets (500 mg) by mouth daily for 1 day, THEN 1 tablet (250 mg) daily for 4 days. 6 tablet 0     Multiple Vitamins-Minerals (ICAPS AREDS 2 PO)        Respiratory Therapy Supplies (NEBULIZER) IZABEL Uses normal saline due to side effect from Remicade           Objective:     /82   Pulse 68   Temp 97.8  F (36.6  C)   Resp 16   Ht 1.549 m (5' 1\")   Wt 66.4 kg (146 lb 6.4 oz)   LMP  (LMP Unknown)   SpO2 96%   BMI 27.66 kg/m      Body mass index is 27.66 kg/m .       General appearance: alert, appears stated age and cooperative  Head: Normocephalic, without obvious abnormality, atraumatic  Eyes: conjunctivae/corneas clear.   Ears: normal TM's and external ear canals both ears  Nose: Nares normal. Septum midline. Mucosa normal. No drainage or sinus tenderness.  Throat: lips, mucosa, and tongue normal; teeth and gums normal  Neck: no adenopathy  Lungs: clear to auscultation bilaterally  Heart: regular rate and rhythm, S1, S2 normal, no murmur, click, rub or gallop      No results found for this or any previous visit (from the past 168 hour(s)).       This note has been dictated using voice recognition software. Any grammatical or context distortions are unintentional and inherent to the software  "

## 2022-01-14 LAB
SARS-COV-2 RNA RESP QL NAA+PROBE: NORMAL
SARS-COV-2 RNA RESP QL NAA+PROBE: NOT DETECTED

## 2022-04-11 ENCOUNTER — ANCILLARY PROCEDURE (OUTPATIENT)
Dept: MAMMOGRAPHY | Facility: CLINIC | Age: 72
End: 2022-04-11
Attending: FAMILY MEDICINE
Payer: COMMERCIAL

## 2022-04-11 DIAGNOSIS — Z12.31 VISIT FOR SCREENING MAMMOGRAM: ICD-10-CM

## 2022-04-11 PROCEDURE — 77067 SCR MAMMO BI INCL CAD: CPT | Mod: TC | Performed by: RADIOLOGY

## 2022-04-26 ENCOUNTER — TRANSFERRED RECORDS (OUTPATIENT)
Dept: HEALTH INFORMATION MANAGEMENT | Facility: CLINIC | Age: 72
End: 2022-04-26
Payer: COMMERCIAL

## 2022-05-07 ENCOUNTER — HEALTH MAINTENANCE LETTER (OUTPATIENT)
Age: 72
End: 2022-05-07

## 2022-06-23 ENCOUNTER — OFFICE VISIT (OUTPATIENT)
Dept: FAMILY MEDICINE | Facility: CLINIC | Age: 72
End: 2022-06-23
Payer: COMMERCIAL

## 2022-06-23 VITALS
DIASTOLIC BLOOD PRESSURE: 72 MMHG | RESPIRATION RATE: 16 BRPM | SYSTOLIC BLOOD PRESSURE: 123 MMHG | BODY MASS INDEX: 25.95 KG/M2 | HEIGHT: 62 IN | HEART RATE: 69 BPM | WEIGHT: 141 LBS

## 2022-06-23 DIAGNOSIS — Z13.220 LIPID SCREENING: ICD-10-CM

## 2022-06-23 DIAGNOSIS — Z00.00 ENCOUNTER FOR MEDICARE ANNUAL WELLNESS EXAM: Primary | ICD-10-CM

## 2022-06-23 DIAGNOSIS — R53.82 CHRONIC FATIGUE: ICD-10-CM

## 2022-06-23 DIAGNOSIS — Z23 IMMUNIZATION DUE: ICD-10-CM

## 2022-06-23 DIAGNOSIS — K51.919 ULCERATIVE COLITIS WITH COMPLICATION, UNSPECIFIED LOCATION (H): ICD-10-CM

## 2022-06-23 DIAGNOSIS — M81.8 OTHER OSTEOPOROSIS WITHOUT CURRENT PATHOLOGICAL FRACTURE: ICD-10-CM

## 2022-06-23 DIAGNOSIS — J47.9 BRONCHIECTASIS WITHOUT COMPLICATION (H): ICD-10-CM

## 2022-06-23 LAB
ALBUMIN SERPL-MCNC: 3.8 G/DL (ref 3.5–5)
ALP SERPL-CCNC: 54 U/L (ref 45–120)
ALT SERPL W P-5'-P-CCNC: 12 U/L (ref 0–45)
ANION GAP SERPL CALCULATED.3IONS-SCNC: 9 MMOL/L (ref 5–18)
AST SERPL W P-5'-P-CCNC: 19 U/L (ref 0–40)
BILIRUB SERPL-MCNC: 0.7 MG/DL (ref 0–1)
BUN SERPL-MCNC: 16 MG/DL (ref 8–28)
CALCIUM SERPL-MCNC: 9.4 MG/DL (ref 8.5–10.5)
CHLORIDE BLD-SCNC: 107 MMOL/L (ref 98–107)
CHOLEST SERPL-MCNC: 195 MG/DL
CO2 SERPL-SCNC: 24 MMOL/L (ref 22–31)
CREAT SERPL-MCNC: 0.81 MG/DL (ref 0.6–1.1)
ERYTHROCYTE [DISTWIDTH] IN BLOOD BY AUTOMATED COUNT: 14 % (ref 10–15)
FASTING STATUS PATIENT QL REPORTED: NORMAL
GFR SERPL CREATININE-BSD FRML MDRD: 77 ML/MIN/1.73M2
GLUCOSE BLD-MCNC: 86 MG/DL (ref 70–125)
HCT VFR BLD AUTO: 37.1 % (ref 35–47)
HDLC SERPL-MCNC: 65 MG/DL
HGB BLD-MCNC: 12.1 G/DL (ref 11.7–15.7)
LDLC SERPL CALC-MCNC: 115 MG/DL
MCH RBC QN AUTO: 29.3 PG (ref 26.5–33)
MCHC RBC AUTO-ENTMCNC: 32.6 G/DL (ref 31.5–36.5)
MCV RBC AUTO: 90 FL (ref 78–100)
PLATELET # BLD AUTO: 174 10E3/UL (ref 150–450)
POTASSIUM BLD-SCNC: 4.4 MMOL/L (ref 3.5–5)
PROT SERPL-MCNC: 6.9 G/DL (ref 6–8)
RBC # BLD AUTO: 4.13 10E6/UL (ref 3.8–5.2)
SODIUM SERPL-SCNC: 140 MMOL/L (ref 136–145)
TRIGL SERPL-MCNC: 76 MG/DL
TSH SERPL DL<=0.005 MIU/L-ACNC: 1.06 UIU/ML (ref 0.3–5)
WBC # BLD AUTO: 4.5 10E3/UL (ref 4–11)

## 2022-06-23 PROCEDURE — 99397 PER PM REEVAL EST PAT 65+ YR: CPT | Mod: 25 | Performed by: FAMILY MEDICINE

## 2022-06-23 PROCEDURE — 80061 LIPID PANEL: CPT | Performed by: FAMILY MEDICINE

## 2022-06-23 PROCEDURE — 80053 COMPREHEN METABOLIC PANEL: CPT | Performed by: FAMILY MEDICINE

## 2022-06-23 PROCEDURE — 99213 OFFICE O/P EST LOW 20 MIN: CPT | Mod: 25 | Performed by: FAMILY MEDICINE

## 2022-06-23 PROCEDURE — 84443 ASSAY THYROID STIM HORMONE: CPT | Performed by: FAMILY MEDICINE

## 2022-06-23 PROCEDURE — 85027 COMPLETE CBC AUTOMATED: CPT | Mod: GZ | Performed by: FAMILY MEDICINE

## 2022-06-23 PROCEDURE — 36415 COLL VENOUS BLD VENIPUNCTURE: CPT | Performed by: FAMILY MEDICINE

## 2022-06-23 PROCEDURE — G0009 ADMIN PNEUMOCOCCAL VACCINE: HCPCS | Performed by: FAMILY MEDICINE

## 2022-06-23 PROCEDURE — 90677 PCV20 VACCINE IM: CPT | Performed by: FAMILY MEDICINE

## 2022-06-23 ASSESSMENT — ENCOUNTER SYMPTOMS
FEVER: 0
HEARTBURN: 0
PALPITATIONS: 0
COUGH: 1
NERVOUS/ANXIOUS: 1
HEMATURIA: 0
ARTHRALGIAS: 1
HEADACHES: 0
MYALGIAS: 0
SHORTNESS OF BREATH: 0
HEMATOCHEZIA: 0
EYE PAIN: 1
DIARRHEA: 0
PARESTHESIAS: 0
BREAST MASS: 0
WEAKNESS: 1
ABDOMINAL PAIN: 0
DIZZINESS: 0
SORE THROAT: 0
DYSURIA: 0
CHILLS: 1
JOINT SWELLING: 0
NAUSEA: 1
FREQUENCY: 0
CONSTIPATION: 0

## 2022-06-23 ASSESSMENT — PATIENT HEALTH QUESTIONNAIRE - PHQ9
10. IF YOU CHECKED OFF ANY PROBLEMS, HOW DIFFICULT HAVE THESE PROBLEMS MADE IT FOR YOU TO DO YOUR WORK, TAKE CARE OF THINGS AT HOME, OR GET ALONG WITH OTHER PEOPLE: SOMEWHAT DIFFICULT
SUM OF ALL RESPONSES TO PHQ QUESTIONS 1-9: 5
SUM OF ALL RESPONSES TO PHQ QUESTIONS 1-9: 5

## 2022-06-23 ASSESSMENT — ACTIVITIES OF DAILY LIVING (ADL): CURRENT_FUNCTION: NO ASSISTANCE NEEDED

## 2022-06-23 NOTE — PROGRESS NOTES
"SUBJECTIVE:   Lauren Dhaliwal is a 71 year old female who presents for Preventive Visit.      Patient has been advised of split billing requirements and indicates understanding: Yes  Are you in the first 12 months of your Medicare coverage?  No    Healthy Habits:     In general, how would you rate your overall health?  Good    Frequency of exercise:  None    Do you usually eat at least 4 servings of fruit and vegetables a day, include whole grains    & fiber and avoid regularly eating high fat or \"junk\" foods?  No    Taking medications regularly:  Yes    Ability to successfully perform activities of daily living:  No assistance needed    Home Safety:  Lack of handrails on stairs    Hearing Impairment:  Difficulty following a conversation in a noisy restaurant or crowded room and need to ask people to speak up or repeat themselves    In the past 6 months, have you been bothered by leaking of urine?  No    In general, how would you rate your overall mental or emotional health?  Good      PHQ-2 Total Score: 3    Do you feel safe in your environment? Yes     Lauren presents for her annual wellness exam.  Overall she is doing well.  Her main concern today is of some fatigue.  She does admit that maybe her mood is down a little bit, she does not feel like she is depressed or needs medication or therapy.  She just feels with the pandemic except has been a little bit down.  She continues to take Fosamax for her osteoporosis.  She had a really good response on her last bone density.  She is had a colectomy and no longer needs colonoscopies.  She has not had any lung issues going on this year.    Have you ever done Advance Care Planning? (For example, a Health Directive, POLST, or a discussion with a medical provider or your loved ones about your wishes): No, advance care planning information given to patient to review.  Advanced care planning was discussed at today's visit.       Fall risk  Fallen 2 or more times in the past " year?: No  Any fall with injury in the past year?: No    Cognitive Screening   1) Repeat 3 items (Leader, Season, Table)    2) Clock draw: NORMAL  3) 3 item recall: Recalls 2 objects   Results: NORMAL clock, 1-2 items recalled: COGNITIVE IMPAIRMENT LESS LIKELY    Mini-CogTM Copyright SANDRA Jonas. Licensed by the author for use in [facility]; reprinted with permission (ivette@[facility]). All rights reserved.      Do you have sleep apnea, excessive snoring or daytime drowsiness?: no    Reviewed and updated as needed this visit by clinical staff   Tobacco  Allergies  Meds                Reviewed and updated as needed this visit by Provider                   Social History     Tobacco Use     Smoking status: Never Smoker     Smokeless tobacco: Never Used   Substance Use Topics     Alcohol use: No         Alcohol Use 6/23/2022   Prescreen: >3 drinks/day or >7 drinks/week? Not Applicable               Current providers sharing in care for this patient include:   Patient Care Team:  Giovanna Parrish as PCP - General (Family Practice)  Giovanna Parrish as Assigned PCP    The following health maintenance items are reviewed in Epic and correct as of today:  Health Maintenance Due   Topic Date Due     ANNUAL REVIEW OF HM ORDERS  Never done     HEPATITIS C SCREENING  Never done     ZOSTER IMMUNIZATION (1 of 2) Never done     Lab work is in process          Review of Systems   Constitutional: Positive for chills. Negative for fever.   HENT: Positive for congestion. Negative for ear pain and sore throat.    Eyes: Positive for pain and visual disturbance.   Respiratory: Positive for cough. Negative for shortness of breath.    Cardiovascular: Negative for chest pain, palpitations and peripheral edema.   Gastrointestinal: Positive for nausea. Negative for abdominal pain, constipation, diarrhea, heartburn and hematochezia.   Breasts:  Negative for tenderness, breast mass and discharge.   Genitourinary: Negative for dysuria,  "frequency, genital sores, hematuria, pelvic pain, urgency, vaginal bleeding and vaginal discharge.   Musculoskeletal: Positive for arthralgias. Negative for joint swelling and myalgias.   Skin: Negative for rash.   Neurological: Positive for weakness. Negative for dizziness, headaches and paresthesias.   Psychiatric/Behavioral: Positive for mood changes. The patient is nervous/anxious.          OBJECTIVE:   /72   Pulse 69   Resp 16   Ht 1.562 m (5' 1.5\")   Wt 64 kg (141 lb)   LMP  (LMP Unknown)   BMI 26.21 kg/m   Estimated body mass index is 26.21 kg/m  as calculated from the following:    Height as of this encounter: 1.562 m (5' 1.5\").    Weight as of this encounter: 64 kg (141 lb).  Physical Exam  GENERAL APPEARANCE: healthy, alert and no distress  EYES: Eyes grossly normal to inspection, PERRL and conjunctivae and sclerae normal  HENT: ear canals and TM's normal, nose and mouth without ulcers or lesions, oropharynx clear and oral mucous membranes moist  NECK: no adenopathy, no asymmetry, masses, or scars and thyroid normal to palpation  RESP: lungs clear to auscultation - no rales, rhonchi or wheezes  BREAST: normal without masses, tenderness or nipple discharge and no palpable axillary masses or adenopathy, bilateral implants  CV: regular rate and rhythm, normal S1 S2, no S3 or S4, no murmur, click or rub, no peripheral edema and peripheral pulses strong  ABDOMEN: soft, nontender, no hepatosplenomegaly, no masses and bowel sounds normal, colectomy  MS: no musculoskeletal defects are noted and gait is age appropriate without ataxia  SKIN: no suspicious lesions or rashes  NEURO: Normal strength and tone, sensory exam grossly normal, mentation intact and speech normal  PSYCH: mentation appears normal and affect normal/bright    Diagnostic Test Results:  Labs reviewed in Epic  No results found for this or any previous visit (from the past 24 hour(s)).    ASSESSMENT / PLAN:   1. Encounter for Medicare " "annual wellness exam  Lauren presents for her annual wellness exam.  As far as healthcare maintenance, she has had a colectomy so no longer needs colonoscopies.  She will be due for her bone density next year.  She had a mammogram this year.  Recommend Prevnar 20    2. Other osteoporosis without current pathological fracture  She continues on the Fosamax.  She will be due for bone density next year.    3. Ulcerative colitis with complication, unspecified location (H) - s/p colectomy  Has had a colectomy, no longer needs colonoscopies.    4. Bronchiectasis without complication (H)  She has had no real lung issues this last year.    5. Immunization due  - PNEUMOCOCCAL 20 VALENT CONJUGATE (PREVNAR 20)    6. Chronic fatigue  She has had some fatigue so we will check some additional blood work.  - TSH with free T4 reflex; Future  - TSH with free T4 reflex    7. Lipid screening  She is due for lipid screening today.  - CBC with platelets; Future  - Comprehensive metabolic panel; Future  - Lipid panel reflex to direct LDL Fasting; Future  - CBC with platelets  - Comprehensive metabolic panel  - Lipid panel reflex to direct LDL Fasting      Patient has been advised of split billing requirements and indicates understanding: Yes    COUNSELING:  Reviewed preventive health counseling, as reflected in patient instructions       Regular exercise       Healthy diet/nutrition       Osteoporosis prevention/bone health    Estimated body mass index is 26.21 kg/m  as calculated from the following:    Height as of this encounter: 1.562 m (5' 1.5\").    Weight as of this encounter: 64 kg (141 lb).        She reports that she has never smoked. She has never used smokeless tobacco.      Appropriate preventive services were discussed with this patient, including applicable screening as appropriate for cardiovascular disease, diabetes, osteopenia/osteoporosis, and glaucoma.  As appropriate for age/gender, discussed screening for colorectal " cancer, prostate cancer, breast cancer, and cervical cancer. Checklist reviewing preventive services available has been given to the patient.    Reviewed patients plan of care and provided an AVS. The Basic Care Plan (routine screening as documented in Health Maintenance) for Lauren meets the Care Plan requirement. This Care Plan has been established and reviewed with the Patient.    Counseling Resources:  ATP IV Guidelines  Pooled Cohorts Equation Calculator  Breast Cancer Risk Calculator  Breast Cancer: Medication to Reduce Risk  FRAX Risk Assessment  ICSI Preventive Guidelines  Dietary Guidelines for Americans, 2010  [employer] MyPlate  ASA Prophylaxis  Lung CA Screening    Giovanna Parrish  [facility]    Identified Health Risks:  Answers for HPI/ROS submitted by the patient on 6/23/2022  If you checked off any problems, how difficult have these problems made it for you to do your work, take care of things at home, or get along with other people?: Somewhat difficult  PHQ9 TOTAL SCORE: 5

## 2022-06-23 NOTE — PROGRESS NOTES
She is at risk for lack of exercise and has been provided with information to increase physical activity for the benefit of her well-being.  The patient was counseled and encouraged to consider modifying their diet and eating habits. She was provided with information on recommended healthy diet options.  The patient was provided with written information regarding signs of hearing loss.  The patient's PHQ-9 score is consistent with mild depression.

## 2022-06-23 NOTE — PATIENT INSTRUCTIONS
Patient Education   Personalized Prevention Plan  You are due for the preventive services outlined below.  Your care team is available to assist you in scheduling these services.  If you have already completed any of these items, please share that information with your care team to update in your medical record.  Health Maintenance Due   Topic Date Due     ANNUAL REVIEW OF  ORDERS  Never done     Hepatitis C Screening  Never done     Zoster (Shingles) Vaccine (1 of 2) Never done       Exercise for a Healthier Heart  You may wonder how you can improve the health of your heart. If you re thinking about exercise, you re on the right track. You don t need to become an athlete. But you do need a certain amount of brisk exercise to help strengthen your heart. If you have been diagnosed with a heart condition, your healthcare provider may advise exercise to help stabilize your condition. To help make exercise a habit, choose safe, fun activities.      Exercise with a friend. When activity is fun, you're more likely to stick with it.   Before you start  Check with your healthcare provider before starting an exercise program. This is especially important if you have not been active for a while. It's also important if you have a long-term (chronic) health problem such as heart disease, diabetes, or obesity. Or if you are at high risk for having these problems.   Why exercise?  Exercising regularly offers many healthy rewards. It can help you do all of the following:     Improve your blood cholesterol level to help prevent further heart trouble    Lower your blood pressure to help prevent a stroke or heart attack    Control diabetes, or reduce your risk of getting this disease    Improve your heart and lung function    Reach and stay at a healthy weight    Make your muscles stronger so you can stay active    Prevent falls and fractures by slowing the loss of bone mass (osteoporosis)    Manage stress better    Reduce your  blood pressure    Improve your sense of self and your body image  Exercise tips      Ease into your routine. Set small goals. Then build on them. If you are not sure what your activity level should be, talk with your healthcare provider first before starting an exercise routine.    Exercise on most days. Aim for a total of 150 minutes (2 hours and 30 minutes) or more of moderate-intensity aerobic activity each week. Or 75 minutes (1 hour and 15 minutes) or more of vigorous-intensity aerobic activity each week. Or try for a combination of both. Moderate activity means that you breathe heavier and your heart rate increases but you can still talk. Think about doing 40 minutes of moderate exercise, 3 to 4 times a week. For best results, activity should last for about 40 minutes to lower blood pressure and cholesterol. It's OK to work up to the 40-minute period over time. Examples of moderate-intensity activity are walking 1 mile in 15 minutes. Or doing 30 to 45 minutes of yard work.    Step up your daily activity level.  Along with your exercise program, try being more active the whole day. Walk instead of drive. Or park further away so that you take more steps each day. Do more household tasks or yard work. You may not be able to meet the advised mount of physical activity. But doing some moderate- or vigorous-intensity aerobic activity can help reduce your risk for heart disease. Your healthcare provider can help you figure out what is best for you.    Choose 1 or more activities you enjoy.  Walking is one of the easiest things you can do. You can also try swimming, riding a bike, dancing, or taking an exercise class.    When to call your healthcare provider  Call your healthcare provider if you have any of these:     Chest pain or feel dizzy or lightheaded    Burning, tightness, pressure, or heaviness in your chest, neck, shoulders, back, or arms    Abnormal shortness of breath    More joint or muscle pain    A very  fast or irregular heartbeat (palpitations)  [employer] last reviewed this educational content on 7/1/2019 2000-2021 [employer]. All rights reserved. This information is not intended as a substitute for professional medical care. Always follow your healthcare professional's instructions.          Understanding USDA MyPlate  The USDA has guidelines to help you make healthy food choices. These are called MyPlate. MyPlate shows the food groups that make up healthy meals using the image of a place setting. Before you eat, think about the healthiest choices for what to put on your plate or in your cup or bowl. To learn more about building a healthy plate, visit www.choosemyplate.gov.    The food groups    Fruits. Any fruit or 100% fruit juice counts as part of the Fruit Group. Fruits may be fresh, canned, frozen, or dried, and may be whole, cut-up, or pureed. Make 1/2 of your plate fruits and vegetables.    Vegetables. Any vegetable or 100% vegetable juice counts as a member of the Vegetable Group. Vegetables may be fresh, frozen, canned, or dried. They can be served raw or cooked and may be whole, cut-up, or mashed. Make 1/2 of your plate fruits and vegetables.    Grains. All foods made from grains are part of the Grains Group. These include wheat, rice, oats, cornmeal, and barley. Grains are often used to make foods such as bread, pasta, oatmeal, cereal, tortillas, and grits. Grains should be no more than 1/4 of your plate. At least half of your grains should be whole grains.    Protein. This group includes meat, poultry, seafood, beans and peas, eggs, processed soy products (such as tofu), nuts (including nut butters), and seeds. Make protein choices no more than 1/4 of your plate. Meat and poultry choices should be lean or low fat.    Dairy. The Dairy Group includes all fluid milk products and foods made from milk that contain calcium, such as yogurt and cheese. (Foods that have little calcium, such as  cream, butter, and cream cheese, are not part of this group.) Most dairy choices should be low-fat or fat-free.    Oils. Oils aren't a food group, but they do contain essential nutrients. However it's important to watch your intake of oils. These are fats that are liquid at room temperature. They include canola, corn, olive, soybean, vegetable, and sunflower oil. Foods that are mainly oil include mayonnaise, certain salad dressings, and soft margarines. You likely already get your daily oil allowance from the foods you eat.  Things to limit  Eating healthy also means limiting these things in your diet:       Salt (sodium). Many processed foods have a lot of sodium. To keep sodium intake down, eat fresh vegetables, meats, poultry, and seafood when possible. Purchase low-sodium, reduced-sodium, or no-salt-added food products at the store. And don't add salt to your meals at home. Instead, season them with herbs and spices such as dill, oregano, cumin, and paprika. Or try adding flavor with lemon or lime zest and juice.    Saturated fat. Saturated fats are most often found in animal products such as beef, pork, and chicken. They are often solid at room temperature, such as butter. To reduce your saturated fat intake, choose leaner cuts of meat and poultry. And try healthier cooking methods such as grilling, broiling, roasting, or baking. For a simple lower-fat swap, use plain nonfat yogurt instead of mayonnaise when making potato salad or macaroni salad.    Added sugars. These are sugars added to foods. They are in foods such as ice cream, candy, soda, fruit drinks, sports drinks, energy drinks, cookies, pastries, jams, and syrups. Cut down on added sugars by sharing sweet treats with a family member or friend. You can also choose fruit for dessert, and drink water or other unsweetened beverages.     [employer] last reviewed this educational content on 6/1/2020 2000-2021 [employer]. All rights reserved.  This information is not intended as a substitute for professional medical care. Always follow your healthcare professional's instructions.          Signs of Hearing Loss      Hearing much better with one ear can be a sign of hearing loss.   Hearing loss is a problem shared by many people. In fact, it is one of the most common health problems, particularly as people age. Most people age 65 and older have some hearing loss. By age 80, almost everyone does. Hearing loss often occurs slowly over the years. So you may not realize your hearing has gotten worse.  Have your hearing checked  Call your healthcare provider if you:    Have to strain to hear normal conversation    Have to watch other people s faces very carefully to follow what they re saying    Need to ask people to repeat what they ve said    Often misunderstand what people are saying    Turn the volume of the television or radio up so high that others complain    Feel that people are mumbling when they re talking to you    Find that the effort to hear leaves you feeling tired and irritated    Notice, when using the phone, that you hear better with one ear than the other  [employer] last reviewed this educational content on 1/1/2020 2000-2021 [employer]. All rights reserved. This information is not intended as a substitute for professional medical care. Always follow your healthcare professional's instructions.          Depression and Suicide in Older Adults    Nearly 2 million older Americans have some type of depression. Some of them even take their own lives. Yet depression among older adults is often ignored. Learn the warning signs. You may help spare a loved one needless pain. You may also save a life.   What is depression?  Depression is a common and serious illness that affects the way you think and feel. It is not a normal part of aging, nor is it a sign of weakness, a character flaw, or something you can snap out of. Most people with  "depression need treatment to get better. The most common symptom is a feeling of deep sadness. People who are depressed also may seem tired and listless. And nothing seems to give them pleasure. It s normal to grieve or be sad sometimes. But sadness lessens or passes with time. Depression rarely goes away or improves on its own. A person with clinical depression can't \"snap out of it.\" Other symptoms of depression are:     Sleeping more or less than normal    Eating more or less than normal    Having headaches, stomachaches, or other pains that don t go away    Feeling nervous,  empty,  or worthless    Crying a great deal    Thinking or talking about suicide or death    Loss of interest in activities previously enjoyed    Social isolation    Feeling confused or forgetful  What causes it?  The causes of depression aren t fully known. But it is thought to result from a complex blend of these factors:     Biochemistry. Certain chemicals in the brain play a role.    Genes. Depression does run in families.    Life stress. Life stresses can also trigger depression in some people. Older adults often face many stressors, such as death of friends or a spouse, health problems, and financial concerns.    Chronic conditions. This includes conditions such as diabetes, heart disease, or cancer. These can cause symptoms of depression. Medicine side effects can cause changes in thoughts and behaviors.  How you can help  Often, depressed people may not want to ask for help. When they do, they may be ignored. Or, they may receive the wrong treatment. You can help by showing parents and older friends love and support. If they seem depressed, don t lecture the person, ignore the symptoms, or discount the symptoms as a  normal  part of aging -which they are not. Get involved, listen, and show interest and support.   Help them understand that depression is a treatable illness. Tell them you can help them find the right treatment. Offer " to go to their healthcare provider's appointment with them for support when the symptoms are discussed. With their approval, contact a local mental health center, social service agency, or hospital about services.   You can be an advocate for him or her at healthcare appointments. Many older adults have chronic illnesses that can cause symptoms of depression. Medicine side effects can change thoughts and behaviors. You can help make sure that the healthcare provider looks at all of these factors. He or she should refer your family member or friend to a mental healthcare provider when needed. in some cases, untreated depression can lead to a misdiagnosis. A person may be diagnosed with a brain disorder such as dementia. If the healthcare provider does not take the issue of depression seriously, help your family member or friend to find another provider.   Don't be afraid to ask  If you think an older person you care about could be suicidal, ask,  Have you thought about suicide?  Most people will tell you the truth. If they say  yes,  they may already have a plan for how and when they will attempt it. Find out as much as you can. The more detailed the plan, and the easier it is to carry out, the more danger the person is in right now. Tell the person you are there for them and do not want them to harm him or herself. Don't wait to get help for the person. Call the person's healthcare provider, local hospital, or emergency services.   To learn more    National Suicide Prevention Lifeline (crisis hotline) 454-906-WEMK (364-971-8619)    National [place] of Mental Kuwhxy974-908-7263sic.[facility] Hospital[facility].nih.gov    National [place] on Mental Hgmkejn420-523-8769trw.vida.org    Mental Health Irpyrkh372-627-3069fyw.[facility].org    National Suicide Ogtcleu597-UMSUHLQ (115-054-8016)    Call 695  Never leave the person alone. A person who is actively suicidal needs psychiatric care right away. They will need constant supervision. Never leave  the person out of sight. Call 911 or the national 24-hour suicide crisis hotline at 394-943-FGLV (808-470-1054). You can also take the person to the closest emergency room.   [employer] last reviewed this educational content on 5/1/2020 2000-2021 [employer]. All rights reserved. This information is not intended as a substitute for professional medical care. Always follow your healthcare professional's instructions.

## 2022-07-04 ENCOUNTER — HOSPITAL ENCOUNTER (EMERGENCY)
Facility: CLINIC | Age: 72
Discharge: HOME | End: 2022-07-04
Attending: PHYSICIAN ASSISTANT | Admitting: PHYSICIAN ASSISTANT
Payer: COMMERCIAL

## 2022-07-04 VITALS
DIASTOLIC BLOOD PRESSURE: 71 MMHG | OXYGEN SATURATION: 98 % | TEMPERATURE: 98.4 F | WEIGHT: 141 LBS | SYSTOLIC BLOOD PRESSURE: 114 MMHG | RESPIRATION RATE: 18 BRPM | HEART RATE: 77 BPM | BODY MASS INDEX: 26.21 KG/M2
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
## 2022-07-04 PROCEDURE — 99213 OFFICE O/P EST LOW 20 MIN: CPT | Performed by: PHYSICIAN ASSISTANT

## 2022-07-04 PROCEDURE — G0463 HOSPITAL OUTPT CLINIC VISIT: HCPCS | Performed by: PHYSICIAN ASSISTANT

## 2022-07-04 RX ORDER — DOXYCYCLINE HYCLATE 100 MG
200 TABLET ORAL ONCE
Qty: 2 TABLET | Refills: 0 | Status: SHIPPED | OUTPATIENT
Start: 2022-07-04 | End: 2022-07-04

## 2022-07-04 NOTE — ED PROVIDER NOTES
History     Chief Complaint   Patient presents with     Insect Bite     Tick bite a few days ago     HPI  Lauren Dhaliwal is a 71 year old female who presents to urgent care with concern over deer tick she removed from behind her right ear yesterday.  She is unsure how long tick had been present for however believes it could have been up to 3 days as she had had some pruritus in the area.  Due to location she is unaware that there is any erythema, worrisome rashes or skin changes.  She has not had any recent fever, chills, allergies, cough, chest pain, dyspnea, wheezing, new onset abdominal symptoms or joint pains.  She is requesting prescription for antibiotic like she received last within the last two months due to concerns over multiple friends who have been diagnosed with lyme disease.      Allergies:  Allergies   Allergen Reactions     Pcn [Penicillins]      Albuterol Palpitations     Clindamycin Rash     Enoxaparin Itching and Rash     Heparin Itching and Rash     Infliximab Itching and Rash     Metronidazole Rash     Problem List:    Patient Active Problem List    Diagnosis Date Noted     Bronchiectasis (H) 03/03/2020     Priority: Medium     Normocytic anemia 02/12/2017     Priority: Medium     Ulcerative colitis with complication, unspecified location (H) - s/p colectomy 10/25/2016     Priority: Medium     Other osteoporosis without current pathological fracture 06/01/2015     Priority: Medium      Past Medical History:    Past Medical History:   Diagnosis Date     Gallstones      Ulcerative (chronic) enterocolitis (H)      Past Surgical History:    Past Surgical History:   Procedure Laterality Date     ABDOMINOPERINEAL PROCTOCOLECTOMY N/A 2/13/2017    Procedure: LAPAROSCOPIC ASSISTED TOTAL PROCTOCOLECTOMY WITH ILEOSTOMY, AND PROCYOSCOPY;  Surgeon: Krishna Christensen MD;  [place] [facility] [place]  Service:      MAMMOPLASTY AUGMENTATION  1979     OTHER SURGICAL HISTORY      [id number] with  colostomy     PICC  2/13/2017          TONSILLECTOMY         Family History:    Family History   Problem Relation Age of Onset     Colon Cancer Mother      Breast Cancer No family hx of        Social History:  Marital Status:   [2]  Social History     Tobacco Use     Smoking status: Never Smoker     Smokeless tobacco: Never Used   Substance Use Topics     Alcohol use: No     Drug use: No        Medications:    doxycycline hyclate (VIBRA-TABS) 100 MG tablet  alendronate (FOSAMAX) 70 MG tablet  Multiple Vitamins-Minerals (ICAPS AREDS 2 PO)  Respiratory Therapy Supplies (NEBULIZER) IZABEL          Review of Systems   Constitutional: Negative for chills and fever.   Respiratory: Negative for cough, shortness of breath and wheezing.    Cardiovascular: Negative for chest pain.   Skin: Negative for rash and wound.     Physical Exam   BP: 114/71  Pulse: 77  Temp: 98.4  F (36.9  C)  Resp: 18  Weight: 64 kg (141 lb)  SpO2: 98 %  Physical Exam  GENERAL APPEARANCE: healthy, alert and no distress  EYES: EOMI,  PERRL, conjunctiva clear  HENT: ear canals and TM's normal. Single punctate lesion on scalp posterior to right ear.   Nose and mouth without ulcers, erythema or lesions  NECK: supple, nontender, no lymphadenopathy  RESP: lungs clear to auscultation - no rales, rhonchi or wheezes  CV: regular rates and rhythm, normal S1 S2, no murmur noted  SKIN: no suspicious lesions or rashes on exposed areas of skin other single punctate lesion posterior to right ear   ED Course           Procedures         Critical Care time:  none               No results found for this or any previous visit (from the past 24 hour(s)).    Medications - No data to display    Assessments & Plan (with Medical Decision Making)     I have reviewed the nursing notes.    I have reviewed the findings, diagnosis, plan and need for follow up with the patient.       New Prescriptions    DOXYCYCLINE HYCLATE (VIBRA-TABS) 100 MG TABLET    Take 2 tablets (200  mg) by mouth once for 1 dose       Final diagnoses:   Tick bite     71-year-old female presents the urgent care with concern over tick bite to the right side of her scalp posterior to her right ear which she believes has been present for the last 3 days.  Patient does bring in deer tick.  There was no concerning skin changes to suggest erythema migrans, cellulitis or developing abscess at this time.  I did discuss options following deer tick bite including watchful waiting, single dose of doxycycline or full course of antibiotics and patient elected to take single dose of doxycycline.  Patient was discharged home stable. Signs/symptoms of lyme disease/tickbrone illness, worrisome reasons to return to ER/UC discussed. Follow up as needed.      Disclaimer: This note consists of symbols derived from keyboarding, dictation, and/or voice recognition software. As a result, there may be errors in the script that have gone undetected.  Please consider this when interpreting information found in the chart.    7/4/2022   [facility] EMERGENCY DEPT     Linda Ramirez PA-C  07/06/22 [id number]

## 2022-07-04 NOTE — ED TRIAGE NOTES
Triage Assessment     Row Name 07/04/22 1314       Triage Assessment (Adult)    Airway WDL WDL       Respiratory WDL    Respiratory WDL WDL       Skin Circulation/Temperature WDL    Skin Circulation/Temperature WDL WDL       Cardiac WDL    Cardiac WDL WDL       Peripheral/Neurovascular WDL    Peripheral Neurovascular WDL WDL

## 2022-07-06 ASSESSMENT — ENCOUNTER SYMPTOMS
COUGH: 0
FEVER: 0
SHORTNESS OF BREATH: 0
WOUND: 0
CHILLS: 0
WHEEZING: 0

## 2022-09-22 ENCOUNTER — MYC MEDICAL ADVICE (OUTPATIENT)
Dept: FAMILY MEDICINE | Facility: CLINIC | Age: 72
End: 2022-09-22

## 2022-09-22 DIAGNOSIS — Z90.49 STATUS POST COLON RESECTION: Primary | ICD-10-CM

## 2022-09-23 NOTE — TELEPHONE ENCOUNTER
Printed Rx renewal form, placed in Dr. Parrish's inbox for sig    Jesus Washburn RN     [facility]

## 2022-10-04 ENCOUNTER — TRANSFERRED RECORDS (OUTPATIENT)
Dept: HEALTH INFORMATION MANAGEMENT | Facility: CLINIC | Age: 72
End: 2022-10-04

## 2022-12-27 ENCOUNTER — TELEPHONE (OUTPATIENT)
Dept: FAMILY MEDICINE | Facility: CLINIC | Age: 72
End: 2022-12-27
Payer: COMMERCIAL

## 2022-12-27 DIAGNOSIS — Z93.3 COLOSTOMY STATUS (H): Primary | ICD-10-CM

## 2022-12-27 NOTE — TELEPHONE ENCOUNTER
[facility] called, pt uses them for ostomy supplies. Insurance is denying the claim with current diagnosis code. The diagnosis code insurance will accept is Z93.3. Please fax new order to . Jing Alicia RN

## 2023-01-31 ENCOUNTER — TRANSFERRED RECORDS (OUTPATIENT)
Dept: HEALTH INFORMATION MANAGEMENT | Facility: CLINIC | Age: 73
End: 2023-01-31

## 2023-04-12 ENCOUNTER — ANCILLARY PROCEDURE (OUTPATIENT)
Dept: MAMMOGRAPHY | Facility: CLINIC | Age: 73
End: 2023-04-12
Attending: FAMILY MEDICINE
Payer: COMMERCIAL

## 2023-04-12 ENCOUNTER — ANCILLARY ORDERS (OUTPATIENT)
Dept: MAMMOGRAPHY | Facility: CLINIC | Age: 73
End: 2023-04-12

## 2023-04-12 DIAGNOSIS — Z12.31 VISIT FOR SCREENING MAMMOGRAM: ICD-10-CM

## 2023-04-12 PROCEDURE — 77067 SCR MAMMO BI INCL CAD: CPT | Mod: TC | Performed by: RADIOLOGY

## 2023-04-22 ENCOUNTER — HEALTH MAINTENANCE LETTER (OUTPATIENT)
Age: 73
End: 2023-04-22

## 2023-04-26 ENCOUNTER — OFFICE VISIT (OUTPATIENT)
Dept: FAMILY MEDICINE | Facility: CLINIC | Age: 73
End: 2023-04-26
Payer: COMMERCIAL

## 2023-04-26 VITALS
HEIGHT: 62 IN | BODY MASS INDEX: 25.43 KG/M2 | OXYGEN SATURATION: 98 % | WEIGHT: 138.2 LBS | DIASTOLIC BLOOD PRESSURE: 70 MMHG | HEART RATE: 63 BPM | RESPIRATION RATE: 16 BRPM | SYSTOLIC BLOOD PRESSURE: 111 MMHG | TEMPERATURE: 97.9 F

## 2023-04-26 DIAGNOSIS — H25.9 AGE-RELATED CATARACT OF BOTH EYES, UNSPECIFIED AGE-RELATED CATARACT TYPE: ICD-10-CM

## 2023-04-26 DIAGNOSIS — J47.9 BRONCHIECTASIS WITHOUT COMPLICATION (H): ICD-10-CM

## 2023-04-26 DIAGNOSIS — K51.919 ULCERATIVE COLITIS WITH COMPLICATION, UNSPECIFIED LOCATION (H): ICD-10-CM

## 2023-04-26 DIAGNOSIS — Z01.818 PREOP GENERAL PHYSICAL EXAM: Primary | ICD-10-CM

## 2023-04-26 PROCEDURE — 99214 OFFICE O/P EST MOD 30 MIN: CPT | Performed by: FAMILY MEDICINE

## 2023-04-26 NOTE — PROGRESS NOTES
[facility]  [address] [facility] [address]  Phone: [phone]  Fax: [phone]  Primary Provider: Giovanna Nash  Pre-op Performing Provider: GIOVANNA NASH      PREOPERATIVE EVALUATION:  Today's date: 4/26/2023    Lauren Dhaliwal is a 72 year old female who presents for a preoperative evaluation.      4/26/2023     8:37 AM   Additional Questions   Roomed by      Surgical Information:  Surgery/Procedure: Cataract  Surgery Location: [facility]  Surgeon: Dr. Mariscal  Surgery Date: 5/9/2023  Time of Surgery: TBD  Where patient plans to recover: At home with family  Fax number for surgical facility: [phone]    1. Preop general physical exam  Lauren is approved for bilateral cataract procedure.  As this is a low risk procedure, blood work and EKG were not warranted today.    2. Age-related cataract of both eyes, unspecified age-related cataract type    3. Bronchiectasis without complication (H)  Stable, has somewhat of a chronic cough.    4. Ulcerative colitis with complication, unspecified location (H) - s/p colectomy  Status post colectomy      Subjective     HPI related to upcoming procedure: Lauren presents for a preop exam.  She is having both cataracts done.  She feels like she is just getting over somewhat of a viral gastroenteritis.  She is feeling better today and she does not have the procedure now for at least another week so hopefully she will completely recover.  She has no known coronary artery disease and denies chest pain or shortness of breath.  She has a history of bronchiectasis with just a little bit of a chronic cough at baseline.  This is better if she does an albuterol nebulizer.  She had pretty severe ulcerative colitis in the past and ended up having a total colectomy.  Her only prescription medication is Fosamax.        4/25/2023     9:26 PM   Preop Questions   1. Have you ever had a heart attack or stroke? No   2.  Have you ever had surgery on your heart or blood vessels, such as a stent placement, a coronary artery bypass, or surgery on an artery in your head, neck, heart, or legs? No   3. Do you have chest pain with activity? No   4. Do you have a history of  heart failure? No   5. Do you currently have a cold, bronchitis or symptoms of other infection?   Recent viral gastroenteritis   6. Do you have a cough, shortness of breath, or wheezing? YES -baseline, stable   7. Do you or anyone in your family have previous history of blood clots? No   8. Do you or does anyone in your family have a serious bleeding problem such as prolonged bleeding following surgeries or cuts? No   9. Have you ever had problems with anemia or been told to take iron pills? No   10. Have you had any abnormal blood loss such as black, tarry or bloody stools, or abnormal vaginal bleeding? No   11. Have you ever had a blood transfusion? YES -before diagnosed with ulcerative colitis   11a. Have you ever had a transfusion reaction? No   12. Are you willing to have a blood transfusion if it is medically needed before, during, or after your surgery? Yes   13. Have you or any of your relatives ever had problems with anesthesia? YES -father many years ago with ether   14. Do you have sleep apnea, excessive snoring or daytime drowsiness? no   15. Do you have any artifical heart valves or other implanted medical devices like a pacemaker, defibrillator, or continuous glucose monitor? No   16. Do you have artificial joints? No   17. Are you allergic to latex? No       Health Care Directive:  Patient does not have a Health Care Directive or Living Will: Patient states has Advance Directive and will bring in a copy to clinic.    Preoperative Review of :   reviewed - no record of controlled substances prescribed.      Status of Chronic Conditions:  See problem list for active medical problems.  Problems all longstanding and stable, except as noted/documented.   See ROS for pertinent symptoms related to these conditions.      Review of Systems  CONSTITUTIONAL: NEGATIVE for fever, chills, change in weight  INTEGUMENTARY/SKIN: NEGATIVE for worrisome rashes, moles or lesions  EYES: NEGATIVE for vision changes or irritation  ENT/MOUTH: NEGATIVE for ear, mouth and throat problems  RESP: NEGATIVE for significant cough or SOB  CV: NEGATIVE for chest pain, palpitations or peripheral edema  GI: NEGATIVE for nausea, abdominal pain, heartburn, or change in bowel habits  : NEGATIVE for frequency, dysuria, or hematuria  MUSCULOSKELETAL: NEGATIVE for significant arthralgias or myalgia  NEURO: NEGATIVE for weakness, dizziness or paresthesias  ENDOCRINE: NEGATIVE for temperature intolerance, skin/hair changes  HEME: NEGATIVE for bleeding problems  PSYCHIATRIC: NEGATIVE for changes in mood or affect    Patient Active Problem List    Diagnosis Date Noted     Bronchiectasis (H) 03/03/2020     Priority: Medium     Normocytic anemia 02/12/2017     Priority: Medium     Ulcerative colitis with complication, unspecified location (H) - s/p colectomy 10/25/2016     Priority: Medium     Other osteoporosis without current pathological fracture 06/01/2015     Priority: Medium      Past Medical History:   Diagnosis Date     Gallstones      Ulcerative (chronic) enterocolitis (H)      Past Surgical History:   Procedure Laterality Date     ABDOMINOPERINEAL PROCTOCOLECTOMY N/A 2/13/2017    Procedure: LAPAROSCOPIC ASSISTED TOTAL PROCTOCOLECTOMY WITH ILEOSTOMY, AND PROCYOSCOPY;  Surgeon: Krishna Christensen MD;  Location: [facility];  Service:      MAMMOPLASTY AUGMENTATION  1979     OTHER SURGICAL HISTORY      [id number] with colostomy     PICC  2/13/2017          TONSILLECTOMY       Current Outpatient Medications   Medication Sig Dispense Refill     alendronate (FOSAMAX) 70 MG tablet TAKE 1 TABLET BY MOUTH ONCE A WEEK IN THE MORNING WITH A FULL GLASS OF WATER 30 MINUTES BEFORE THE FIRST MEAL  "BEVERAGE OR MEDICATION OF THE 12 tablet 3     Multiple Vitamins-Minerals (ICAPS AREDS 2 PO)        Respiratory Therapy Supplies (NEBULIZER) IZABEL Uses normal saline due to side effect from Remicade         Allergies   Allergen Reactions     Pcn [Penicillins]      Albuterol Palpitations     Clindamycin Rash     Enoxaparin Itching and Rash     Heparin Itching and Rash     Infliximab Itching and Rash     Metronidazole Rash        Social History     Tobacco Use     Smoking status: Never     Smokeless tobacco: Never   Vaping Use     Vaping status: Not on file   Substance Use Topics     Alcohol use: No       History   Drug Use No         Objective     /70   Pulse 63   Temp 97.9  F (36.6  C)   Resp 16   Ht 1.562 m (5' 1.5\")   Wt 62.7 kg (138 lb 3.2 oz)   LMP  (LMP Unknown)   SpO2 98%   BMI 25.69 kg/m      Physical Exam    GENERAL APPEARANCE: healthy, alert and no distress     EYES: EOMI, PERRL     HENT: ear canals and TM's normal and nose and mouth without ulcers or lesions     NECK: no adenopathy, no asymmetry, masses, or scars and thyroid normal to palpation     RESP: lungs clear to auscultation - no rales, rhonchi or wheezes     CV: regular rates and rhythm, normal S1 S2, no S3 or S4 and no murmur, click or rub     ABDOMEN:  soft, nontender, no HSM or masses and bowel sounds normal     MS: extremities normal- no gross deformities noted, no evidence of inflammation in joints, FROM in all extremities.     SKIN: no suspicious lesions or rashes     NEURO: Normal strength and tone, sensory exam grossly normal, mentation intact and speech normal     PSYCH: mentation appears normal. and affect normal/bright     LYMPHATICS: No cervical adenopathy    Recent Labs   Lab Test 06/23/22  1454   HGB 12.1         POTASSIUM 4.4   CR 0.81        Diagnostics:  No labs were ordered during this visit.   No EKG required for low risk surgery (cataract, skin procedure, breast biopsy, etc).    Revised Cardiac Risk " Index (RCRI):  The patient has the following serious cardiovascular risks for perioperative complications:   - No serious cardiac risks = 0 points     RCRI Interpretation: 0 points: Class I (very low risk - 0.4% complication rate)           Signed Electronically by: Giovanna Parrish MD  Copy of this evaluation report is provided to requesting physician.

## 2023-05-15 ENCOUNTER — MYC MEDICAL ADVICE (OUTPATIENT)
Dept: FAMILY MEDICINE | Facility: CLINIC | Age: 73
End: 2023-05-15
Payer: COMMERCIAL

## 2023-05-15 DIAGNOSIS — M81.8 OTHER OSTEOPOROSIS WITHOUT CURRENT PATHOLOGICAL FRACTURE: ICD-10-CM

## 2023-05-15 NOTE — TELEPHONE ENCOUNTER
"Routing refill request to provider for review/approval because:  Early refill request    Last Written Prescription Date:  6/9/2022  Last Fill Quantity: 12,  # refills: 3   Last office visit provider:  4/26/2023     Requested Prescriptions   Pending Prescriptions Disp Refills     alendronate (FOSAMAX) 70 MG tablet 12 tablet 3     Sig: TAKE 1 TABLET BY MOUTH ONCE A WEEK IN THE MORNING WITH A FULL GLASS OF WATER 30 MINUTES BEFORE THE FIRST MEAL BEVERAGE OR MEDICATION OF THE       Bisphosphonates Passed - 5/15/2023  3:51 PM        Passed - Recent (12 mo) or future (30 days) visit within the authorizing provider's specialty     Patient has had an office visit with the authorizing provider or a provider within the authorizing providers department within the previous 12 mos or has a future within next 30 days. See \"Patient Info\" tab in inbasket, or \"Choose Columns\" in Meds & Orders section of the refill encounter.              Passed - Dexa on file within past 2 years     Please review last Dexa result.           Passed - Medication is active on med list        Passed - Patient is age 18 or older        Passed - Normal serum creatinine on file within past 12 months     Recent Labs   Lab Test 06/23/22  1454   CR 0.81       Ok to refill medication if creatinine is low               Silvia Mendoza RN 05/15/23 3:51 PM  "

## 2023-05-16 RX ORDER — ALENDRONATE SODIUM 70 MG/1
TABLET ORAL
Qty: 12 TABLET | Refills: 3 | Status: SHIPPED | OUTPATIENT
Start: 2023-05-16 | End: 2024-05-10

## 2023-05-24 ENCOUNTER — PATIENT OUTREACH (OUTPATIENT)
Dept: CARE COORDINATION | Facility: CLINIC | Age: 73
End: 2023-05-24
Payer: COMMERCIAL

## 2023-06-07 ENCOUNTER — NURSE TRIAGE (OUTPATIENT)
Dept: NURSING | Facility: CLINIC | Age: 73
End: 2023-06-07
Payer: COMMERCIAL

## 2023-06-08 ENCOUNTER — PATIENT OUTREACH (OUTPATIENT)
Dept: CARE COORDINATION | Facility: CLINIC | Age: 73
End: 2023-06-08

## 2023-06-08 NOTE — TELEPHONE ENCOUNTER
"Patient calling back confused about submitting E-visit. Informed patient that an E-visit is for documentation purposes so that PCP can prescribe abx for patient. Verbalizes understanding & will have  (\"tech-savvy\") assist with submitting E-visit.    Jarret Macedo RN, BSN  [facility]  "

## 2023-06-08 NOTE — TELEPHONE ENCOUNTER
I can send in the doxycycline.  It would be preferred if she is able to submit an E-visit for documentation.  If she absolutely feels she cannot do this, just send it back to me and I will send in the antibiotic.

## 2023-06-08 NOTE — TELEPHONE ENCOUNTER
Bitten by 2 tiny ticks, could barely get them out 15 min ago. She is sure they are deer ticks. The last time it happened she was given 2 antibiotic pills. She checks for ticks every day. When she removed them they were still flat. Bite area is red on right breast=the wound is the size of a 3 heads of a pin. .   The ticks were attached to her right breast, and just below collar bone. She is extremely worried about Lyme's disease: she has known friends who had Lyme's disease and they were quite ill.. She is requesting antibiotic like she has had before (Doxycycline).  Pharmacy: [facility].   Triaged to a disposition of Home Care. Given per guideline.     Message will be routed to provider. Please call patient with provider's response.    Bebe Taylor RN Triage Nurse Advisor 9:18 PM 6/7/2023     Reason for Disposition    Deer tick bite with no complications    Preventing tick bites and tick repellents (e.g., DEET), questions about    Additional Information    Negative: Sounds like a life-threatening emergency to the triager    Negative: Not a tick bite    Negative: [1] 2 to 14 days following tick bite AND [2] severe headache with fever occurs    Negative: [1] 2 to 14 days following tick bite AND [2] widespread rash with fever occurs    Negative: Patient sounds very sick or weak to the triager    Negative: [1] Fever AND [2] spreading red area or streak    Negative: [1] Fever AND [2] area is very tender to touch    Negative: [1] Red streak or red line AND [2] length > 2 inches (5 cm)    Negative: Can't remove live tick (after trying Care Advice)    Negative: [1] 2 to 14 days following tick bite AND [2] fever AND [3] no rash or headache    Negative: [1] 2 to 14 days following tick bite AND [2] widespread rash or headache AND [3] no fever    Negative: [1] Red or very tender (to touch) area AND [2] started over 24 hours after the bite    Negative: Red ring or bull's-eye rash occurs at tick bite    Negative: [1]  Probable deer tick AND [2] attached 36 hours or more (or tick appears swollen, not flat) AND [3] occurred in an area where Lyme disease is common    Negative: [1] Scab is present AND [2] it drains pus or increases in size AND [3] not improved after applying antibiotic ointment for 2 days    Negative: Wood tick bite with no complications    Protocols used: TICK BITE-A-AH

## 2023-06-08 NOTE — TELEPHONE ENCOUNTER
Patient also sent in [employer] message regarding concern. RN responded to patient's [employer] message with E-visit instructions. See Mychart encounter from 6/8.    Jarret Macedo RN, BSN  [facility]

## 2023-06-20 ENCOUNTER — TRANSFERRED RECORDS (OUTPATIENT)
Dept: HEALTH INFORMATION MANAGEMENT | Facility: CLINIC | Age: 73
End: 2023-06-20
Payer: COMMERCIAL

## 2023-08-04 ENCOUNTER — E-VISIT (OUTPATIENT)
Dept: FAMILY MEDICINE | Facility: CLINIC | Age: 73
End: 2023-08-04
Payer: COMMERCIAL
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
## 2023-08-04 PROCEDURE — 99421 OL DIG E/M SVC 5-10 MIN: CPT | Performed by: FAMILY MEDICINE

## 2023-08-05 ENCOUNTER — MYC MEDICAL ADVICE (OUTPATIENT)
Dept: FAMILY MEDICINE | Facility: CLINIC | Age: 73
End: 2023-08-05
Payer: COMMERCIAL

## 2023-08-05 DIAGNOSIS — Z93.3 COLOSTOMY STATUS (H): Primary | ICD-10-CM

## 2023-08-06 RX ORDER — DOXYCYCLINE HYCLATE 100 MG
100 TABLET ORAL 2 TIMES DAILY
Qty: 20 TABLET | Refills: 0 | Status: SHIPPED | OUTPATIENT
Start: 2023-08-06 | End: 2023-09-27

## 2023-08-07 ENCOUNTER — MYC MEDICAL ADVICE (OUTPATIENT)
Dept: FAMILY MEDICINE | Facility: CLINIC | Age: 73
End: 2023-08-07
Payer: COMMERCIAL

## 2023-08-07 NOTE — TELEPHONE ENCOUNTER
Called and spoke with patient. She started the doxycycline last night and felt okay with the first dose. This morning took another dose and felt nauseous. Had not eaten prior to taking. Had also taken 50 mg caffeine. Does feel somewhat better after drinking some water and eating a banana. Patient has been on this medication in the past without side effects. Advised patient to make sure she has at least a small snack prior to taking future doses. Patient agreeable to plan and will call back if nausea persists.     Jaimie Sam RN

## 2023-09-18 ENCOUNTER — E-VISIT (OUTPATIENT)
Dept: FAMILY MEDICINE | Facility: CLINIC | Age: 73
End: 2023-09-18
Payer: COMMERCIAL
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
## 2023-09-18 PROCEDURE — 99421 OL DIG E/M SVC 5-10 MIN: CPT | Performed by: FAMILY MEDICINE

## 2023-09-19 ENCOUNTER — MYC MEDICAL ADVICE (OUTPATIENT)
Dept: FAMILY MEDICINE | Facility: CLINIC | Age: 73
End: 2023-09-19
Payer: COMMERCIAL
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
## 2023-09-19 RX ORDER — DOXYCYCLINE HYCLATE 100 MG
100 TABLET ORAL 2 TIMES DAILY
Qty: 14 TABLET | Refills: 0 | Status: SHIPPED | OUTPATIENT
Start: 2023-09-19 | End: 2023-09-27

## 2023-09-19 RX ORDER — MOXIFLOXACIN HYDROCHLORIDE 400 MG/1
400 TABLET ORAL DAILY
Qty: 7 TABLET | Refills: 0 | Status: SHIPPED | OUTPATIENT
Start: 2023-09-19 | End: 2023-09-27

## 2023-09-19 NOTE — PATIENT INSTRUCTIONS
Thank you for choosing us for your care. I have placed an order for a prescription so that you can start treatment. View your full visit summary for details by clicking on the link below. Your pharmacist will able to address any questions you may have about the medication.     If you're not feeling better within 5-7 days, please schedule an appointment.  You can schedule an appointment right here in [facility], or call [phone]  If the visit is for the same symptoms as your eVisit, we'll refund the cost of your eVisit if seen within seven days.

## 2023-09-23 ENCOUNTER — HEALTH MAINTENANCE LETTER (OUTPATIENT)
Age: 73
End: 2023-09-23

## 2023-09-26 ASSESSMENT — ENCOUNTER SYMPTOMS
EYE PAIN: 0
MYALGIAS: 0
PARESTHESIAS: 0
DIARRHEA: 1
CHILLS: 1
HEMATOCHEZIA: 0
CONSTIPATION: 0
FREQUENCY: 0
COUGH: 1
BREAST MASS: 0
HEARTBURN: 0
PALPITATIONS: 0
WEAKNESS: 0
ARTHRALGIAS: 0
JOINT SWELLING: 0
HEADACHES: 1
NERVOUS/ANXIOUS: 0
NAUSEA: 1
DIZZINESS: 0
HEMATURIA: 0
FEVER: 0
DYSURIA: 0
SHORTNESS OF BREATH: 0
SORE THROAT: 0
ABDOMINAL PAIN: 0

## 2023-09-26 ASSESSMENT — ACTIVITIES OF DAILY LIVING (ADL): CURRENT_FUNCTION: NO ASSISTANCE NEEDED

## 2023-09-27 ENCOUNTER — OFFICE VISIT (OUTPATIENT)
Dept: FAMILY MEDICINE | Facility: CLINIC | Age: 73
End: 2023-09-27
Payer: COMMERCIAL

## 2023-09-27 VITALS
TEMPERATURE: 97.7 F | SYSTOLIC BLOOD PRESSURE: 112 MMHG | OXYGEN SATURATION: 98 % | HEIGHT: 61 IN | BODY MASS INDEX: 26.66 KG/M2 | WEIGHT: 141.2 LBS | RESPIRATION RATE: 16 BRPM | DIASTOLIC BLOOD PRESSURE: 67 MMHG | HEART RATE: 71 BPM

## 2023-09-27 DIAGNOSIS — M81.8 OTHER OSTEOPOROSIS WITHOUT CURRENT PATHOLOGICAL FRACTURE: ICD-10-CM

## 2023-09-27 DIAGNOSIS — Z13.220 LIPID SCREENING: ICD-10-CM

## 2023-09-27 DIAGNOSIS — Z78.0 MENOPAUSE: ICD-10-CM

## 2023-09-27 DIAGNOSIS — Z00.00 ENCOUNTER FOR MEDICARE ANNUAL WELLNESS EXAM: Primary | ICD-10-CM

## 2023-09-27 DIAGNOSIS — K51.919 ULCERATIVE COLITIS WITH COMPLICATION, UNSPECIFIED LOCATION (H): ICD-10-CM

## 2023-09-27 DIAGNOSIS — J47.9 BRONCHIECTASIS WITHOUT COMPLICATION (H): ICD-10-CM

## 2023-09-27 DIAGNOSIS — E63.9 POOR NUTRITION: ICD-10-CM
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
## 2023-09-27 LAB
ERYTHROCYTE [DISTWIDTH] IN BLOOD BY AUTOMATED COUNT: 13.1 % (ref 10–15)
HCT VFR BLD AUTO: 40.1 % (ref 35–47)
HGB BLD-MCNC: 13.2 G/DL (ref 11.7–15.7)
MCH RBC QN AUTO: 29.7 PG (ref 26.5–33)
MCHC RBC AUTO-ENTMCNC: 32.9 G/DL (ref 31.5–36.5)
MCV RBC AUTO: 90 FL (ref 78–100)
PLATELET # BLD AUTO: 176 10E3/UL (ref 150–450)
RBC # BLD AUTO: 4.45 10E6/UL (ref 3.8–5.2)
WBC # BLD AUTO: 5.2 10E3/UL (ref 4–11)

## 2023-09-27 PROCEDURE — 80061 LIPID PANEL: CPT | Performed by: FAMILY MEDICINE

## 2023-09-27 PROCEDURE — 36415 COLL VENOUS BLD VENIPUNCTURE: CPT | Performed by: FAMILY MEDICINE

## 2023-09-27 PROCEDURE — 99213 OFFICE O/P EST LOW 20 MIN: CPT | Mod: 25 | Performed by: FAMILY MEDICINE

## 2023-09-27 PROCEDURE — 80053 COMPREHEN METABOLIC PANEL: CPT | Performed by: FAMILY MEDICINE

## 2023-09-27 PROCEDURE — 82306 VITAMIN D 25 HYDROXY: CPT | Performed by: FAMILY MEDICINE

## 2023-09-27 PROCEDURE — 90662 IIV NO PRSV INCREASED AG IM: CPT | Performed by: FAMILY MEDICINE

## 2023-09-27 PROCEDURE — G0008 ADMIN INFLUENZA VIRUS VAC: HCPCS | Performed by: FAMILY MEDICINE

## 2023-09-27 PROCEDURE — G0439 PPPS, SUBSEQ VISIT: HCPCS | Performed by: FAMILY MEDICINE

## 2023-09-27 PROCEDURE — 82728 ASSAY OF FERRITIN: CPT | Performed by: FAMILY MEDICINE

## 2023-09-27 PROCEDURE — 85027 COMPLETE CBC AUTOMATED: CPT | Performed by: FAMILY MEDICINE

## 2023-09-27 ASSESSMENT — ENCOUNTER SYMPTOMS
DIARRHEA: 1
FEVER: 0
ARTHRALGIAS: 0
CONSTIPATION: 0
NAUSEA: 1
NERVOUS/ANXIOUS: 0
SORE THROAT: 0
FREQUENCY: 0
PARESTHESIAS: 0
EYE PAIN: 0
DYSURIA: 0
COUGH: 1
DIZZINESS: 0
WEAKNESS: 0
HEMATURIA: 0
ABDOMINAL PAIN: 0
HEADACHES: 1
BREAST MASS: 0
HEARTBURN: 0
PALPITATIONS: 0
JOINT SWELLING: 0
SHORTNESS OF BREATH: 0
MYALGIAS: 0
HEMATOCHEZIA: 0
CHILLS: 1

## 2023-09-27 ASSESSMENT — ACTIVITIES OF DAILY LIVING (ADL): CURRENT_FUNCTION: NO ASSISTANCE NEEDED

## 2023-09-27 NOTE — PROGRESS NOTES
"SUBJECTIVE:   Lauren is a 72 year old who presents for Preventive Visit.      9/27/2023    12:58 PM   Additional Questions   Roomed by        Are you in the first 12 months of your Medicare coverage?  No    Healthy Habits:     In general, how would you rate your overall health?  Excellent    Frequency of exercise:  None    Do you usually eat at least 4 servings of fruit and vegetables a day, include whole grains    & fiber and avoid regularly eating high fat or \"junk\" foods?  No    Taking medications regularly:  Yes    Medication side effects:  None    Ability to successfully perform activities of daily living:  No assistance needed    Home Safety:  Throw rugs in the hallway, lack of grab bars in the bathroom and lack of handrails on stairs    Hearing Impairment:  No hearing concerns    In the past 6 months, have you been bothered by leaking of urine?  No    In general, how would you rate your overall mental or emotional health?  Good    Additional concerns today:  No    Lauren presents for her annual exam.  We are also following up on a recent cat bite.  She submitted an E-visit and started doxycycline.  She is up-to-date on her tetanus shot.  She feels like its healed up mostly and no signs of infection.  She has been taking Fosamax for osteoporosis.  She admits that her diet is terrible.  She does not like to cook and she likes junk food.  She is eating a lot of junk food and fast food.  She is not getting much in the way of dairy so we discussed adding a calcium supplement.  She is taking a B12 and AREDS for her eyes.  Today's PHQ-2 Score:       9/26/2023     6:04 PM   PHQ-2 ( 1999 Pfizer)   Q1: Little interest or pleasure in doing things 2   Q2: Feeling down, depressed or hopeless 0   PHQ-2 Score 2   Q1: Little interest or pleasure in doing things More than half the days   Q2: Feeling down, depressed or hopeless Not at all   PHQ-2 Score 2           Have you ever done Advance Care Planning? (For example, a " Health Directive, POLST, or a discussion with a medical provider or your loved ones about your wishes): Yes - will bring a copy       Fall risk  Fallen 2 or more times in the past year?: Yes  Any fall with injury in the past year?: Yes    Cognitive Screening   1) Repeat 3 items (Leader, Season, Table)    2) Clock draw: NORMAL  3) 3 item recall: Recalls 3 objects  Results: NORMAL clock, 1-2 items recalled: COGNITIVE IMPAIRMENT LESS LIKELY    Mini-CogTM Copyright SANDRA Jonas. Licensed by the author for use in [facility]; reprinted with permission (ivette@[facility]). All rights reserved.      Do you have sleep apnea, excessive snoring or daytime drowsiness? : no    Reviewed and updated as needed this visit by clinical staff   Tobacco  Allergies               Reviewed and updated as needed this visit by Provider                 Social History     Tobacco Use    Smoking status: Never    Smokeless tobacco: Never   Substance Use Topics    Alcohol use: No             9/26/2023     6:02 PM   Alcohol Use   Prescreen: >3 drinks/day or >7 drinks/week? Not Applicable     Do you have a current opioid prescription? No  Do you use any other controlled substances or medications that are not prescribed by a provider? None              Current providers sharing in care for this patient include:   Patient Care Team:  Giovanna Parrish MD as PCP - General (Family Practice)  Giovanna Parrish MD as Assigned PCP    The following health maintenance items are reviewed in Epic and correct as of today:  Health Maintenance   Topic Date Due    HEPATITIS C SCREENING  Never done    ZOSTER IMMUNIZATION (1 of 2) Never done    COVID-19 Vaccine (5 - Pfizer series) 07/22/2022    ANNUAL REVIEW OF HM ORDERS  06/23/2023    MEDICARE ANNUAL WELLNESS VISIT  09/27/2024    FALL RISK ASSESSMENT  09/27/2024    MAMMO SCREENING  04/12/2025    LIPID  06/23/2027    ADVANCE CARE PLANNING  06/29/2027    COLORECTAL CANCER SCREENING  09/02/2027    DTAP/TDAP/TD  "IMMUNIZATION (4 - Td or Tdap) 08/04/2033    DEXA  06/09/2036    PHQ-2 (once per calendar year)  Completed    INFLUENZA VACCINE  Completed    Pneumococcal Vaccine: 65+ Years  Completed    IPV IMMUNIZATION  Aged Out    HPV IMMUNIZATION  Aged Out    MENINGITIS IMMUNIZATION  Aged Out     Lab work is in process          Review of Systems   Constitutional:  Positive for chills. Negative for fever.   HENT:  Negative for congestion, ear pain, hearing loss and sore throat.    Eyes:  Negative for pain and visual disturbance.   Respiratory:  Positive for cough. Negative for shortness of breath.    Cardiovascular:  Negative for chest pain, palpitations and peripheral edema.   Gastrointestinal:  Positive for diarrhea and nausea. Negative for abdominal pain, constipation, heartburn and hematochezia.   Breasts:  Negative for tenderness, breast mass and discharge.   Genitourinary:  Negative for dysuria, frequency, genital sores, hematuria, pelvic pain, urgency, vaginal bleeding and vaginal discharge.   Musculoskeletal:  Negative for arthralgias, joint swelling and myalgias.   Skin:  Negative for rash.   Neurological:  Positive for headaches. Negative for dizziness, weakness and paresthesias.   Psychiatric/Behavioral:  Negative for mood changes. The patient is not nervous/anxious.          OBJECTIVE:   /67   Pulse 71   Temp 97.7  F (36.5  C)   Resp 16   Ht 1.549 m (5' 1\")   Wt 64 kg (141 lb 3.2 oz)   LMP  (LMP Unknown)   SpO2 98%   BMI 26.68 kg/m   Estimated body mass index is 26.68 kg/m  as calculated from the following:    Height as of this encounter: 1.549 m (5' 1\").    Weight as of this encounter: 64 kg (141 lb 3.2 oz).  Physical Exam  GENERAL APPEARANCE: healthy, alert and no distress  EYES: Eyes grossly normal to inspection, PERRL and conjunctivae and sclerae normal  HENT: ear canals and TM's normal, nose and mouth without ulcers or lesions, oropharynx clear and oral mucous membranes moist  NECK: no adenopathy, " no asymmetry, masses, or scars and thyroid normal to palpation  RESP: lungs clear to auscultation - no rales, rhonchi or wheezes  BREAST: normal without masses, tenderness or nipple discharge, no palpable axillary masses or adenopathy, and bilateral implants with some contracture  CV: regular rate and rhythm, normal S1 S2, no S3 or S4, no murmur, click or rub, no peripheral edema and peripheral pulses strong  ABDOMEN: soft, nontender, no hepatosplenomegaly, no masses and bowel sounds normal  MS: no musculoskeletal defects are noted and gait is age appropriate without ataxia  SKIN: no suspicious lesions or rashes  NEURO: Normal strength and tone, sensory exam grossly normal, mentation intact and speech normal  PSYCH: mentation appears normal and affect normal/bright    Diagnostic Test Results:  Labs reviewed in Epic  Results for orders placed or performed in visit on 09/27/23 (from the past 24 hour(s))   CBC with platelets   Result Value Ref Range    WBC Count 5.2 4.0 - 11.0 10e3/uL    RBC Count 4.45 3.80 - 5.20 10e6/uL    Hemoglobin 13.2 11.7 - 15.7 g/dL    Hematocrit 40.1 35.0 - 47.0 %    MCV 90 78 - 100 fL    MCH 29.7 26.5 - 33.0 pg    MCHC 32.9 31.5 - 36.5 g/dL    RDW 13.1 10.0 - 15.0 %    Platelet Count 176 150 - 450 10e3/uL       ASSESSMENT / PLAN:   1. Encounter for Medicare annual wellness exam  Lauren presents for her annual exam.  As far as healthcare maintenance, she no longer needs colonoscopies that she had a colectomy for ulcerative colitis.  She is up-to-date on mammogram.  She is due for bone density.  We will update her flu shot today.    2. Other osteoporosis without current pathological fracture  She so far has had a good response to the Fosamax.  She is due again for bone density this year.  - CBC with platelets; Future  - Comprehensive metabolic panel; Future  - CBC with platelets  - Comprehensive metabolic panel    3. Ulcerative colitis with complication, unspecified location (H) - s/p  "colectomy      4. Bronchiectasis without complication (H)  She has a history of this.  However, she states since she started taking B12, she is not coughing anymore.    5. Cat bite, sequela  No current signs of infection, wound is healing well.    6. Lipid screening    - Lipid panel reflex to direct LDL Fasting; Future  - Lipid panel reflex to direct LDL Fasting    7. Menopause    - DX Hip/Pelvis/Spine; Future    8. Poor nutrition  She admits that she is eating very poorly, fast food and junk food, processed foods mostly.  We discussed some goals to try to fit in some fresh fruits and vegetables or maybe making some meals in the crockpot which would be easy.  - Vitamin D Deficiency; Future  - Ferritin; Future  - Vitamin D Deficiency  - Ferritin      Patient has been advised of split billing requirements and indicates understanding: Yes      COUNSELING:  Reviewed preventive health counseling, as reflected in patient instructions       Healthy diet/nutrition       Osteoporosis prevention/bone health      BMI:   Estimated body mass index is 26.68 kg/m  as calculated from the following:    Height as of this encounter: 1.549 m (5' 1\").    Weight as of this encounter: 64 kg (141 lb 3.2 oz).         She reports that she has never smoked. She has never used smokeless tobacco.      Appropriate preventive services were discussed with this patient, including applicable screening as appropriate for cardiovascular disease, diabetes, osteopenia/osteoporosis, and glaucoma.  As appropriate for age/gender, discussed screening for colorectal cancer, prostate cancer, breast cancer, and cervical cancer. Checklist reviewing preventive services available has been given to the patient.    Reviewed patients plan of care and provided an AVS. The Basic Care Plan (routine screening as documented in Health Maintenance) for Lauren meets the Care Plan requirement. This Care Plan has been established and reviewed with the Patient.          Giovanna STOKES" MD Francois  [facility]    Identified Health Risks:  I have reviewed Opioid Use Disorder and Substance Use Disorder risk factors and made any needed referrals.

## 2023-09-27 NOTE — PROGRESS NOTES
She is at risk for lack of exercise and has been provided with information to increase physical activity for the benefit of her well-being.  The patient was counseled and encouraged to consider modifying their diet and eating habits. She was provided with information on recommended healthy diet options.

## 2023-09-27 NOTE — PATIENT INSTRUCTIONS
"Patient Education   Personalized Prevention Plan  You are due for the preventive services outlined below.  Your care team is available to assist you in scheduling these services.  If you have already completed any of these items, please share that information with your care team to update in your medical record.  Health Maintenance Due   Topic Date Due     Hepatitis C Screening  Never done     Zoster (Shingles) Vaccine (1 of 2) Never done     COVID-19 Vaccine (5 - Pfizer series) 07/22/2022     Annual Wellness Visit  06/23/2023     ANNUAL REVIEW OF HM ORDERS  06/23/2023     Flu Vaccine (1) 09/01/2023     Learning About Being Physically Active  What is physical activity?     Being physically active means doing any kind of activity that gets your body moving.  The types of physical activity that can help you get fit and stay healthy include:  Aerobic or \"cardio\" activities. These make your heart beat faster and make you breathe harder, such as brisk walking, riding a bike, or running. They strengthen your heart and lungs and build up your endurance.  Strength training activities. These make your muscles work against, or \"resist,\" something. Examples include lifting weights or doing push-ups. These activities help tone and strengthen your muscles and bones.  Stretches. These let you move your joints and muscles through their full range of motion. Stretching helps you be more flexible.  Reaching a balance between these three types of physical activity is important because each one contributes to your overall fitness.  What are the benefits of being active?  Being active is one of the best things you can do for your health. It helps you to:  Feel stronger and have more energy to do all the things you like to do.  Focus better at school or work.  Feel, think, and sleep better.  Reach and stay at a healthy weight.  Lose fat and build lean muscle.  Lower your risk for serious health problems, including diabetes, heart " "attack, high blood pressure, and some cancers.  Keep your heart, lungs, bones, muscles, and joints strong and healthy.  How can you make being active part of your life?  Start slowly. Make it your long-term goal to get at least 30 minutes of exercise on most days of the week. Walking is a good choice. You also may want to do other activities, such as running, swimming, cycling, or playing tennis or team sports.  Pick activities that you like--ones that make your heart beat faster, your muscles stronger, and your muscles and joints more flexible. If you find more than one thing you like doing, do them all. You don't have to do the same thing every day.  Get your heart pumping every day. Any activity that makes your heart beat faster and keeps it at that rate for a while counts.  Here are some great ways to get your heart beating faster:  Go for a brisk walk, run, or bike ride.  Go for a hike or swim.  Go in-line skating.  Play a game of touch football, basketball, or soccer.  Ride a bike.  Play tennis or racquetball.  Climb stairs.  Even some household chores can be aerobic--just do them at a faster pace. Vacuuming, raking or mowing the lawn, sweeping the garage, and washing and waxing the car all can help get your heart rate up.  Strengthen your muscles during the week. You don't have to lift heavy weights or grow big, bulky muscles to get stronger. Doing a few simple activities that make your muscles work against, or \"resist,\" something can help you get stronger.  For example, you can:  Do push-ups or sit-ups, which use your own body weight as resistance.  Lift weights or dumbbells or use stretch bands at home or in a gym or community center.  Stretch your muscles often. Stretching will help you as you become more active. It can help you stay flexible, loosen tight muscles, and avoid injury. It can also help improve your balance and posture and can be a great way to relax.  Be sure to stretch the muscles you'll be " "using when you work out. It's best to warm your muscles slightly before you stretch them. Walk or do some other light aerobic activity for a few minutes, and then start stretching.  When you stretch your muscles:  Do it slowly. Stretching is not about going fast or making sudden movements.  Don't push or bounce during a stretch.  Hold each stretch for at least 15 to 30 seconds, if you can. You should feel a stretch in the muscle, but not pain.  Breathe out as you do the stretch. Then breathe in as you hold the stretch. Don't hold your breath.  If you're worried about how more activity might affect your health, have a checkup before you start. Follow any special advice your doctor gives you for getting a smart start.  Where can you learn more?  Go to https://www.[employer].net/patiented  Enter W332 in the search box to learn more about \"Learning About Being Physically Active.\"  Current as of: October 10, 2022               Content Version: 13.7    7345-6967 [employer].   Care instructions adapted under license by your healthcare professional. If you have questions about a medical condition or this instruction, always ask your healthcare professional. [employer] disclaims any warranty or liability for your use of this information.      Learning About Dietary Guidelines  What are the Dietary Guidelines for Americans?     Dietary Guidelines for Americans provide tips for eating well and staying healthy. This helps reduce the risk for long-term (chronic) diseases.  These guidelines recommend that you:  Eat and drink the right amount for you. The U.[place] government's food guide is called MyPlate. It can help you make your own well-balanced eating plan.  Try to balance your eating with your activity. This helps you stay at a healthy weight.  Drink alcohol in moderation, if at all.  Limit foods high in salt, saturated fat, trans fat, and added sugar.  These guidelines are from the [place] Department of " "Agriculture and the U.[place]. Department of Health and Human Services. They are updated every 5 years.  What is MyPlate?  MyPlate is the [place] government's food guide. It can help you make your own well-balanced eating plan. A balanced eating plan means that you eat enough, but not too much, and that your food gives you the nutrients you need to stay healthy.  MyPlate focuses on eating plenty of whole grains, fruits, and vegetables, and on limiting fat and sugar. It is available online at www.ChooseMyPlate.gov.  How can you get started?  If you're trying to eat healthier, you can slowly change your eating habits over time. You don't have to make big changes all at once. Start by adding one or two healthy foods to your meals each day.  Grains  Choose whole-grain breads and cereals and whole-wheat pasta and whole-grain crackers.  Vegetables  Eat a variety of vegetables every day. They have lots of nutrients and are part of a heart-healthy diet.  Fruits  Eat a variety of fruits every day. Fruits contain lots of nutrients. Choose fresh fruit instead of fruit juice.  Protein foods  Choose fish and lean poultry more often. Eat red meat and fried meats less often. Dried beans, tofu, and nuts are also good sources of protein.  Dairy  Choose low-fat or fat-free products from this food group. If you have problems digesting milk, try eating cheese or yogurt instead.  Fats and oils  Limit fats and oils if you're trying to cut calories. Choose healthy fats when you cook. These include canola oil and olive oil.  Where can you learn more?  Go to https://www.[employer].net/patiented  Enter D676 in the search box to learn more about \"Learning About Dietary Guidelines.\"  Current as of: March 1, 2023               Content Version: 13.7    4271-6500 [employer].   Care instructions adapted under license by your healthcare professional. If you have questions about a medical condition or this instruction, always ask your " healthcare professional. [employer] disclaims any warranty or liability for your use of this information.

## 2023-09-28 LAB
ALBUMIN SERPL BCG-MCNC: 4.1 G/DL (ref 3.5–5.2)
ALP SERPL-CCNC: 66 U/L (ref 35–104)
ALT SERPL W P-5'-P-CCNC: 10 U/L (ref 0–50)
ANION GAP SERPL CALCULATED.3IONS-SCNC: 12 MMOL/L (ref 7–15)
AST SERPL W P-5'-P-CCNC: 21 U/L (ref 0–45)
BILIRUB SERPL-MCNC: 0.6 MG/DL
BUN SERPL-MCNC: 24 MG/DL (ref 8–23)
CALCIUM SERPL-MCNC: 10.2 MG/DL (ref 8.8–10.2)
CHLORIDE SERPL-SCNC: 104 MMOL/L (ref 98–107)
CHOLEST SERPL-MCNC: 170 MG/DL
CREAT SERPL-MCNC: 0.88 MG/DL (ref 0.51–0.95)
EGFRCR SERPLBLD CKD-EPI 2021: 69 ML/MIN/1.73M2
FERRITIN SERPL-MCNC: 112 NG/ML (ref 11–328)
GLUCOSE SERPL-MCNC: 90 MG/DL (ref 70–99)
HCO3 SERPL-SCNC: 24 MMOL/L (ref 22–29)
HDLC SERPL-MCNC: 69 MG/DL
LDLC SERPL CALC-MCNC: 86 MG/DL
NONHDLC SERPL-MCNC: 101 MG/DL
POTASSIUM SERPL-SCNC: 4.8 MMOL/L (ref 3.4–5.3)
PROT SERPL-MCNC: 6.9 G/DL (ref 6.4–8.3)
SODIUM SERPL-SCNC: 140 MMOL/L (ref 135–145)
TRIGL SERPL-MCNC: 77 MG/DL
VIT D+METAB SERPL-MCNC: 45 NG/ML (ref 20–50)

## 2023-10-24 ENCOUNTER — HOSPITAL ENCOUNTER (OUTPATIENT)
Dept: BONE DENSITY | Facility: HOSPITAL | Age: 73
Discharge: HOME | End: 2023-10-24
Attending: FAMILY MEDICINE | Admitting: FAMILY MEDICINE
Payer: COMMERCIAL

## 2023-10-24 DIAGNOSIS — Z78.0 MENOPAUSE: ICD-10-CM

## 2023-10-24 PROCEDURE — 77080 DXA BONE DENSITY AXIAL: CPT

## 2024-03-11 ENCOUNTER — TELEPHONE (OUTPATIENT)
Dept: WOUND CARE | Facility: HOSPITAL | Age: 74
End: 2024-03-11
Payer: COMMERCIAL

## 2024-03-12 DIAGNOSIS — K51.919 ULCERATIVE COLITIS WITH COMPLICATION, UNSPECIFIED LOCATION (H): Primary | ICD-10-CM

## 2024-03-14 ENCOUNTER — TELEPHONE (OUTPATIENT)
Dept: WOUND CARE | Facility: HOSPITAL | Age: 74
End: 2024-03-14
Payer: COMMERCIAL

## 2024-03-15 ENCOUNTER — TELEPHONE (OUTPATIENT)
Dept: WOUND CARE | Facility: HOSPITAL | Age: 74
End: 2024-03-15
Payer: COMMERCIAL

## 2024-03-26 ENCOUNTER — HOSPITAL ENCOUNTER (OUTPATIENT)
Dept: WOUND CARE | Facility: HOSPITAL | Age: 74
Discharge: HOME | End: 2024-03-26
Attending: FAMILY MEDICINE | Admitting: FAMILY MEDICINE
Payer: COMMERCIAL

## 2024-03-26 DIAGNOSIS — K51.919 ULCERATIVE COLITIS WITH COMPLICATION, UNSPECIFIED LOCATION (H): ICD-10-CM

## 2024-03-26 DIAGNOSIS — Z43.2 ENCOUNTER FOR ATTENTION TO ILEOSTOMY (H): Primary | ICD-10-CM

## 2024-03-26 PROCEDURE — G0463 HOSPITAL OUTPT CLINIC VISIT: HCPCS

## 2024-03-26 NOTE — PROGRESS NOTES
"[facility]  OUTPATIENT OSTOMY ASSESSMENT    INTAKE  Type of Stoma: Permanent Ileostomy  Anticipated date of takedown: NA     Diagnosis Pertinent to Stoma:Ulcerative Colitis         Date of Diagnosis: Fall 2016  Type of Surgery: Ileostomy                                         Surgery Date: 2/13/17  Surgeon:Fermin Barbosa: [facility]     Purpose of this visit:Checkup:Yearly     Present for Teaching Session: Patient   Present: NA     Pertinent Information: Patient reports no real issues     Current Equipment:    Product # Brand Description   Pouch [address] 1 pc 1 1/8\" convex   Ring/Paste [id number] Coloplast Brava       Pouch Change Frequency: Every 5 - 7 days  Provider of Care: Emptying: Self Pouch Change: Self    ASSESSMENT  Stoma Size: Round 1 1/8 inches  Protrusion:  2 cm  Stoma Appearance: Pink and Moist  Mucocutaneous Juncture: Intact   Peristomal Skin: Intact - may be a small spot of mucosal seeding at 3 o'clock  Abdominal Assessment:  WDL    INTERVENTIONS  Refitting done    INSTRUCTIONS GIVEN  WOC Role    PLAN  Continue same Pouching System      Total Time Spent with Patient: 20 minutes   "

## 2024-03-26 NOTE — DISCHARGE INSTRUCTIONS
"OUTPATIENT OSTOMY INSTRUCTIONS                                                                        Type of Stoma: Ileostomy         Supplier:  Patient's choice      Equipment:    Product # Brand Description   Pouch [address] [place] 1 pc 1 1/8\"  convex   Paste [id number] Coloplast Brava        Procedure:  Close the bottom of the pouch.  If needed cut the opening of your pouch to the correct size (follow pattern or 1/8 inch larger than stoma) and then remove backings from adhesive surfaces.  Remove the soiled pouch and discard.  Wash skin with water and dry.    Then: Apply Ring/Paste: Around stoma.               Then: Apply pouching system to stoma site and hold in place for 2-5 minutes.     ______________________________________________________________________________    Pouch Change: Twice weekly       WOC Nurse Specialist: Josiane Zamorano RN CWOCN   Questions: [facility] [phone] ()      Follow-up Appointment: 2 years or as needed. Please call [facility] [phone] () to request an appointment.     "

## 2024-05-10 ENCOUNTER — MYC REFILL (OUTPATIENT)
Dept: FAMILY MEDICINE | Facility: CLINIC | Age: 74
End: 2024-05-10
Payer: COMMERCIAL

## 2024-05-10 DIAGNOSIS — M81.8 OTHER OSTEOPOROSIS WITHOUT CURRENT PATHOLOGICAL FRACTURE: ICD-10-CM

## 2024-05-10 RX ORDER — ALENDRONATE SODIUM 70 MG/1
TABLET ORAL
Qty: 12 TABLET | Refills: 0 | Status: SHIPPED | OUTPATIENT
Start: 2024-05-10 | End: 2024-10-01

## 2024-05-14 ENCOUNTER — E-VISIT (OUTPATIENT)
Dept: FAMILY MEDICINE | Facility: CLINIC | Age: 74
End: 2024-05-14
Payer: COMMERCIAL
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
## 2024-05-14 PROCEDURE — 99421 OL DIG E/M SVC 5-10 MIN: CPT | Performed by: FAMILY MEDICINE

## 2024-05-14 RX ORDER — DOXYCYCLINE HYCLATE 100 MG
100 TABLET ORAL 2 TIMES DAILY
Qty: 10 TABLET | Refills: 0 | Status: SHIPPED | OUTPATIENT
Start: 2024-05-14 | End: 2024-05-19

## 2024-05-14 NOTE — PATIENT INSTRUCTIONS
I would recommend antibiotics for the cat bite as they tend to get infected.  The antibiotics will take care of lymes, too. Since you have an allergy to penicillins, it is recommended to use a combination of doxycycline and metronidazole or clindamycin.  You have listed an allergy to both so we will just do the doxy, but if you start to show signs of infection then you need to be seen.      Thank you for choosing us for your care. I have placed an order for a prescription so that you can start treatment. View your full visit summary for details by clicking on the link below. Your pharmacist will able to address any questions you may have about the medication.     If you're not feeling better within 5-7 days, please schedule an appointment.  You can schedule an appointment right here in [facility], or call [phone]  If the visit is for the same symptoms as your eVisit, we'll refund the cost of your eVisit if seen within seven days.

## 2024-06-11 ENCOUNTER — ANCILLARY PROCEDURE (OUTPATIENT)
Dept: MAMMOGRAPHY | Facility: CLINIC | Age: 74
End: 2024-06-11
Attending: FAMILY MEDICINE
Payer: COMMERCIAL

## 2024-06-11 DIAGNOSIS — Z12.31 VISIT FOR SCREENING MAMMOGRAM: ICD-10-CM

## 2024-06-11 PROCEDURE — 77067 SCR MAMMO BI INCL CAD: CPT | Mod: TC | Performed by: RADIOLOGY

## 2024-06-11 PROCEDURE — 77063 BREAST TOMOSYNTHESIS BI: CPT | Mod: TC | Performed by: RADIOLOGY

## 2024-06-13 ENCOUNTER — ANCILLARY PROCEDURE (OUTPATIENT)
Dept: MAMMOGRAPHY | Facility: CLINIC | Age: 74
End: 2024-06-13
Attending: FAMILY MEDICINE
Payer: COMMERCIAL

## 2024-06-13 DIAGNOSIS — N63.20 MASS OF LEFT BREAST: ICD-10-CM

## 2024-06-13 DIAGNOSIS — R92.8 ABNORMAL SCREENING MAMMOGRAM: ICD-10-CM

## 2024-06-13 DIAGNOSIS — R92.2 INCONCLUSIVE MAMMOGRAM: ICD-10-CM

## 2024-06-13 PROCEDURE — 76642 ULTRASOUND BREAST LIMITED: CPT | Mod: LT

## 2024-06-13 PROCEDURE — G0279 TOMOSYNTHESIS, MAMMO: HCPCS | Mod: LT

## 2024-06-13 PROCEDURE — 77061 BREAST TOMOSYNTHESIS UNI: CPT | Mod: LT

## 2024-08-11 ENCOUNTER — E-VISIT (OUTPATIENT)
Dept: FAMILY MEDICINE | Facility: CLINIC | Age: 74
End: 2024-08-11
Payer: COMMERCIAL

## 2024-08-11 ENCOUNTER — NURSE TRIAGE (OUTPATIENT)
Dept: NURSING | Facility: CLINIC | Age: 74
End: 2024-08-11
Payer: COMMERCIAL
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
## 2024-08-11 PROCEDURE — 99421 OL DIG E/M SVC 5-10 MIN: CPT | Performed by: FAMILY MEDICINE

## 2024-08-11 RX ORDER — DOXYCYCLINE HYCLATE 100 MG
100 TABLET ORAL 2 TIMES DAILY
Qty: 14 TABLET | Refills: 0 | Status: SHIPPED | OUTPATIENT
Start: 2024-08-11

## 2024-08-11 NOTE — TELEPHONE ENCOUNTER
No consent to communicate. No triage performed.  is calling regarding patients cat bite. Caller is advised that cat bites can become easily infected and if there is any reason to believe that has become infected patient should be seen.   verbalized understanding of care advice and is given address for [facility].  Damaris Carias RN on 8/11/2024 at 5:26 AM      Reason for Disposition    Health Information question, no triage required and triager able to answer question    Protocols used: Information Only Call - No Triage-A-

## 2024-08-28 ENCOUNTER — PATIENT OUTREACH (OUTPATIENT)
Dept: CARE COORDINATION | Facility: CLINIC | Age: 74
End: 2024-08-28
Payer: COMMERCIAL

## 2024-10-01 ENCOUNTER — MYC MEDICAL ADVICE (OUTPATIENT)
Dept: FAMILY MEDICINE | Facility: CLINIC | Age: 74
End: 2024-10-01
Payer: COMMERCIAL

## 2024-10-01 ENCOUNTER — MYC REFILL (OUTPATIENT)
Dept: FAMILY MEDICINE | Facility: CLINIC | Age: 74
End: 2024-10-01
Payer: COMMERCIAL

## 2024-10-01 DIAGNOSIS — M81.8 OTHER OSTEOPOROSIS WITHOUT CURRENT PATHOLOGICAL FRACTURE: ICD-10-CM

## 2024-10-01 DIAGNOSIS — Z93.3 COLOSTOMY STATUS (H): Primary | ICD-10-CM

## 2024-10-02 RX ORDER — ALENDRONATE SODIUM 70 MG/1
TABLET ORAL
Qty: 12 TABLET | Refills: 0 | Status: SHIPPED | OUTPATIENT
Start: 2024-10-02

## 2024-10-02 NOTE — TELEPHONE ENCOUNTER
There are some additional information I need to order - can you find out from pt - one piece or two piece?  And then follow the drop down questions on the order from there as they need more information. thanks

## 2024-10-02 NOTE — TELEPHONE ENCOUNTER
Called and spoke with patient, filled out DME form for review. Please route back to pool when completed and we will get it faxed over to the DME supplier. Thanks!    Jaimie Sam RN

## 2024-10-24 ENCOUNTER — OFFICE VISIT (OUTPATIENT)
Dept: FAMILY MEDICINE | Facility: CLINIC | Age: 74
End: 2024-10-24
Attending: FAMILY MEDICINE
Payer: COMMERCIAL

## 2024-10-24 VITALS
HEIGHT: 61 IN | SYSTOLIC BLOOD PRESSURE: 112 MMHG | DIASTOLIC BLOOD PRESSURE: 71 MMHG | WEIGHT: 145.6 LBS | BODY MASS INDEX: 27.49 KG/M2 | TEMPERATURE: 98.2 F | HEART RATE: 58 BPM | OXYGEN SATURATION: 100 % | RESPIRATION RATE: 16 BRPM

## 2024-10-24 DIAGNOSIS — K51.919 ULCERATIVE COLITIS WITH COMPLICATION, UNSPECIFIED LOCATION (H): ICD-10-CM

## 2024-10-24 DIAGNOSIS — E63.9 POOR NUTRITION: ICD-10-CM

## 2024-10-24 DIAGNOSIS — Z11.59 NEED FOR HEPATITIS C SCREENING TEST: ICD-10-CM

## 2024-10-24 DIAGNOSIS — R53.82 CHRONIC FATIGUE: ICD-10-CM

## 2024-10-24 DIAGNOSIS — M81.8 OTHER OSTEOPOROSIS WITHOUT CURRENT PATHOLOGICAL FRACTURE: ICD-10-CM

## 2024-10-24 DIAGNOSIS — Z13.220 LIPID SCREENING: ICD-10-CM

## 2024-10-24 DIAGNOSIS — Z00.00 ENCOUNTER FOR MEDICARE ANNUAL WELLNESS EXAM: Primary | ICD-10-CM

## 2024-10-24 DIAGNOSIS — J47.9 BRONCHIECTASIS WITHOUT COMPLICATION (H): ICD-10-CM

## 2024-10-24 DIAGNOSIS — D64.9 NORMOCYTIC ANEMIA: ICD-10-CM

## 2024-10-24 LAB
ERYTHROCYTE [DISTWIDTH] IN BLOOD BY AUTOMATED COUNT: 13.2 % (ref 10–15)
HCT VFR BLD AUTO: 38.8 % (ref 35–47)
HGB BLD-MCNC: 12.6 G/DL (ref 11.7–15.7)
MCH RBC QN AUTO: 29.8 PG (ref 26.5–33)
MCHC RBC AUTO-ENTMCNC: 32.5 G/DL (ref 31.5–36.5)
MCV RBC AUTO: 92 FL (ref 78–100)
PLATELET # BLD AUTO: 151 10E3/UL (ref 150–450)
RBC # BLD AUTO: 4.23 10E6/UL (ref 3.8–5.2)
WBC # BLD AUTO: 4 10E3/UL (ref 4–11)

## 2024-10-24 PROCEDURE — 90480 ADMN SARSCOV2 VAC 1/ONLY CMP: CPT | Performed by: FAMILY MEDICINE

## 2024-10-24 PROCEDURE — 84443 ASSAY THYROID STIM HORMONE: CPT | Performed by: FAMILY MEDICINE

## 2024-10-24 PROCEDURE — 80061 LIPID PANEL: CPT | Performed by: FAMILY MEDICINE

## 2024-10-24 PROCEDURE — 36415 COLL VENOUS BLD VENIPUNCTURE: CPT | Performed by: FAMILY MEDICINE

## 2024-10-24 PROCEDURE — 82607 VITAMIN B-12: CPT | Performed by: FAMILY MEDICINE

## 2024-10-24 PROCEDURE — 82306 VITAMIN D 25 HYDROXY: CPT | Performed by: FAMILY MEDICINE

## 2024-10-24 PROCEDURE — 82728 ASSAY OF FERRITIN: CPT | Performed by: FAMILY MEDICINE

## 2024-10-24 PROCEDURE — 99214 OFFICE O/P EST MOD 30 MIN: CPT | Mod: 25 | Performed by: FAMILY MEDICINE

## 2024-10-24 PROCEDURE — 91320 SARSCV2 VAC 30MCG TRS-SUC IM: CPT | Performed by: FAMILY MEDICINE

## 2024-10-24 PROCEDURE — G0008 ADMIN INFLUENZA VIRUS VAC: HCPCS | Performed by: FAMILY MEDICINE

## 2024-10-24 PROCEDURE — G0439 PPPS, SUBSEQ VISIT: HCPCS | Performed by: FAMILY MEDICINE

## 2024-10-24 PROCEDURE — 90662 IIV NO PRSV INCREASED AG IM: CPT | Performed by: FAMILY MEDICINE

## 2024-10-24 PROCEDURE — 86803 HEPATITIS C AB TEST: CPT | Performed by: FAMILY MEDICINE

## 2024-10-24 PROCEDURE — 80053 COMPREHEN METABOLIC PANEL: CPT | Performed by: FAMILY MEDICINE

## 2024-10-24 PROCEDURE — 85027 COMPLETE CBC AUTOMATED: CPT | Performed by: FAMILY MEDICINE

## 2024-10-24 SDOH — HEALTH STABILITY: PHYSICAL HEALTH: ON AVERAGE, HOW MANY DAYS PER WEEK DO YOU ENGAGE IN MODERATE TO STRENUOUS EXERCISE (LIKE A BRISK WALK)?: 4 DAYS

## 2024-10-24 ASSESSMENT — SOCIAL DETERMINANTS OF HEALTH (SDOH): HOW OFTEN DO YOU GET TOGETHER WITH FRIENDS OR RELATIVES?: PATIENT DECLINED

## 2024-10-24 NOTE — PATIENT INSTRUCTIONS
Patient Education   Preventive Care Advice   This is general advice given by our system to help you stay healthy. However, your care team may have specific advice just for you. Please talk to your care team about your preventive care needs.  Nutrition  Eat 5 or more servings of fruits and vegetables each day.  Try wheat bread, brown rice and whole grain pasta (instead of white bread, rice, and pasta).  Get enough calcium and vitamin D. Check the label on foods and aim for 100% of the RDA (recommended daily allowance).  Lifestyle  Exercise at least 150 minutes each week  (30 minutes a day, 5 days a week).  Do muscle strengthening activities 2 days a week. These help control your weight and prevent disease.  No smoking.  Wear sunscreen to prevent skin cancer.  Have a dental exam and cleaning every 6 months.  Yearly exams  See your health care team every year to talk about:  Any changes in your health.  Any medicines your care team has prescribed.  Preventive care, family planning, and ways to prevent chronic diseases.  Shots (vaccines)   HPV shots (up to age 26), if you've never had them before.  Hepatitis B shots (up to age 59), if you've never had them before.  COVID-19 shot: Get this shot when it's due.  Flu shot: Get a flu shot every year.  Tetanus shot: Get a tetanus shot every 10 years.  Pneumococcal, hepatitis A, and RSV shots: Ask your care team if you need these based on your risk.  Shingles shot (for age 50 and up)  General health tests  Diabetes screening:  Starting at age 35, Get screened for diabetes at least every 3 years.  If you are younger than age 35, ask your care team if you should be screened for diabetes.  Cholesterol test: At age 39, start having a cholesterol test every 5 years, or more often if advised.  Bone density scan (DEXA): At age 50, ask your care team if you should have this scan for osteoporosis (brittle bones).  Hepatitis C: Get tested at least once in your life.  STIs (sexually  transmitted infections)  Before age 24: Ask your care team if you should be screened for STIs.  After age 24: Get screened for STIs if you're at risk. You are at risk for STIs (including HIV) if:  You are sexually active with more than one person.  You don't use condoms every time.  You or a partner was diagnosed with a sexually transmitted infection.  If you are at risk for HIV, ask about PrEP medicine to prevent HIV.  Get tested for HIV at least once in your life, whether you are at risk for HIV or not.  Cancer screening tests  Cervical cancer screening: If you have a cervix, begin getting regular cervical cancer screening tests starting at age 21.  Breast cancer scan (mammogram): If you've ever had breasts, begin having regular mammograms starting at age 40. This is a scan to check for breast cancer.  Colon cancer screening: It is important to start screening for colon cancer at age 45.  Have a colonoscopy test every 10 years (or more often if you're at risk) Or, ask your provider about stool tests like a FIT test every year or Cologuard test every 3 years.  To learn more about your testing options, visit:   .  For help making a decision, visit:   https://bit.ly/dd62013.  Prostate cancer screening test: If you have a prostate, ask your care team if a prostate cancer screening test (PSA) at age 55 is right for you.  Lung cancer screening: If you are a current or former smoker ages 50 to 80, ask your care team if ongoing lung cancer screenings are right for you.  For informational purposes only. Not to replace the advice of your health care provider. Copyright   2023 [facility]. All rights reserved. Clinically reviewed by the [facility]. DaWanda [id number] - REV 01/24.  Learning About Activities of Daily Living  What are activities of daily living?     Activities of daily living (ADLs) are the basic self-care tasks you do every day. These include eating, bathing, dressing,  and moving around.  As you age, and if you have health problems, you may find that it's harder to do some of these tasks. If so, your doctor can suggest ideas that may help.  To measure what kind of help you may need, your doctor will ask how well you are able to do ADLs. Let your doctor know if there are any tasks that you are having trouble doing. This is an important first step to getting help. And when you have the help you need, you can stay as independent as possible.  How will a doctor assess your ADLs?  Asking about ADLs is part of a routine health checkup your doctor will likely do as you age. Your health check might be done in a doctor's office, in your home, or at a hospital. The goal is to find out if you are having any problems that could make it hard to care for yourself or that make it unsafe for you to be on your own.  To measure your ADLs, your doctor will ask how hard it is for you to do routine tasks. Your doctor may also want to know if you have changed the way you do a task because of a health problem. Your doctor may watch how you:  Walk back and forth.  Keep your balance while you stand or walk.  Move from sitting to standing or from a bed to a chair.  Button or unbutton a shirt or sweater.  Remove and put on your shoes.  It's common to feel a little worried or anxious if you find you can't do all the things you used to be able to do. Talking with your doctor about ADLs is a way to make sure you're as safe as possible and able to care for yourself as well as you can. You may want to bring a caregiver, friend, or family member to your checkup. They can help you talk to your doctor.  Follow-up care is a key part of your treatment and safety. Be sure to make and go to all appointments, and call your doctor if you are having problems. It's also a good idea to know your test results and keep a list of the medicines you take.  Current as of: October 24, 2023  Content Version: 14.2 2024 [facility]  [employer].   Care instructions adapted under license by your healthcare professional. If you have questions about a medical condition or this instruction, always ask your healthcare professional. [employer] disclaims any warranty or liability for your use of this information.    Preventing Falls: Care Instructions  Injuries and health problems such as trouble walking or poor eyesight can increase your risk of falling. So can some medicines. But there are things you can do to help prevent falls. You can exercise to get stronger. You can also arrange your home to make it safer.    Talk to your doctor about the medicines you take. Ask if any of them increase the risk of falls and whether they can be changed or stopped.   Try to exercise regularly. It can help improve your strength and balance. This can help lower your risk of falling.         Practice fall safety and prevention.   Wear low-heeled shoes that fit well and give your feet good support. Talk to your doctor if you have foot problems that make this hard.  Carry a cellphone or wear a medical alert device that you can use to call for help.  Use stepladders instead of chairs to reach high objects. Don't climb if you're at risk for falls. Ask for help, if needed.  Wear the correct eyeglasses, if you need them.        Make your home safer.   Remove rugs, cords, clutter, and furniture from walkways.  Keep your house well lit. Use night-lights in hallways and bathrooms.  Install and use sturdy handrails on stairways.  Wear nonskid footwear, even inside. Don't walk barefoot or in socks without shoes.        Be safe outside.   Use handrails, curb cuts, and ramps whenever possible.  Keep your hands free by using a shoulder bag or backpack.  Try to walk in well-lit areas. Watch out for uneven ground, changes in pavement, and debris.  Be careful in the winter. Walk on the grass or gravel when sidewalks are slippery. Use de-icer on steps and walkways.  "Add non-slip devices to shoes.    Put grab bars and nonskid mats in your shower or tub and near the toilet. Try to use a shower chair or bath bench when bathing.   Get into a tub or shower by putting in your weaker leg first. Get out with your strong side first. Have a phone or medical alert device in the bathroom with you.   Where can you learn more?  Go to https://www.[employer].net/patiented  Enter G117 in the search box to learn more about \"Preventing Falls: Care Instructions.\"  Current as of: July 17, 2023  Content Version: 14.2 2024 [employer].   Care instructions adapted under license by your healthcare professional. If you have questions about a medical condition or this instruction, always ask your healthcare professional. [employer] disclaims any warranty or liability for your use of this information.    Hearing Loss: Care Instructions  Overview     Hearing loss is a sudden or slow decrease in how well you hear. It can range from slight to profound. Permanent hearing loss can occur with aging. It also can happen when you are exposed long-term to loud noise. Examples include listening to loud music, riding motorcycles, or being around other loud machines.  Hearing loss can affect your work and home life. It can make you feel lonely or depressed. You may feel that you have lost your independence. But hearing aids and other devices can help you hear better and feel connected to others.  Follow-up care is a key part of your treatment and safety. Be sure to make and go to all appointments, and call your doctor if you are having problems. It's also a good idea to know your test results and keep a list of the medicines you take.  How can you care for yourself at home?  Avoid loud noises whenever possible. This helps keep your hearing from getting worse.  Always wear hearing protection around loud noises.  Wear a hearing aid as directed.  A professional can help you pick a hearing aid " "that will work best for you.  You can also get hearing aids over the counter for mild to moderate hearing loss.  Have hearing tests as your doctor suggests. They can show whether your hearing has changed. Your hearing aid may need to be adjusted.  Use other devices as needed. These may include:  Telephone amplifiers and hearing aids that can connect to a television, stereo, radio, or microphone.  Devices that use lights or vibrations. These alert you to the doorbell, a ringing telephone, or a baby monitor.  Television closed-captioning. This shows the words at the bottom of the screen. Most new TVs can do this.  TTY (text telephone). This lets you type messages back and forth on the telephone instead of talking or listening. These devices are also called TDD. When messages are typed on the keyboard, they are sent over the phone line to a receiving TTY. The message is shown on a monitor.  Use text messaging, social media, and email if it is hard for you to communicate by telephone.  Try to learn a listening technique called speechreading. It is not lipreading. You pay attention to people's gestures, expressions, posture, and tone of voice. These clues can help you understand what a person is saying. Face the person you are talking to, and have them face you. Make sure the lighting is good. You need to see the other person's face clearly.  Think about counseling if you need help to adjust to your hearing loss.  When should you call for help?  Watch closely for changes in your health, and be sure to contact your doctor if:    You think your hearing is getting worse.     You have new symptoms, such as dizziness or nausea.   Where can you learn more?  Go to https://www.[employer].net/patiented  Enter R798 in the search box to learn more about \"Hearing Loss: Care Instructions.\"  Current as of: September 27, 2023  Content Version: 14.2 2024 [employer].   Care instructions adapted under license by your " healthcare professional. If you have questions about a medical condition or this instruction, always ask your healthcare professional. Healthwise, [facility] disclaims any warranty or liability for your use of this information.    Learning About Sleeping Well  What does sleeping well mean?     Sleeping well means getting enough sleep to feel good and stay healthy. How much sleep is enough varies among people.  The number of hours you sleep and how you feel when you wake up are both important. If you do not feel refreshed, you probably need more sleep. Another sign of not getting enough sleep is feeling tired during the day.  Experts recommend that adults get at least 7 or more hours of sleep per day. Children and older adults need more sleep.  Why is getting enough sleep important?  Getting enough quality sleep is a basic part of good health. When your sleep suffers, your physical health, mood, and your thoughts can suffer too. You may find yourself feeling more grumpy or stressed. Not getting enough sleep also can lead to serious problems, including injury, accidents, anxiety, and depression.  What might cause poor sleeping?  Many things can cause sleep problems, including:  Changes to your sleep schedule.  Stress. Stress can be caused by fear about a single event, such as giving a speech. Or you may have ongoing stress, such as worry about work or school.  Depression, anxiety, and other mental or emotional conditions.  Changes in your sleep habits or surroundings. This includes changes that happen where you sleep, such as noise, light, or sleeping in a different bed. It also includes changes in your sleep pattern, such as having jet lag or working a late shift.  Health problems, such as pain, breathing problems, and restless legs syndrome.  Lack of regular exercise.  Using alcohol, nicotine, or caffeine before bed.  How can you help yourself?  Here are some tips that may help you sleep more soundly and wake up  "feeling more refreshed.  Your sleeping area   Use your bedroom only for sleeping and sex. A bit of light reading may help you fall asleep. But if it doesn't, do your reading elsewhere in the house. Try not to use your TV, computer, smartphone, or tablet while you are in bed.  Be sure your bed is big enough to stretch out comfortably, especially if you have a sleep partner.  Keep your bedroom quiet, dark, and cool. Use curtains, blinds, or a sleep mask to block out light. To block out noise, use earplugs, soothing music, or a \"white noise\" machine.  Your evening and bedtime routine   Create a relaxing bedtime routine. You might want to take a warm shower or bath, or listen to soothing music.  Go to bed at the same time every night. And get up at the same time every morning, even if you feel tired.  What to avoid   Limit caffeine (coffee, tea, caffeinated sodas) during the day, and don't have any for at least 6 hours before bedtime.  Avoid drinking alcohol before bedtime. Alcohol can cause you to wake up more often during the night.  Try not to smoke or use tobacco, especially in the evening. Nicotine can keep you awake.  Limit naps during the day, especially close to bedtime.  Avoid lying in bed awake for too long. If you can't fall asleep or if you wake up in the middle of the night and can't get back to sleep within about 20 minutes, get out of bed and go to another room until you feel sleepy.  Avoid taking medicine right before bed that may keep you awake or make you feel hyper or energized. Your doctor can tell you if your medicine may do this and if you can take it earlier in the day.  If you can't sleep   Imagine yourself in a peaceful, pleasant scene. Focus on the details and feelings of being in a place that is relaxing.  Get up and do a quiet or boring activity until you feel sleepy.  Avoid drinking any liquids before going to bed to help prevent waking up often to use the bathroom.  Where can you learn " "more?  Go to https://www.[employer].net/patiented  Enter J942 in the search box to learn more about \"Learning About Sleeping Well.\"  Current as of: July 10, 2023  Content Version: 14.2 2024 [facility] [employer], [facility].   Care instructions adapted under license by your healthcare professional. If you have questions about a medical condition or this instruction, always ask your healthcare professional. Healthwise, Incorporated disclaims any warranty or liability for your use of this information.       "

## 2024-10-24 NOTE — PROGRESS NOTES
Preventive Care Visit  [facility]  Giovanna Parrish MD, Family Medicine  Oct 24, 2024      1. Encounter for Medicare annual wellness exam (Primary)  Lauren comes in today for her annual wellness exam.  She has had a colectomy so no longer does colonoscopies.  She is up-to-date on mammogram.  She had a bone density in 2023 so will be due again in 2025.  Flu and COVID boosters were given today.    2. Need for hepatitis C screening test  Will risk screening  - Hepatitis C Screen Reflex to HCV RNA Quant and Genotype; Future    3. Other osteoporosis without current pathological fracture  Her osteoporosis has been fairly stable now on the Fosamax.  Will recheck again next year.  She is not very good about calcium and vitamin D.  We had discussed a supplement last year and she admits she did not get around to getting it.  Will try to make more of an effort this year.  - Vitamin D Deficiency; Future    4. Ulcerative colitis with complication, unspecified location (H) - s/p colectomy  Status post colectomy.    5. Bronchiectasis without complication (H)  She really has not had a return of any of her symptoms in the last year and a half.    6. Chronic fatigue  She does get some fatigue, especially after big meal.  She admits to really poor nutrition which I think might be a factor.  Will check some additional blood work.  - CBC with platelets; Future  - Comprehensive metabolic panel; Future  - TSH with free T4 reflex; Future    7. Poor nutrition    - Vitamin B12; Future    8. Lipid screening    - Lipid panel reflex to direct LDL Fasting; Future    9. Normocytic anemia  She has a history of anemia in the past with poor nutrition.  Will check iron levels.  - Ferritin; Future      Subjective   Lauren is a 73 year old, presenting for the following:  Physical (Fasting. Fatigue - check thyroid. )        10/24/2024    11:08 AM   Additional Questions   Roomed by          HPI  She comes in today for her  annual exam.  She states overall she is feeling fairly well.  Sometimes she gets a little stressed out but she feels like this is normal.  She does get a little fatigued.  She has a colectomy and was told to eat more frequent smaller meals but sometimes if she eats a bigger meal then she feels fatigued after.  She admits to really poor nutrition.  She is not taking any supplements other than for her eyes.  She is taking the Fosamax for osteoporosis but does not feel like she is getting enough calcium and vitamin D in her diet or supplement.        Health Care Directive  Patient does not have a Health Care Directive: Discussed advance care planning with patient; information given to patient to review.      10/24/2024   General Health   How would you rate your overall physical health? Excellent   Feel stress (tense, anxious, or unable to sleep) Only a little      (!) STRESS CONCERN      10/24/2024   Nutrition   Diet: Regular (no restrictions)    Breakfast skipped       Multiple values from one day are sorted in reverse-chronological order         10/24/2024   Exercise   Days per week of moderate/strenous exercise 4 days            10/24/2024   Social Factors   Frequency of gathering with friends or relatives Patient declined   Worry food won't last until get money to buy more No   Food not last or not have enough money for food? No   Do you have housing? (Housing is defined as stable permanent housing and does not include staying ouside in a car, in a tent, in an abandoned building, in an overnight shelter, or couch-surfing.) Yes   Are you worried about losing your housing? No   Lack of transportation? No   Unable to get utilities (heat,electricity)? No            10/24/2024   Fall Risk   Fallen 2 or more times in the past year? Yes     Yes    Trouble with walking or balance? No     No    Gait Speed Test (Document in seconds) 4.36   Gait Speed Test Interpretation Less than or equal to 5.00 seconds - PASS        Patient-reported    Multiple values from one day are sorted in reverse-chronological order          10/24/2024   Activities of Daily Living- Home Safety   Needs help with the following daily activites Transportation   Safety concerns in the home None of the above            10/24/2024   Dental   Dentist two times every year? Yes            10/24/2024   Hearing Screening   Hearing concerns? (!) IT'S HARD TO FOLLOW A CONVERSATION IN A NOISY RESTAURANT OR CROWDED ROOM.    (!) TROUBLE UNDESTANDING A SPEAKER IN A PUBLIC MEETING OR [employer] SERVICE.       Multiple values from one day are sorted in reverse-chronological order         10/24/2024   Driving Risk Screening   Patient/family members have concerns about driving (!) YES             10/24/2024   General Alertness/Fatigue Screening   Have you been more tired than usual lately? (!) YES            10/24/2024   Urinary Incontinence Screening   Bothered by leaking urine in past 6 months No            10/24/2024   TB Screening   Were you born outside of the [place]? No            Today's PHQ-2 Score:       10/24/2024    11:05 AM   PHQ-2 ( 1999 Pf[employer])   Q1: Little interest or pleasure in doing things 0    Q2: Feeling down, depressed or hopeless 0    PHQ-2 Score 0    Q1: Little interest or pleasure in doing things Not at all   Q2: Feeling down, depressed or hopeless Not at all   PHQ-2 Score 0       Patient-reported           10/24/2024   Substance Use   Alcohol more than 3/day or more than 7/wk Not Applicable   Do you have a current opioid prescription? No   How severe/bad is pain from 1 to 10? 0/10 (No Pain)   Do you use any other substances recreationally? (!) OTHER        Social History     Tobacco Use    Smoking status: Never    Smokeless tobacco: Never   Vaping Use    Vaping status: Never Used   Substance Use Topics    Alcohol use: No    Drug use: No           6/13/2024   LAST FHS-7 RESULTS   1st degree relative breast or ovarian cancer No   Any relative bilateral breast  cancer No   Any male have breast cancer No   Any ONE woman have BOTH breast AND ovarian cancer No   Any woman with breast cancer before 50yrs No   2 or more relatives with breast AND/OR ovarian cancer No   2 or more relatives with breast AND/OR bowel cancer No           Mammogram Screening - Mammogram every 1-2 years updated in Health Maintenance based on mutual decision making    ASCVD Risk   The 10-year ASCVD risk score (Tonny DAVIS, et al., 2019) is: 11%    Values used to calculate the score:      Age: 73 years      Sex: Female      Is Non- : No      Diabetic: No      Tobacco smoker: No      Systolic Blood Pressure: 112 mmHg      Is BP treated: No      HDL Cholesterol: 69 mg/dL      Total Cholesterol: 170 mg/dL            Reviewed and updated as needed this visit by Provider                    Lab work is in process  Current Outpatient Medications   Medication Sig Dispense Refill    alendronate (FOSAMAX) 70 MG tablet TAKE 1 TABLET BY MOUTH ONCE A WEEK IN THE MORNING WITH A FULL GLASS OF WATER 30 MINUTES BEFORE THE FIRST MEAL BEVERAGE OR MEDICATION OF THE 12 tablet 0    Multiple Vitamins-Minerals (ICAPS AREDS 2 PO)        Current providers sharing in care for this patient include:  Patient Care Team:  Giovanna Parrish MD as PCP - General (Family Practice)  Giovanna Parrish MD as Assigned PCP    The following health maintenance items are reviewed in Epic and correct as of today:  Health Maintenance   Topic Date Due    HEPATITIS C SCREENING  Never done    ZOSTER IMMUNIZATION (1 of 2) Never done    RSV VACCINE (1 - Risk 60-74 years 1-dose series) Never done    ANNUAL REVIEW OF HM ORDERS  06/23/2023    MEDICARE ANNUAL WELLNESS VISIT  10/24/2025    FALL RISK ASSESSMENT  10/24/2025    MAMMO SCREENING  06/13/2026    GLUCOSE  09/27/2026    COLORECTAL CANCER SCREENING  09/02/2027    LIPID  09/27/2028    ADVANCE CARE PLANNING  10/24/2029    DTAP/TDAP/TD IMMUNIZATION (4 - Td or Tdap) 08/04/2033     "DEXA  10/24/2038    PHQ-2 (once per calendar year)  Completed    INFLUENZA VACCINE  Completed    Pneumococcal Vaccine: 65+ Years  Completed    COVID-19 Vaccine  Completed    HPV IMMUNIZATION  Aged Out    MENINGITIS IMMUNIZATION  Aged Out    RSV MONOCLONAL ANTIBODY  Aged Out         Review of Systems  Constitutional, HEENT, cardiovascular, pulmonary, gi and gu systems are negative, except as otherwise noted.     Objective    Exam  /71   Pulse 58   Temp 98.2  F (36.8  C)   Resp 16   Ht 1.556 m (5' 1.25\")   Wt 66 kg (145 lb 9.6 oz)   LMP  (LMP Unknown)   SpO2 100%   BMI 27.29 kg/m     Estimated body mass index is 27.29 kg/m  as calculated from the following:    Height as of this encounter: 1.556 m (5' 1.25\").    Weight as of this encounter: 66 kg (145 lb 9.6 oz).    Physical Exam  GENERAL: alert and no distress  EYES: Eyes grossly normal to inspection, PERRL and conjunctivae and sclerae normal  HENT: ear canals and TM's normal, nose and mouth without ulcers or lesions  NECK: no adenopathy, no asymmetry, masses, or scars  RESP: lungs clear to auscultation - no rales, rhonchi or wheezes  CV: regular rate and rhythm, normal S1 S2, no S3 or S4, no murmur, click or rub, no peripheral edema  ABDOMEN: soft, nontender, no hepatosplenomegaly, no masses and bowel sounds normal, colectomy  MS: no gross musculoskeletal defects noted, no edema  SKIN: no suspicious lesions or rashes  NEURO: Normal strength and tone, mentation intact and speech normal  PSYCH: mentation appears normal, affect normal/bright         10/24/2024   Mini Cog   Clock Draw Score 2 Normal   3 Item Recall 1 object recalled   Mini Cog Total Score 3                 Signed Electronically by: Giovanna Parrish MD    "

## 2024-10-25 LAB
ALBUMIN SERPL BCG-MCNC: 4.1 G/DL (ref 3.5–5.2)
ALP SERPL-CCNC: 62 U/L (ref 40–150)
ALT SERPL W P-5'-P-CCNC: 12 U/L (ref 0–50)
ANION GAP SERPL CALCULATED.3IONS-SCNC: 11 MMOL/L (ref 7–15)
AST SERPL W P-5'-P-CCNC: 20 U/L (ref 0–45)
BILIRUB SERPL-MCNC: 0.6 MG/DL
BUN SERPL-MCNC: 17.7 MG/DL (ref 8–23)
CALCIUM SERPL-MCNC: 9.6 MG/DL (ref 8.8–10.4)
CHLORIDE SERPL-SCNC: 105 MMOL/L (ref 98–107)
CHOLEST SERPL-MCNC: 199 MG/DL
CREAT SERPL-MCNC: 0.95 MG/DL (ref 0.51–0.95)
EGFRCR SERPLBLD CKD-EPI 2021: 63 ML/MIN/1.73M2
FASTING STATUS PATIENT QL REPORTED: YES
FASTING STATUS PATIENT QL REPORTED: YES
FERRITIN SERPL-MCNC: 104 NG/ML (ref 11–328)
GLUCOSE SERPL-MCNC: 84 MG/DL (ref 70–99)
HCO3 SERPL-SCNC: 25 MMOL/L (ref 22–29)
HCV AB SERPL QL IA: NONREACTIVE
HDLC SERPL-MCNC: 66 MG/DL
LDLC SERPL CALC-MCNC: 115 MG/DL
NONHDLC SERPL-MCNC: 133 MG/DL
POTASSIUM SERPL-SCNC: 4 MMOL/L (ref 3.4–5.3)
PROT SERPL-MCNC: 6.8 G/DL (ref 6.4–8.3)
SODIUM SERPL-SCNC: 141 MMOL/L (ref 135–145)
TRIGL SERPL-MCNC: 91 MG/DL
TSH SERPL DL<=0.005 MIU/L-ACNC: 1.59 UIU/ML (ref 0.3–4.2)
VIT B12 SERPL-MCNC: 1092 PG/ML (ref 232–1245)
VIT D+METAB SERPL-MCNC: 38 NG/ML (ref 20–50)

## 2024-11-07 ENCOUNTER — NURSE TRIAGE (OUTPATIENT)
Dept: FAMILY MEDICINE | Facility: CLINIC | Age: 74
End: 2024-11-07
Payer: COMMERCIAL

## 2024-11-07 NOTE — TELEPHONE ENCOUNTER
In addition to the call. I sent the patient a response in [employer] asking for her to call our nurse line for triage.    Jesus Washburn RN     [facility]

## 2024-11-07 NOTE — TELEPHONE ENCOUNTER
"Called patient to triage Mychart message below. LMTCB    Thomas Cervantes RN      Dr Heredia  I've had a numbness & cold feeling in my left foot since about 7pm last night - also like my foot is \"asleep\"     Do you have any recommendations as to what I should do about this?      Thanks so much!     Lauren       "

## 2024-11-12 NOTE — TELEPHONE ENCOUNTER
Spoke with Dr. Cueto in clinic. Not urgent as patient is asymptomatic at this time. Defer to Dr. Parrish once she has time to review.    Jesus Washburn RN     [facility]

## 2024-11-12 NOTE — TELEPHONE ENCOUNTER
Nurse Triage SBAR    Is this a 2nd Level Triage? YES, LICENSED PRACTITIONER REVIEW IS REQUIRED    Situation: On 11/6 patient experienced 24 hours of foot coolness/numbness    Background:   Past Medical History:   Diagnosis Date    Gallstones     Ulcerative (chronic) enterocolitis (H)          Assessment: On 11/6 patient experienced 24 hours of foot coolness/numbness. Symptoms are completely resolved now. Patient never experienced other neurological symptoms such as blurred vision, slurred speech or extremity weakness. Foot was not discolored, patient did note some slight warmth in the area, but no redness. Currently all symptoms are resolved    Patient also reports that she experiences occasional tingling in her toes in the morning when she wakes up. This has been occurring for the past 3-4 years.    Protocol Recommended Disposition:   See in Office Within 3 Days    Recommendation: Disposition is to see in office within 3 days. Patient was not scheduled for appointment as her symptoms have resolved. Patient will call us if symptoms recur or if she develops new symptoms. **If no scheduling necessary no need to call patient back**    Routed to provider    Does the patient meet one of the following criteria for ADS visit consideration? 16+ years old, with an [facility] PCP     TIP  Providers, please consider if this condition is appropriate for management at one of our Acute and Diagnostic Services sites.     If patient is a good candidate, please use dotphrase <dot>triageresponse and select Refer to ADS to document.     Reason for Disposition   Tingling in feet and new or increased    Protocols used: Foot Pain-A-[place]

## 2024-11-13 NOTE — TELEPHONE ENCOUNTER
If symptoms have resolved, I think it is ok to monitor for now.  Would recommend being seen if starts happening again

## 2024-12-02 ENCOUNTER — HOSPITAL ENCOUNTER (OUTPATIENT)
Facility: AMBULATORY SURGERY CENTER | Age: 74
Discharge: HOME | End: 2024-12-02
Attending: COLON & RECTAL SURGERY
Payer: COMMERCIAL

## 2024-12-02 VITALS
RESPIRATION RATE: 16 BRPM | OXYGEN SATURATION: 99 % | DIASTOLIC BLOOD PRESSURE: 77 MMHG | SYSTOLIC BLOOD PRESSURE: 148 MMHG | TEMPERATURE: 96.8 F

## 2024-12-02 LAB — FLEXIBLE SIGMOIDOSCOPY: NORMAL

## 2024-12-02 RX ORDER — LIDOCAINE 40 MG/G
CREAM TOPICAL
Status: CANCELLED | OUTPATIENT
Start: 2024-12-02

## 2024-12-02 NOTE — DISCHARGE INSTRUCTIONS
Discharge Instructions:    Take you medications as directed and follow up with you primary provider as scheduled.   You should expect to pass air from your rectum after the procedure.   Follow these care guidelines during your recovery for the next 24 hours.   If you have any questions or concerns please contact the provider that performed your procedure.     You were given medicine for sedation:  You have been given medicines during your procedure that might make you sleepy and weak. To prevent problems:    *Rest for the rest of the day after you are home. You should be back to your normal activity tomorrow.  *For the next 24 hours:   -Do not drink alcoholic beverages.   -Do not make any important decisions or sign any legal forms.   -Do not work around machinery or power equipment.     The medicines used for sedation may make you feel nauseated.   *Start with clear liquids, such as tea, jell-o, broth and ginger ale. As you feel better you may add soft foods such as pudding and ice cream.   *When you no longer feel nauseated, you may try your normal diet.     You should be back to eating your normal after 24 hours.     Call if you have any of these problems:  *Fever of 101 degree F or 38 degrees C  *Bleeding from the rectum  *Black stool or blood in your bowel movements  *Nausea with vomiting that does not ease after a few hours.  *Abdominal pain or bloating  *Fainting    If you have any questions or concerns regarding your procedure please contact IDRIS Peterson, her office number is [phone]

## 2024-12-02 NOTE — H&P
Colon & Rectal Surgery Pre-procedure H&P    12/2/2024     Primary Care Physician     Chief Complaint/Reason for Procedure:             surveillance    History and Physical:   Lauren Dhaliwal is a 73 year old female presenting for flexible sigmoidoscopy  for surveillance purposes. She has a history of TPC low rectal stump.       Past Medical History   I have reviewed this patient's medical history and updated it with pertinent information if needed.   Past Medical History:   Diagnosis Date    Anemia     Arthritis     fore finger and middle finger and r knee    Gallstones     History of blood transfusion     when first diagnosed with ulcerative colitis    Irregular heart beat     Ulcerative (chronic) enterocolitis (H)        Past Surgical History   I have reviewed this patient's surgical history and updated it with pertinent information if needed.  Past Surgical History:   Procedure Laterality Date    ABDOMINOPERINEAL PROCTOCOLECTOMY N/A 2/13/2017    Procedure: LAPAROSCOPIC ASSISTED TOTAL PROCTOCOLECTOMY WITH ILEOSTOMY, AND PROCYOSCOPY;  Surgeon: Krishna Christensen MD;  Location: [facility];  Service:     MAMMOPLASTY AUGMENTATION  1979    OTHER SURGICAL HISTORY      [id number] with colostomy    PICC  2/13/2017         TONSILLECTOMY         Allergies:    Allergies   Allergen Reactions    Pcn [Penicillins]     Albuterol Palpitations    Clindamycin Rash    Enoxaparin Itching and Rash    Heparin Itching and Rash    Infliximab Itching and Rash    Metronidazole Rash         Prior to Admission Medications   Cannot display prior to admission medications because the patient has not been admitted in this contact.     Allergies   Allergies   Allergen Reactions    Pcn [Penicillins]     Albuterol Palpitations    Clindamycin Rash    Enoxaparin Itching and Rash    Heparin Itching and Rash    Infliximab Itching and Rash    Metronidazole Rash       Social History   I have reviewed this patient's social history and updated  it with pertinent information if needed. Lauren Dhaliwal  reports that she has never smoked. She has never used smokeless tobacco. She reports that she does not drink alcohol and does not use drugs.    Family History   I have reviewed this patient's family history and updated it with pertinent information if needed.   Family History   Problem Relation Age of Onset    Colon Cancer Mother     Breast Cancer No family hx of        Review of Systems   The 10 point Review of Systems is negative other than noted in the HPI or here.     Physical Exam     Temp src: Temporal BP: (!) 150/71     Resp: 14 SpO2: 100 % O2 Device: None (Room air)    Vital Signs with Ranges  Resp:  [14] 14  BP: (150)/(71) 150/71  SpO2:  [100 %] 100 %  0 lbs 0 oz    Aaox3, nad  Lungs clear  RRR    Assessment/Plan:  Lauren Dhaliwal presents for flexible sigmoidoscopy. Risks and benefits of flexible sigmoidoscopy  discussed in detail and informed consent obtained.      - proceed with flexible sigmoidoscopy.    Meche Rubio MD, MS  [facility]  Office: [phone]  12/2/2024 7:56 AM

## 2024-12-06 ENCOUNTER — TRANSFERRED RECORDS (OUTPATIENT)
Dept: HEALTH INFORMATION MANAGEMENT | Facility: CLINIC | Age: 74
End: 2024-12-06
Payer: COMMERCIAL

## 2025-02-03 ENCOUNTER — MYC REFILL (OUTPATIENT)
Dept: FAMILY MEDICINE | Facility: CLINIC | Age: 75
End: 2025-02-03
Payer: COMMERCIAL

## 2025-02-03 DIAGNOSIS — M81.8 OTHER OSTEOPOROSIS WITHOUT CURRENT PATHOLOGICAL FRACTURE: ICD-10-CM

## 2025-02-05 RX ORDER — ALENDRONATE SODIUM 70 MG/1
TABLET ORAL
Qty: 12 TABLET | Refills: 0 | Status: SHIPPED | OUTPATIENT
Start: 2025-02-05

## 2025-03-17 ENCOUNTER — OFFICE VISIT (OUTPATIENT)
Dept: FAMILY MEDICINE | Facility: CLINIC | Age: 75
End: 2025-03-17
Payer: COMMERCIAL

## 2025-03-17 VITALS
OXYGEN SATURATION: 98 % | HEIGHT: 61 IN | HEART RATE: 85 BPM | SYSTOLIC BLOOD PRESSURE: 110 MMHG | WEIGHT: 144 LBS | BODY MASS INDEX: 27.19 KG/M2 | RESPIRATION RATE: 18 BRPM | TEMPERATURE: 98.3 F | DIASTOLIC BLOOD PRESSURE: 66 MMHG

## 2025-03-17 DIAGNOSIS — Z93.3 COLOSTOMY STATUS (H): ICD-10-CM

## 2025-03-17 DIAGNOSIS — K51.919 ULCERATIVE COLITIS WITH COMPLICATION, UNSPECIFIED LOCATION (H): ICD-10-CM

## 2025-03-17 DIAGNOSIS — R05.1 ACUTE COUGH: ICD-10-CM

## 2025-03-17 DIAGNOSIS — J47.9 BRONCHIECTASIS WITHOUT COMPLICATION (H): Primary | ICD-10-CM

## 2025-03-17 RX ORDER — AZITHROMYCIN 250 MG/1
TABLET, FILM COATED ORAL
Qty: 6 TABLET | Refills: 0 | Status: SHIPPED | OUTPATIENT
Start: 2025-03-17 | End: 2025-03-22

## 2025-03-17 ASSESSMENT — PAIN SCALES - GENERAL: PAINLEVEL_OUTOF10: NO PAIN (0)

## 2025-03-17 NOTE — PROGRESS NOTES
Assessment & Plan     Bronchiectasis without complication (H)  Concern for atypical pneumonia  CXR if symptoms worsen or do not improve in 3 days  Side effects of medication and expected course of condition discussed.  Take with food. Side effects discussed.  Call with worsening symptoms or if no improvement in 3-4 days.  Analgesics for pain with food as needed.  Immunocompromised with IBD  - azithromycin (ZITHROMAX) 250 MG tablet; Take 2 tablets (500 mg) by mouth daily for 1 day, THEN 1 tablet (250 mg) daily for 4 days.    Ulcerative colitis with complication, unspecified location (H)  Continue with specialist    Acute cough    - azithromycin (ZITHROMAX) 250 MG tablet; Take 2 tablets (500 mg) by mouth daily for 1 day, THEN 1 tablet (250 mg) daily for 4 days.    Colostomy status (H)  Continue with specialist      The longitudinal plan of care for the diagnosis(es)/condition(s) as documented were addressed during this visit. Due to the added complexity in care, I will continue to support Lauren in the subsequent management and with ongoing continuity of care.      Sarbjit Aguilar is a 74 year old, presenting for the following health issues:  Illness        3/17/2025     8:00 AM   Additional Questions   Roomed by Chetna ANGELES MA   Accompanied by n/a         3/17/2025     8:00 AM   Patient Reported Additional Medications   Patient reports taking the following new medications No Medications Missing     History of Present Illness       Reason for visit:  Low grade temp for past 8 days  Symptoms include:  Low grade fever - low on energy  Symptom intensity:  Mild  Symptom progression:  Staying the same  Had these symptoms before:  No  What makes it worse:  Keeping too busy with household chores  What makes it better:  Tylenol helps temporarily   She is taking medications regularly.      Acute Illness  Acute illness concerns: fever  Onset/Duration: 2 weeks  Symptoms:  Fever: YES- highest 100.1  Chills/Sweats: YES- a bit  "of chills  Headache (location?): YES- slightly, front of head  Sinus Pressure: No  Conjunctivitis:  No  Ear Pain: no  Rhinorrhea: No  Congestion: No  Sore Throat: No  Cough: YES-non-productive, barking, waxing and waning over time  Wheeze: No  Decreased Appetite: No  Nausea: No  Vomiting: No  Diarrhea: No  Dysuria/Freq.: No  Dysuria or Hematuria: No  Fatigue/Achiness: YES  Sick/Strep Exposure: No  Therapies tried and outcome: tylenol for the fever. Has not taken for 4 days        New patient to me:      Had pneumonia in 2002.   Has UC and ostomy.  Is on fosamax. Previously on remicade which damaged her lungs per patient. Started feeling low grade temp 2 weeks ago. Feels run down and achey. Feels like when she had pneumonia before. Has had mild cough for 2 weeks. Dry. No nasal congestion or drainage. No ear pain or sore throat. Appetite is normal. Staying hydrated. Oxygen is 98 percent. No sob.   H/o bronchiectasis. Denies wet cough. No chest pain. Had palpitations with albuterol previously.         Objective    /66   Pulse 85   Temp 98.3  F (36.8  C) (Oral)   Resp 18   Ht 1.56 m (5' 1.42\")   Wt 65.3 kg (144 lb)   LMP  (LMP Unknown)   SpO2 98%   BMI 26.84 kg/m    Body mass index is 26.84 kg/m .  Physical Exam       GENERAL:  No acute distress.  Interacts appropriately.  Breathing without difficulty.  Alert.  HEENT:  Tympanic membranes intact without effusion or erythema.  Oral mucosa moist. Posterior pharynx has no erythema.  Posterior pharynx has no exudate. AMNOLIST: No edema.  Uvula midline.   NECK:  Soft and supple.  with tenderness.  AMNOLIST: No  anterior cervical lymphadenopathy.  Normal range of motion.    CARDIAC:   Regular rate and rhythm.  No murmurs, rubs, or gallops.   PULMONARY: Clear to auscultation bilaterally.  No  wheezes, crackles, or rhonchi.  Normal air exchange/breath sounds.  No use of accessory muscles.    PSYCH: Normal affect.  SKIN: No rashes.      Signed Electronically by: Giovanna" Laurel Cota PA-C

## 2025-03-23 ENCOUNTER — MYC MEDICAL ADVICE (OUTPATIENT)
Dept: FAMILY MEDICINE | Facility: CLINIC | Age: 75
End: 2025-03-23
Payer: COMMERCIAL

## 2025-03-26 ENCOUNTER — ANCILLARY PROCEDURE (OUTPATIENT)
Dept: GENERAL RADIOLOGY | Facility: CLINIC | Age: 75
End: 2025-03-26
Attending: FAMILY MEDICINE
Payer: COMMERCIAL

## 2025-03-26 ENCOUNTER — OFFICE VISIT (OUTPATIENT)
Dept: FAMILY MEDICINE | Facility: CLINIC | Age: 75
End: 2025-03-26
Payer: COMMERCIAL

## 2025-03-26 VITALS
BODY MASS INDEX: 26.84 KG/M2 | HEIGHT: 61 IN | RESPIRATION RATE: 16 BRPM | SYSTOLIC BLOOD PRESSURE: 116 MMHG | TEMPERATURE: 97.6 F | OXYGEN SATURATION: 97 % | HEART RATE: 76 BPM | DIASTOLIC BLOOD PRESSURE: 68 MMHG

## 2025-03-26 DIAGNOSIS — R05.2 SUBACUTE COUGH: ICD-10-CM

## 2025-03-26 DIAGNOSIS — R05.2 SUBACUTE COUGH: Primary | ICD-10-CM

## 2025-03-26 DIAGNOSIS — J47.9 BRONCHIECTASIS WITHOUT COMPLICATION (H): ICD-10-CM

## 2025-03-26 PROCEDURE — 99214 OFFICE O/P EST MOD 30 MIN: CPT | Performed by: FAMILY MEDICINE

## 2025-03-26 PROCEDURE — 3074F SYST BP LT 130 MM HG: CPT | Performed by: FAMILY MEDICINE

## 2025-03-26 PROCEDURE — G2211 COMPLEX E/M VISIT ADD ON: HCPCS | Performed by: FAMILY MEDICINE

## 2025-03-26 PROCEDURE — 3078F DIAST BP <80 MM HG: CPT | Performed by: FAMILY MEDICINE

## 2025-03-26 PROCEDURE — 71046 X-RAY EXAM CHEST 2 VIEWS: CPT | Mod: TC | Performed by: RADIOLOGY

## 2025-03-26 NOTE — PROGRESS NOTES
Assessment/ Plan     1. Subacute cough (Primary)  Lauren comes in today for evaluation.  It sounds like she likely had a viral process about 3 and half weeks ago, potentially influenza.  She now just has low energy and a mild cough.  Chest x-ray is normal so I do not think any more antibiotics are warranted at this time.  I discussed with her we consider a true fever over 100.5.  Would have her continue to push fluids and rest.  Follow-up if things are not improving in another several weeks or if things are worsening.  - XR Chest 2 Views; Future      Subjective:      Lauren Dhaliwal is a 74 year old female who presents for follow-up of cough and low energy.  She states her symptoms started about 3 and half weeks ago.  At that time she had a fever up to 100, body aches, and a cough.  She states that the fever has come down to the 98-99 range.  She states that she does not feel good when her temperatures at 98, that her normal is 97.  I discussed with her we do not really consider that a fever until its over 100.5.  She was seen at another clinic and was given a course of azithromycin which really did not seem to change things.  She still occasionally gets a cough but really her main concern is just low energy.  She feels like she is drinking enough fluids.  In 2019 she did have bronchiectasis and saw pulmonary.  She had been on inhalers for a period of time and several courses of antibiotics.  She had been on Remicade at that time for her ulcerative colitis.  She has now had a colectomy.    The longitudinal plan of care for the diagnosis(es)/condition(s) as documented were addressed during this visit. Due to the added complexity in care, I will continue to support Lauren in the subsequent management and with ongoing continuity of care.     Relevant past medical, family, surgical, and social history reviewed with patient, unless noted in HPI, not pertinent for this visit.  Medications were discussed and reconciled.  "  Review of Systems   A 12 point comprehensive review of systems was negative except as noted.      Current Outpatient Medications   Medication Sig Dispense Refill    alendronate (FOSAMAX) 70 MG tablet TAKE 1 TABLET BY MOUTH ONCE A WEEK IN THE MORNING WITH A FULL GLASS OF WATER 30 MINUTES BEFORE THE FIRST MEAL BEVERAGE OR MEDICATION OF THE 12 tablet 0         Objective:     /68   Pulse 76   Temp 97.6  F (36.4  C)   Resp 16   Ht 1.56 m (5' 1.42\")   LMP  (LMP Unknown)   SpO2 97%   BMI 26.84 kg/m      Body mass index is 26.84 kg/m .       General appearance: alert, appears stated age and cooperative  Head: Normocephalic, without obvious abnormality, atraumatic  Eyes: conjunctivae/corneas clear.   Ears: normal TM's and external ear canals both ears  Nose: Nares normal. Septum midline. Mucosa normal. No drainage or sinus tenderness.  Throat: lips, mucosa, and tongue normal; teeth and gums normal  Neck: no adenopathy.  Lungs: clear to auscultation bilaterally  Heart: regular rate and rhythm, S1, S2 normal, no murmur, click, rub or gallop     Chest x-ray: Personally viewed, no infiltrate    No results found for this or any previous visit (from the past week).       This note has been dictated using voice recognition software. Any grammatical or context distortions are unintentional and inherent to the software  "

## 2025-04-03 ENCOUNTER — OFFICE VISIT (OUTPATIENT)
Dept: FAMILY MEDICINE | Facility: CLINIC | Age: 75
End: 2025-04-03
Payer: COMMERCIAL

## 2025-04-03 VITALS
SYSTOLIC BLOOD PRESSURE: 124 MMHG | OXYGEN SATURATION: 98 % | HEART RATE: 68 BPM | TEMPERATURE: 97.9 F | DIASTOLIC BLOOD PRESSURE: 78 MMHG | BODY MASS INDEX: 27.47 KG/M2 | WEIGHT: 147.4 LBS | RESPIRATION RATE: 16 BRPM

## 2025-04-03 DIAGNOSIS — R53.83 OTHER FATIGUE: Primary | ICD-10-CM

## 2025-04-03 ASSESSMENT — PAIN SCALES - GENERAL: PAINLEVEL_OUTOF10: NO PAIN (0)

## 2025-04-03 NOTE — PROGRESS NOTES
"  Assessment & Plan     Other fatigue  Ongoing, had labs in oct through her pcp which were normal  Reassured that her temperatures at home are not considered fevers  She will schedule labs if not continuing to feel better  Continue to stay hydrated  - CBC with platelets and differential; Future  - Comprehensive metabolic panel (BMP + Alb, Alk Phos, ALT, AST, Total. Bili, TP); Future  - TSH with free T4 reflex; Future    I will follow up with lab results if she schedules them    The longitudinal plan of care for the diagnosis(es)/condition(s) as documented were addressed during this visit. Due to the added complexity in care, I will continue to support Lauren in the subsequent management and with ongoing continuity of care.      Sarbjit Aguilar is a 74 year old, presenting for the following health issues:  Illness        4/3/2025    10:15 AM   Additional Questions   Roomed by Chetna ANGELES CMA   Accompanied by n/a         4/3/2025    10:15 AM   Patient Reported Additional Medications   Patient reports taking the following new medications Medications missing from list provider please add: \"AREDS 2 eye focus\"     History of Present Illness       Reason for visit:  Low grade fever for 2 weeks    She eats 0-1 servings of fruits and vegetables daily.She consumes 1 sweetened beverage(s) daily.She exercises with enough effort to increase her heart rate 10 to 19 minutes per day.  She exercises with enough effort to increase her heart rate 3 or less days per week.   She is taking medications regularly.      Acute Illness  Acute illness concerns: low grade fever  Onset/Duration: 1 month  Symptoms:  Fever: YES- pt states that her temperature has been 98.8-99.0   Vertigo: YES, slightly  Chills/Sweats: No  Headache (location?): YES- in mornings , mostly left side of head, front. Takes 1/4 tablet of caffeine and it goes away.  Sinus Pressure: No  Conjunctivitis:  No  Ear Pain: no  Rhinorrhea: No  Congestion: No  Sore Throat: YES- " when it first started, no longer.   Cough: no  Wheeze: No  Decreased Appetite: No  Nausea: No  Vomiting: No  Diarrhea: No  Dysuria/Freq.: No  Dysuria or Hematuria: No  Fatigue/Achiness: YES  Stool is normal, no watery stool consistent with previous sicknesses  Sick/Strep Exposure: No  Therapies tried and outcome: n/a    Pt states that she does feel better than before, but is just worried about the low grade fever. Highest it was at home was 99.   Antibiotics didnt help per patient but she feels time she has got better. Doesn't have much energy/more drained.   Is trying to drink pedialyte which is helping her feel better she would like to wait on labs unless she starts feeing bad again.    Xrays were negative for pneumonia. Cough is gone now.     Objective    /78   Pulse 68   Temp 97.9  F (36.6  C) (Oral)   Resp 16   Wt 66.9 kg (147 lb 6.4 oz)   LMP  (LMP Unknown)   SpO2 98%   BMI 27.47 kg/m    Body mass index is 27.47 kg/m .  Physical Exam   GENERAL: alert and no distress  HENT: ear canals and TM's normal, oropharynx clear, and oral mucous membranes moist  NECK: no adenopathy, no asymmetry, masses, or scars  RESP: lungs clear to auscultation - no rales, rhonchi or wheezes  CV: regular rate and rhythm, normal S1 S2, no S3 or S4, no murmur, click or rub, no peripheral edema  MS: no gross musculoskeletal defects noted, no edema  PSYCH: mentation appears normal, affect normal/bright            Signed Electronically by: Giovanna Cota PA-C

## 2025-04-18 ENCOUNTER — MYC MEDICAL ADVICE (OUTPATIENT)
Dept: FAMILY MEDICINE | Facility: CLINIC | Age: 75
End: 2025-04-18
Payer: COMMERCIAL

## 2025-04-18 DIAGNOSIS — Z93.3 COLOSTOMY STATUS (H): Primary | ICD-10-CM

## 2025-04-21 NOTE — TELEPHONE ENCOUNTER
Incoming call from a staff member from [facility], sister location to [facility]    They state they received the ostomy DME order from this morning and everything looks good however they are requesting a reorder with the addition of protective barrier rings in the supply section of the order they report that they have been supplying Marigo with these rings for quite some time and do actually need a prescription in order to continue doing this.    (The ring is a little pad that goes around the stoma and provides a better surface for the ostomy pouch to adhere to and protects the skin)    I pended the entire ostomy order with the addition of these rings.    Once signed fax for [facility]: [phone]

## 2025-04-22 NOTE — TELEPHONE ENCOUNTER
Updated Ostomy supply order faxed to [facility].    Jesus Washburn RN     [facility]

## 2025-05-08 ENCOUNTER — NURSE TRIAGE (OUTPATIENT)
Dept: FAMILY MEDICINE | Facility: CLINIC | Age: 75
End: 2025-05-08

## 2025-05-08 DIAGNOSIS — U07.1 INFECTION DUE TO 2019 NOVEL CORONAVIRUS: Primary | ICD-10-CM

## 2025-05-08 NOTE — TELEPHONE ENCOUNTER
"Nurse Triage SBAR    Is this a 2nd Level Triage? YES, LICENSED PRACTITIONER REVIEW IS REQUIRED    Situation:  + Covid test 5/7/25.    Background:  74 year old patient with + covid test. Known exposure to covid from  in same house hold. Hx of bronchiectasis.     Assessment:  Patient reports very mild symptoms. States \"I don't really feel bad, it's just like I'm getting a cold.\" Patient can not elaborate further. Denies sore throat, headache, or chest congestion. Denies chest pain or difficulty breathing.     Protocol Recommended Disposition:   Discuss With PCP And Callback By Nurse Within 1 Hour    Recommendation:  Provided care advice per protocol and RN paxlovid protocol completed.       Does the patient meet one of the following criteria for ADS visit consideration? 16+ years old, with an [facility] PCP     TIP  Providers, please consider if this condition is appropriate for management at one of our Acute and Diagnostic Services sites.     If patient is a good candidate, please use dotphrase <dot>triageresponse and select Refer to ADS to document.      RN COVID TREATMENT VISIT  05/08/25      The patient has been triaged and does not require a higher level of care.    Lauren Dhaliwal  74 year old  Current weight? 140    Has the patient been seen by a primary care or specialty provider at a [facility] clinic within the past three years? Yes.   Have you been in close proximity to/do you have a known exposure to a person with a confirmed case of influenza? No.     General treatment eligibility:  Date of positive COVID test (PCR or at home)?  5/8/25    Are you or have you been hospitalized for this COVID-19 infection? No.   Have you received monoclonal antibodies or antiviral treatment for COVID-19 since this positive test? No.   Do you have any of the following conditions that place you at risk of being very sick from COVID-19?   Yes, patient has at least one high risk condition as noted above. "     Current COVID symptoms:   Yes. Patient has at least one symptom as selected.     How many days since symptoms started? 5 days or less. Established patient, 12 years or older weighing at least 88.2 lbs, who has symptoms that started in the past 5 days, has not been hospitalized nor received treatment already, and is at risk for being very sick from COVID-19.     Treatment eligibility by RN:  Are you currently pregnant or nursing? No  Do you have a clinically significant hypersensitivity to nirmatrelvir or ritonavir, or toxic epidermal necrolysis (TEN) or Conde-Jaylon Syndrome? No  Do you have a history of hepatitis, any hepatic impairment on the Problem List (such as Child-Valencia Class C, cirrhosis, fatty liver disease, alcoholic liver disease), or was the last liver lab (hepatic panel, ALT, AST, ALK Phos, bilirubin) elevated in the past 6 months? No  Do you have any history of severe renal impairment (eGFR < 30mL/min)? No    Is patient eligible to continue? Yes, patient meets all eligibility requirements for the RN COVID treatment (as denoted by all no responses above).     Current Outpatient Medications   Medication Sig Dispense Refill    alendronate (FOSAMAX) 70 MG tablet TAKE 1 TABLET BY MOUTH ONCE A WEEK IN THE MORNING WITH A FULL GLASS OF WATER 30 MINUTES BEFORE THE FIRST MEAL BEVERAGE OR MEDICATION OF THE 12 tablet 0       Medications from List 1 of the standing order (on medications that exclude the use of Paxlovid) that patient is taking: NONE.   Is patient taking any meds from List 1? No.   Medications from List 2 of the standing order (on meds that provider needs to adjust) that patient is taking: NONE. Is patient on any of the meds from List 2? No.   Medications from List 3 of standing order (on meds that a RN needs to adjust) that patient is taking: NONE. Is patient on any meds from List 3? No.     Paxlovid has an approximate 90% reduction in hospitalization. Paxlovid can possibly cause altered  sense of taste, diarrhea (loose, watery stools), high blood pressure, muscle aches.     Would patient like a Paxlovid prescription?   Yes.   Lab Results   Component Value Date    GFRESTIMATED 63 10/24/2024       Was last eGFR reduced? No, eGFR 60 or greater/ No Result on record. Patient can receive the normal renal function dose. Paxlovid Rx sent to [place] [facility]   [facility] in [place].     Temporary change to home medications: None    All medication adjustments (holds, etc) were discussed with the patient and patient was asked to repeat back (teachback) their med adjustment.  Did patient understand med adjustment? No medication adjustments needed.         Reviewed the following instructions with the patient:    Paxlovid (nimatrelvir and ritonavir)    How it works  Two medicines (nirmatrelvir and ritonavir) are taken together. They stop the virus from growing. Less amount of virus is easier for your body to fight.    How to take  Medicine comes in a daily container with both medicine tablets. Take by mouth twice daily (once in the morning, once at night) for 5 days.  The number of tablets to take varies by patient.  Don't chew or break capsules. Swallow whole.    When to take  Take as soon as possible after positive COVID-19 test result, and within 5 days of your first symptoms.    Possible side effects  Can cause altered sense of taste, diarrhea (loose, watery stools), high blood pressure, muscle aches.    [place], RN       Reason for Disposition   HIGH RISK patient (e.g., weak immune system, age > 64 years, obesity with BMI of 30 or higher, pregnant, chronic lung disease or other chronic medical condition) and COVID symptoms (e.g., cough, fever)  (Exceptions: Already seen by doctor or NP/PA and no new or worsening symptoms.)    Additional Information   Negative: SEVERE difficulty breathing (e.g., struggling for each breath, speaks in single words)   Negative: Difficult to awaken or acting confused  "(e.g., disoriented, slurred speech)   Negative: Bluish (or gray) lips or face now   Negative: Shock suspected (e.g., cold/pale/clammy skin, too weak to stand, low BP, rapid pulse)   Negative: Sounds like a life-threatening emergency to the triager   Negative: Diagnosed or suspected COVID-19 and symptoms lasting 3 or more weeks   Negative: COVID-19 exposure and no symptoms   Negative: COVID-19 vaccine reaction suspected (e.g., fever, headache, muscle aches) occurring 1 to 3 days after getting vaccine   Negative: COVID-19 vaccine, questions about   Negative: Lives with someone known to have influenza (flu test positive) and flu-like symptoms (e.g., cough, runny nose, sore throat, SOB; with or without fever)   Negative: Possible COVID-19 symptoms and triager concerned about severity of symptoms or other causes   Negative: COVID-19 and breastfeeding, questions about   Negative: SEVERE or constant chest pain or pressure  (Exception: Mild central chest pain, present only when coughing.)   Negative: MODERATE difficulty breathing (e.g., speaks in phrases, SOB even at rest, pulse 100-120)   Negative: Headache and stiff neck (can't touch chin to chest)   Negative: Oxygen level (e.g., pulse oximetry) 90% or lower   Negative: Chest pain or pressure  (Exception: MILD central chest pain, present only when coughing.)   Negative: Drinking very little and dehydration suspected (e.g., no urine > 12 hours, very dry mouth, very lightheaded)   Negative: Patient sounds very sick or weak to the triager   Negative: MILD difficulty breathing (e.g., minimal/no SOB at rest, SOB with walking, pulse <100)   Negative: Fever > 103 F (39.4 C)   Negative: Fever > 101 F (38.3 C) and over 60 years of age   Negative: Fever > 100.0 F (37.8 C) and bedridden (e.g., CVA, chronic illness, recovering from surgery)    Answer Assessment - Initial Assessment Questions  1. COVID-19 DIAGNOSIS: \"How do you know that you have COVID?\" (e.g., positive lab test or " "self-test, diagnosed by doctor or NP/PA, symptoms after exposure).      5/8/25  2. COVID-19 EXPOSURE: \"Was there any known exposure to COVID before the symptoms began?\" CDC Definition of close contact: within 6 feet (2 meters) for a total of 15 minutes or more over a 24-hour period.       Yes my  has it. He tested positive   3. ONSET: \"When did the COVID-19 symptoms start?\"       Symptoms started last night.   4. WORST SYMPTOM: \"What is your worst symptom?\" (e.g., cough, fever, shortness of breath, muscle aches)      Right now I feel like I'm having the beginning of a cold.   5. COUGH: \"Do you have a cough?\" If Yes, ask: \"How bad is the cough?\"        No   6. FEVER: \"Do you have a fever?\" If Yes, ask: \"What is your temperature, how was it measured, and when did it start?\"      No   7. RESPIRATORY STATUS: \"Describe your breathing?\" (e.g., normal; shortness of breath, wheezing, unable to speak)       Normal.   8. BETTER-SAME-WORSE: \"Are you getting better, staying the same or getting worse compared to yesterday?\"  If getting worse, ask, \"In what way?\"      worse  9. OTHER SYMPTOMS: \"Do you have any other symptoms?\"  (e.g., chills, fatigue, headache, loss of smell or taste, muscle pain, sore throat)      None   10. HIGH RISK DISEASE: \"Do you have any chronic medical problems?\" (e.g., asthma, heart or lung disease, weak immune system, obesity, etc.)        bronchiectasis  11. VACCINE: \"Have you had the COVID-19 vaccine?\" If Yes, ask: \"Which one, how many shots, when did you get it?\"        10/24/24  12. PREGNANCY: \"Is there any chance you are pregnant?\" \"When was your last menstrual period?\"        NA  13. O2 SATURATION MONITOR:  \"Do you use an oxygen saturation monitor (pulse oximeter) at home?\" If Yes, ask \"What is your reading (oxygen level) today?\" \"What is your usual oxygen saturation reading?\" (e.g., 95%)        No    Protocols used: Coronavirus (COVID-19) Diagnosed or [id number]    "

## 2025-05-20 ENCOUNTER — E-VISIT (OUTPATIENT)
Dept: FAMILY MEDICINE | Facility: CLINIC | Age: 75
End: 2025-05-20
Payer: COMMERCIAL
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
## 2025-05-20 RX ORDER — DOXYCYCLINE HYCLATE 100 MG
100 TABLET ORAL 2 TIMES DAILY
Qty: 20 TABLET | Refills: 0 | Status: SHIPPED | OUTPATIENT
Start: 2025-05-20 | End: 2025-05-30

## 2025-05-22 ENCOUNTER — MYC REFILL (OUTPATIENT)
Dept: FAMILY MEDICINE | Facility: CLINIC | Age: 75
End: 2025-05-22
Payer: COMMERCIAL

## 2025-05-22 DIAGNOSIS — M81.8 OTHER OSTEOPOROSIS WITHOUT CURRENT PATHOLOGICAL FRACTURE: ICD-10-CM

## 2025-05-22 RX ORDER — ALENDRONATE SODIUM 70 MG/1
TABLET ORAL
Qty: 12 TABLET | Refills: 0 | Status: SHIPPED | OUTPATIENT
Start: 2025-05-22

## 2025-06-23 ENCOUNTER — RESULTS FOLLOW-UP (OUTPATIENT)
Dept: FAMILY MEDICINE | Facility: CLINIC | Age: 75
End: 2025-06-23

## 2025-06-23 ENCOUNTER — OFFICE VISIT (OUTPATIENT)
Dept: FAMILY MEDICINE | Facility: CLINIC | Age: 75
End: 2025-06-23
Payer: COMMERCIAL

## 2025-06-23 VITALS
WEIGHT: 141.2 LBS | OXYGEN SATURATION: 98 % | SYSTOLIC BLOOD PRESSURE: 112 MMHG | RESPIRATION RATE: 20 BRPM | HEART RATE: 76 BPM | BODY MASS INDEX: 25.98 KG/M2 | TEMPERATURE: 97.4 F | HEIGHT: 62 IN | DIASTOLIC BLOOD PRESSURE: 74 MMHG

## 2025-06-23 DIAGNOSIS — R11.0 NAUSEA: Primary | ICD-10-CM

## 2025-06-23 LAB
ERYTHROCYTE [DISTWIDTH] IN BLOOD BY AUTOMATED COUNT: 13.6 % (ref 10–15)
HCT VFR BLD AUTO: 38 % (ref 35–47)
HGB BLD-MCNC: 12.4 G/DL (ref 11.7–15.7)
MCH RBC QN AUTO: 29.1 PG (ref 26.5–33)
MCHC RBC AUTO-ENTMCNC: 32.6 G/DL (ref 31.5–36.5)
MCV RBC AUTO: 89 FL (ref 78–100)
PLATELET # BLD AUTO: 185 10E3/UL (ref 150–450)
RBC # BLD AUTO: 4.26 10E6/UL (ref 3.8–5.2)
WBC # BLD AUTO: 5.3 10E3/UL (ref 4–11)

## 2025-06-23 PROCEDURE — 85027 COMPLETE CBC AUTOMATED: CPT | Performed by: PHYSICIAN ASSISTANT

## 2025-06-23 PROCEDURE — 80053 COMPREHEN METABOLIC PANEL: CPT | Performed by: PHYSICIAN ASSISTANT

## 2025-06-23 PROCEDURE — 36415 COLL VENOUS BLD VENIPUNCTURE: CPT | Performed by: PHYSICIAN ASSISTANT

## 2025-06-23 PROCEDURE — 83690 ASSAY OF LIPASE: CPT | Performed by: PHYSICIAN ASSISTANT

## 2025-06-23 RX ORDER — ONDANSETRON 4 MG/1
4 TABLET, ORALLY DISINTEGRATING ORAL EVERY 8 HOURS PRN
Qty: 8 TABLET | Refills: 0 | Status: SHIPPED | OUTPATIENT
Start: 2025-06-23

## 2025-06-23 ASSESSMENT — ENCOUNTER SYMPTOMS: DIARRHEA: 1

## 2025-06-23 ASSESSMENT — PAIN SCALES - GENERAL: PAINLEVEL_OUTOF10: NO PAIN (0)

## 2025-06-23 NOTE — PROGRESS NOTES
"  Assessment & Plan   Problem List Items Addressed This Visit    None  Visit Diagnoses         Need for vaccination                      BMI  Estimated body mass index is 26.25 kg/m  as calculated from the following:    Height as of this encounter: 1.562 m (5' 1.5\").    Weight as of this encounter: 64 kg (141 lb 3.2 oz).     Follow-up  No follow-ups on file.    Sarbjit Aguilar is a 74 year old, presenting for the following health issues:  Vertigo (Nausea) and Diarrhea (Dehydrated )        6/23/2025     2:18 PM   Additional Questions   Roomed by MYNOR Puri   Accompanied by n/a         6/23/2025     2:18 PM   Patient Reported Additional Medications   Patient reports taking the following new medications n/a     Diarrhea    History of Present Illness       Reason for visit:  Flu like symptoms feel dehydrated  Symptom onset:  1-3 days ago  Symptoms include:  Flu like - dehydrated  Symptom intensity:  Moderate  Symptom progression:  Staying the same  Had these symptoms before:  No  What makes it worse:  Heat  What makes it better:  Cooler air   She is taking medications regularly.          Review of Systems  Constitutional, HEENT, cardiovascular, pulmonary, gi and gu systems are negative, except as otherwise noted.      Objective    /74   Pulse 107   Temp 97.4  F (36.3  C) (Temporal)   Resp 20   Ht 1.562 m (5' 1.5\")   Wt 64 kg (141 lb 3.2 oz)   LMP  (LMP Unknown)   SpO2 98%   BMI 26.25 kg/m    Body mass index is 26.25 kg/m .  Physical Exam     EKG - Reviewed and interpreted by me {:[id number]}  No results found for this or any previous visit (from the past 24 hours).        Signed Electronically by: TAMARA Mireles  {Email feedback regarding this note to [email or web address]   :[id number]}  "

## 2025-06-23 NOTE — PATIENT INSTRUCTIONS
Marni Aguilar,    Thank you for allowing [facility] to manage your care.    I am unsure of the cause of your symptoms, but your exam is reassuring. We will see what our workup shows.     If you develop worsening/changing symptoms at any time such as abdominal or chest pain, vomiting, bloody/black stool, shortness of breath, worsening dizziness or other worrisome symptoms, please be seen in urgent care or call 911/go to the emergency department for evaluation as we discussed.    I ordered some lab work. Please go to the laboratory to get your studies.    I sent your prescriptions to your pharmacy. Drink 8-10 glasses of fluid daily to stay well-hydrated.    Please allow 1-2 business days for our office to contact you in regards to your laboratory/radiological studies.  If not done so, I encourage you to login into [email or web address] to review your results as well.     If you have any questions or concerns, please feel free to call us at [phone]    Sincerely,    Alf Garzon PA-C    Did you know?      You can schedule a video visit for follow-up appointments as well as future appointments for certain conditions.  Please see the below link.     [email or web address]    If you have not already done so,  I encourage you to sign up for [employer] ([email or web address]).  This will allow you to review your results, securely communicate with a provider, and schedule virtual visits as well.

## 2025-06-24 LAB
ALBUMIN SERPL BCG-MCNC: 4.3 G/DL (ref 3.5–5.2)
ALP SERPL-CCNC: 69 U/L (ref 40–150)
ALT SERPL W P-5'-P-CCNC: 14 U/L (ref 0–50)
ANION GAP SERPL CALCULATED.3IONS-SCNC: 11 MMOL/L (ref 7–15)
AST SERPL W P-5'-P-CCNC: 26 U/L (ref 0–45)
BILIRUB SERPL-MCNC: 0.5 MG/DL
BUN SERPL-MCNC: 16.2 MG/DL (ref 8–23)
CALCIUM SERPL-MCNC: 9.7 MG/DL (ref 8.8–10.4)
CHLORIDE SERPL-SCNC: 102 MMOL/L (ref 98–107)
CREAT SERPL-MCNC: 0.81 MG/DL (ref 0.51–0.95)
EGFRCR SERPLBLD CKD-EPI 2021: 75 ML/MIN/1.73M2
GLUCOSE SERPL-MCNC: 98 MG/DL (ref 70–99)
HCO3 SERPL-SCNC: 23 MMOL/L (ref 22–29)
LIPASE SERPL-CCNC: 17 U/L (ref 13–60)
POTASSIUM SERPL-SCNC: 4.2 MMOL/L (ref 3.4–5.3)
PROT SERPL-MCNC: 7 G/DL (ref 6.4–8.3)
SODIUM SERPL-SCNC: 136 MMOL/L (ref 135–145)

## 2025-06-26 ENCOUNTER — HOSPITAL ENCOUNTER (EMERGENCY)
Facility: HOSPITAL | Age: 75
Discharge: HOME | End: 2025-06-26
Attending: FAMILY MEDICINE
Payer: COMMERCIAL

## 2025-06-26 ENCOUNTER — APPOINTMENT (OUTPATIENT)
Dept: CT IMAGING | Facility: HOSPITAL | Age: 75
End: 2025-06-26
Attending: FAMILY MEDICINE
Payer: COMMERCIAL

## 2025-06-26 VITALS
BODY MASS INDEX: 25.68 KG/M2 | DIASTOLIC BLOOD PRESSURE: 76 MMHG | SYSTOLIC BLOOD PRESSURE: 133 MMHG | WEIGHT: 136 LBS | OXYGEN SATURATION: 98 % | HEIGHT: 61 IN | TEMPERATURE: 97.4 F | HEART RATE: 63 BPM | RESPIRATION RATE: 12 BRPM

## 2025-06-26 DIAGNOSIS — R11.0 NAUSEA: ICD-10-CM

## 2025-06-26 DIAGNOSIS — R42 VERTIGO: ICD-10-CM

## 2025-06-26 DIAGNOSIS — R00.2 PALPITATIONS: ICD-10-CM

## 2025-06-26 LAB
ALBUMIN SERPL BCG-MCNC: 4.2 G/DL (ref 3.5–5.2)
ALBUMIN UR-MCNC: NEGATIVE MG/DL
ALP SERPL-CCNC: 72 U/L (ref 40–150)
ALT SERPL W P-5'-P-CCNC: 15 U/L (ref 0–50)
ANION GAP SERPL CALCULATED.3IONS-SCNC: 9 MMOL/L (ref 7–15)
APPEARANCE UR: CLEAR
AST SERPL W P-5'-P-CCNC: 21 U/L (ref 0–45)
BACTERIA #/AREA URNS HPF: ABNORMAL /HPF
BASE EXCESS BLDV CALC-SCNC: 0.7 MMOL/L (ref -3–3)
BASOPHILS # BLD AUTO: 0 10E3/UL (ref 0–0.2)
BASOPHILS NFR BLD AUTO: 0 %
BILIRUB DIRECT SERPL-MCNC: 0.2 MG/DL (ref 0–0.3)
BILIRUB SERPL-MCNC: 0.7 MG/DL
BILIRUB UR QL STRIP: NEGATIVE
BUN SERPL-MCNC: 10.2 MG/DL (ref 8–23)
CALCIUM SERPL-MCNC: 9.9 MG/DL (ref 8.8–10.4)
CHLORIDE SERPL-SCNC: 103 MMOL/L (ref 98–107)
COLOR UR AUTO: COLORLESS
CREAT SERPL-MCNC: 0.81 MG/DL (ref 0.51–0.95)
EGFRCR SERPLBLD CKD-EPI 2021: 75 ML/MIN/1.73M2
EOSINOPHIL # BLD AUTO: 0.1 10E3/UL (ref 0–0.7)
EOSINOPHIL NFR BLD AUTO: 1 %
ERYTHROCYTE [DISTWIDTH] IN BLOOD BY AUTOMATED COUNT: 13.6 % (ref 10–15)
GLUCOSE SERPL-MCNC: 96 MG/DL (ref 70–99)
GLUCOSE UR STRIP-MCNC: NEGATIVE MG/DL
HCO3 BLDV-SCNC: 25 MMOL/L (ref 21–28)
HCO3 SERPL-SCNC: 25 MMOL/L (ref 22–29)
HCT VFR BLD AUTO: 36.4 % (ref 35–47)
HGB BLD-MCNC: 12.6 G/DL (ref 11.7–15.7)
HGB UR QL STRIP: NEGATIVE
HOLD SPECIMEN: NORMAL
IMM GRANULOCYTES # BLD: 0 10E3/UL
IMM GRANULOCYTES NFR BLD: 1 %
KETONES UR STRIP-MCNC: NEGATIVE MG/DL
LEUKOCYTE ESTERASE UR QL STRIP: ABNORMAL
LYMPHOCYTES # BLD AUTO: 1.1 10E3/UL (ref 0.8–5.3)
LYMPHOCYTES NFR BLD AUTO: 25 %
MAGNESIUM SERPL-MCNC: 1.9 MG/DL (ref 1.7–2.3)
MCH RBC QN AUTO: 30.1 PG (ref 26.5–33)
MCHC RBC AUTO-ENTMCNC: 34.6 G/DL (ref 31.5–36.5)
MCV RBC AUTO: 87 FL (ref 78–100)
MONOCYTES # BLD AUTO: 0.4 10E3/UL (ref 0–1.3)
MONOCYTES NFR BLD AUTO: 8 %
NEUTROPHILS # BLD AUTO: 2.9 10E3/UL (ref 1.6–8.3)
NEUTROPHILS NFR BLD AUTO: 65 %
NITRATE UR QL: NEGATIVE
NRBC # BLD AUTO: 0 10E3/UL
NRBC BLD AUTO-RTO: 0 /100
NT-PROBNP SERPL-MCNC: 90 PG/ML (ref 0–624)
O2/TOTAL GAS SETTING VFR VENT: 21 %
OXYHGB MFR BLDV: 70 % (ref 70–75)
PCO2 BLDV: 40 MM HG (ref 40–50)
PH BLDV: 7.41 [PH] (ref 7.32–7.43)
PH UR STRIP: 5 [PH] (ref 5–7)
PLATELET # BLD AUTO: 167 10E3/UL (ref 150–450)
PO2 BLDV: 35 MM HG (ref 25–47)
POTASSIUM SERPL-SCNC: 3.8 MMOL/L (ref 3.4–5.3)
PROT SERPL-MCNC: 7 G/DL (ref 6.4–8.3)
RBC # BLD AUTO: 4.19 10E6/UL (ref 3.8–5.2)
RBC URINE: <1 /HPF
SAO2 % BLDV: 70.6 % (ref 70–75)
SODIUM SERPL-SCNC: 137 MMOL/L (ref 135–145)
SP GR UR STRIP: 1 (ref 1–1.03)
SQUAMOUS EPITHELIAL: <1 /HPF
TROPONIN T SERPL HS-MCNC: 8 NG/L
TSH SERPL DL<=0.005 MIU/L-ACNC: 1.27 UIU/ML (ref 0.3–4.2)
UROBILINOGEN UR STRIP-MCNC: NORMAL MG/DL
WBC # BLD AUTO: 4.5 10E3/UL (ref 4–11)
WBC URINE: 6 /HPF

## 2025-06-26 PROCEDURE — 83880 ASSAY OF NATRIURETIC PEPTIDE: CPT | Performed by: FAMILY MEDICINE

## 2025-06-26 PROCEDURE — 81001 URINALYSIS AUTO W/SCOPE: CPT | Performed by: FAMILY MEDICINE

## 2025-06-26 PROCEDURE — 82248 BILIRUBIN DIRECT: CPT | Performed by: FAMILY MEDICINE

## 2025-06-26 PROCEDURE — 93005 ELECTROCARDIOGRAM TRACING: CPT | Performed by: FAMILY MEDICINE

## 2025-06-26 PROCEDURE — 84443 ASSAY THYROID STIM HORMONE: CPT | Performed by: FAMILY MEDICINE

## 2025-06-26 PROCEDURE — 85004 AUTOMATED DIFF WBC COUNT: CPT | Performed by: FAMILY MEDICINE

## 2025-06-26 PROCEDURE — 36415 COLL VENOUS BLD VENIPUNCTURE: CPT | Performed by: FAMILY MEDICINE

## 2025-06-26 PROCEDURE — 70450 CT HEAD/BRAIN W/O DYE: CPT

## 2025-06-26 PROCEDURE — 82805 BLOOD GASES W/O2 SATURATION: CPT | Performed by: FAMILY MEDICINE

## 2025-06-26 PROCEDURE — 250N000011 HC RX IP 250 OP 636: Performed by: FAMILY MEDICINE

## 2025-06-26 PROCEDURE — 84484 ASSAY OF TROPONIN QUANT: CPT | Performed by: FAMILY MEDICINE

## 2025-06-26 PROCEDURE — 83735 ASSAY OF MAGNESIUM: CPT | Performed by: FAMILY MEDICINE

## 2025-06-26 RX ORDER — ONDANSETRON 2 MG/ML
4 INJECTION INTRAMUSCULAR; INTRAVENOUS ONCE
Status: COMPLETED | OUTPATIENT
Start: 2025-06-26 | End: 2025-06-26

## 2025-06-26 RX ORDER — FAMOTIDINE 20 MG/1
20 TABLET, FILM COATED ORAL 2 TIMES DAILY
Qty: 14 TABLET | Refills: 0 | Status: SHIPPED | OUTPATIENT
Start: 2025-06-26 | End: 2025-07-03

## 2025-06-26 ASSESSMENT — ACTIVITIES OF DAILY LIVING (ADL)
ADLS_ACUITY_SCORE: 41

## 2025-06-26 ASSESSMENT — COLUMBIA-SUICIDE SEVERITY RATING SCALE - C-SSRS
2. HAVE YOU ACTUALLY HAD ANY THOUGHTS OF KILLING YOURSELF IN THE PAST MONTH?: NO
6. HAVE YOU EVER DONE ANYTHING, STARTED TO DO ANYTHING, OR PREPARED TO DO ANYTHING TO END YOUR LIFE?: NO
1. IN THE PAST MONTH, HAVE YOU WISHED YOU WERE DEAD OR WISHED YOU COULD GO TO SLEEP AND NOT WAKE UP?: NO

## 2025-06-26 NOTE — ED PROVIDER NOTES
EMERGENCY DEPARTMENT ENCOUNTER      NAME: Lauren Dhaliwal  AGE: 74 year old female  YOB: 1950  MRN: [id number]  EVALUATION DATE & TIME: 6/26/2025  9:58 AM    PCP: Giovanna Parrish    ED PROVIDER: Harshad Jennings M.D.    Chief Complaint   Patient presents with    Nausea       FINAL IMPRESSION:  1. Nausea    2. Vertigo    3. Palpitations        ED COURSE & MEDICAL DECISION MAKING:    Pertinent Labs & Imaging studies independently interpreted by me. (See chart for details)  Reviewed prior office visit from June 23 which was for nausea and lightheadedness, at that time heart rate 107, blood pressure 112/74.  Labs at that time with normal basic metabolic panel, normal comprehensive panel, normal CBC.  ED Course as of 06/26/25 1353   Thu Jun 26, 2025   1018 Patient seen and examined, presents today with nausea, some dizziness, and palpitations.  This been going on for about 5 days with waxing and waning symptoms.  Felt quite poorly yesterday, better today.  Denies chest pain, does have a slight headache.  Was having some vertigo couple days ago but that is improved.  No abdominal pain, has an ostomy and that is functioning properly.  On exam here, patient is vitally stable, no abdominal tenderness, heart is regular, lungs are clear.  Labs are ordered along with head CT given headache, low-grade nausea and dizziness although dizziness is improved.  EKG is reassuring.   1021 EKG:    Independently reviewed and interpreted by me  Performed at: 10:20 AM  Impression: No acute ischemic changes  Rate: 69  Rhythm: Sinus  Axis: Normal  [place] Interval: 160  QRS Interval: 78  QTc Interval: 441  ST Changes: Nonspecific T wave changes  Comparison: February 2017, chronic nonspecific ST changes are present   1106 Labs independently interpreted by me with normal TCH, normal venous blood gas, normal BNP, normal magnesium, troponin 8 which is negative given time of onset of symptoms.  Hepatic panel reassuring.   1108 CT scan of  the head independently interpreted by me without acute findings   1119 Head CT w/o contrast  IMPRESSION:  1.  No intracranial hemorrhage, mass lesions, hydrocephalus or CT evidence for an acute infarct.  2.  Mild diffuse cerebral parenchymal volume loss. Presumed chronic hypertensive/microvascular ischemic white matter changes.   1236 Urinanalysis independently interpreted by me is negative.  No definite etiology for patient's symptoms found today, no evidence for acute infection, electrolyte disturbance, ACS, intracranial pathology.       At the conclusion of the encounter I discussed the results of all of the tests and the disposition. The questions were answered. The patient or family acknowledged understanding and was agreeable with the care plan.     Medical Decision Making  I obtained history from Family Member/Significant Other  Care impacted by anemia, h/o ulcerative colitis  Discharge. I prescribed additional prescription strength medication(s) as charted. I considered admission, but ultimately discharged patient with reassuring emergency department evaluation.    MEDICATIONS GIVEN IN THE EMERGENCY:  Medications   famotidine (PEPCID) injection 20 mg (20 mg Intravenous Not Given 6/26/25 1034)   ondansetron (ZOFRAN) injection 4 mg (4 mg Intravenous Not Given 6/26/25 1034)       NEW PRESCRIPTIONS STARTED AT TODAY'S ER VISIT  Discharge Medication List as of 6/26/2025 12:44 PM        START taking these medications    Details   famotidine (PEPCID) 20 MG tablet Take 1 tablet (20 mg) by mouth 2 times daily for 7 days., Disp-14 tablet, R-0, E-Prescribe             =================================================================    HPI    Patient information was obtained from: The patient, patient's       Lauren Dhaliwal is a 74 year old female with a pertinent history of anemia, ulcerative colitis, and s/p colectomy placement, who presents to this ED via walk-in for evaluation of nausea, some dizziness, and  "palpitations.    The patient reports that she believes that she is experiencing food poisoning after she ate a warm pizza this past Saturday (06/21/2025). Subsequently, she began feeling ill the following day on Sunday morning (06/22/2025). She explains that on the morning of 06/22/2025, she was experiencing dizziness she describes as vertigo, developed mild nausea, felt her heart pounding when she woke up that morning, and also felt dehydrated.    States that she decided to come in today (06/26/2025) because she felt \"terrible\" yesterday (06/25/2025) as she was experiencing nausea, dizziness, feeling very weak and shaky. Today (06/26/2025), she feels a lot better compared to yesterday, and notes that the dizziness is better. She's been taking nausea medications. Denies having any episodes of emesis. She has an ileostomy and reports that her ileostomy bag sometimes gets watery, but explains that this typically happens when she eats and drinks an excessive amount of sugary foods and drinks. She has had no changes in the consistency of the stool output into her ileostomy bag, and she adds that the stool output does not appear dark or unusual to her.    She indicates that she has had an irregular heart beat before. She explains that she has a \"sensitivity to coffee\". She mentions that she did drink a couple of Coca-Jayden. Denies experiencing any chest pain or shortness of breath.    She's been having headaches intermittently, but this is not a new symptom for her. Denies experiencing any vision changes or other additional complaints or concerns at this time.      REVIEW OF SYSTEMS   Review of Systems - Refer to HPI, otherwise negative    PAST MEDICAL HISTORY:  Past Medical History:   Diagnosis Date    Anemia     Arthritis     fore finger and middle finger and r knee    Gallstones     History of blood transfusion     when first diagnosed with ulcerative colitis    Irregular heart beat     Ulcerative (chronic) " enterocolitis (H)        PAST SURGICAL HISTORY:  Past Surgical History:   Procedure Laterality Date    ABDOMEN SURGERY  2017    Illiostomy    ABDOMINOPERINEAL PROCTOCOLECTOMY N/A 02/13/2017    Procedure: LAPAROSCOPIC ASSISTED TOTAL PROCTOCOLECTOMY WITH ILEOSTOMY, AND PROCYOSCOPY;  Surgeon: Krishna Christensen MD;  Location: [facility] [place];  Service:     EYE SURGERY  May 2023    Cataract removal    MAMMOPLASTY AUGMENTATION  01/01/1979    OTHER SURGICAL HISTORY      [id number] with colostomy    PICC  02/13/2017         SIGMOIDOSCOPY FLEXIBLE N/A 12/02/2024    Procedure: SIGMOIDOSCOPY, FLEXIBLE;  Surgeon: Meche Rubio MD;  Location: [facility]    TONSILLECTOMY         CURRENT MEDICATIONS:    No current facility-administered medications for this encounter.     Current Outpatient Medications   Medication Sig Dispense Refill    famotidine (PEPCID) 20 MG tablet Take 1 tablet (20 mg) by mouth 2 times daily for 7 days. 14 tablet 0    alendronate (FOSAMAX) 70 MG tablet TAKE 1 TABLET BY MOUTH ONCE A WEEK IN THE MORNING WITH A FULL GLASS OF WATER 30 MINUTES BEFORE THE FIRST MEAL BEVERAGE OR MEDICATION OF THE 12 tablet 0    ondansetron (ZOFRAN ODT) 4 MG ODT tab Take 1 tablet (4 mg) by mouth every 8 hours as needed for nausea. 8 tablet 0       ALLERGIES:  Allergies   Allergen Reactions    Pcn [Penicillins]     Albuterol Palpitations    Clindamycin Rash    Enoxaparin Itching and Rash    Heparin Itching and Rash    Infliximab Itching and Rash    Metronidazole Rash       FAMILY HISTORY:  Family History   Problem Relation Age of Onset    Colon Cancer Mother     Breast Cancer No family hx of     Anesthesia Reaction No family hx of     Osteoporosis No family hx of        SOCIAL HISTORY:   Social History     Socioeconomic History    Marital status:    Tobacco Use    Smoking status: Never     Passive exposure: Never    Smokeless tobacco: Never   Vaping Use    Vaping status: Never Used   Substance and Sexual  "Activity    Alcohol use: No    Drug use: No     Social Drivers of Health     Financial Resource Strain: Low Risk  (10/24/2024)    Financial Resource Strain     Within the past 12 months, have you or your family members you live with been unable to get utilities (heat, electricity) when it was really needed?: No   Food Insecurity: Low Risk  (10/24/2024)    Food Insecurity     Within the past 12 months, did you worry that your food would run out before you got money to buy more?: No     Within the past 12 months, did the food you bought just not last and you didn t have money to get more?: No   Transportation Needs: Low Risk  (10/24/2024)    Transportation Needs     Within the past 12 months, has lack of transportation kept you from medical appointments, getting your medicines, non-medical meetings or appointments, work, or from getting things that you need?: No   Physical Activity: Unknown (10/24/2024)    Exercise Vital Sign     Days of Exercise per Week: 4 days   Stress: No Stress Concern Present (10/24/2024)    [place] - Occupational Stress Questionnaire     Feeling of Stress : Only a little   Social Connections: Unknown (10/24/2024)    Social Connection and Isolation Panel [NHANES]     Frequency of Social Gatherings with Friends and Family: Patient declined   Interpersonal Safety: Low Risk  (6/23/2025)    Interpersonal Safety     Do you feel physically and emotionally safe where you currently live?: Yes     Within the past 12 months, have you been hit, slapped, kicked or otherwise physically hurt by someone?: No     Within the past 12 months, have you been humiliated or emotionally abused in other ways by your partner or ex-partner?: No   Housing Stability: Low Risk  (10/24/2024)    Housing Stability     Do you have housing? : Yes     Are you worried about losing your housing?: No       VITALS:  /76   Pulse 63   Temp 97.4  F (36.3  C) (Oral)   Resp 12   Ht 1.549 m (5' 1\") "   Wt 61.7 kg (136 lb)   LMP  (LMP Unknown)   SpO2 98%   BMI 25.70 kg/m      PHYSICAL EXAM:  Physical Exam  Vitals and nursing note reviewed.   Constitutional:       Appearance: Normal appearance.   HENT:      Head: Normocephalic and atraumatic.      Right Ear: External ear normal.      Left Ear: External ear normal.      Nose: Nose normal.      Mouth/Throat:      Mouth: Mucous membranes are moist.   Eyes:      Extraocular Movements: Extraocular movements intact.      Conjunctiva/sclera: Conjunctivae normal.      Pupils: Pupils are equal, round, and reactive to light.   Cardiovascular:      Rate and Rhythm: Normal rate and regular rhythm.   Pulmonary:      Effort: Pulmonary effort is normal.      Breath sounds: Normal breath sounds. No wheezing or rales.   Abdominal:      General: Abdomen is flat. There is no distension.      Palpations: Abdomen is soft.      Tenderness: There is no abdominal tenderness. There is no guarding.   Musculoskeletal:         General: Normal range of motion.      Cervical back: Normal range of motion and neck supple.      Right lower leg: No edema.      Left lower leg: No edema.   Lymphadenopathy:      Cervical: No cervical adenopathy.   Skin:     General: Skin is warm and dry.   Neurological:      General: No focal deficit present.      Mental Status: She is alert and oriented to person, place, and time. Mental status is at baseline.      Comments: No gross focal neurologic deficits   Psychiatric:         Mood and Affect: Mood normal.         Behavior: Behavior normal.         Thought Content: Thought content normal.          LAB:  All pertinent labs reviewed and interpreted.  Results for orders placed or performed during the hospital encounter of 06/26/25   Head CT w/o contrast    Impression    IMPRESSION:  1.  No intracranial hemorrhage, mass lesions, hydrocephalus or CT evidence for an acute infarct.  2.  Mild diffuse cerebral parenchymal volume loss. Presumed chronic  hypertensive/microvascular ischemic white matter changes.     Extra Blue Top Tube   Result Value Ref Range    Hold Specimen JIC    Extra Red Top Tube   Result Value Ref Range    Hold Specimen JIC    Extra Green Top (Lithium Heparin) Tube   Result Value Ref Range    Hold Specimen JIC    Extra Purple Top Tube   Result Value Ref Range    Hold Specimen JIC    Basic metabolic panel   Result Value Ref Range    Sodium 137 135 - 145 mmol/L    Potassium 3.8 3.4 - 5.3 mmol/L    Chloride 103 98 - 107 mmol/L    Carbon Dioxide (CO2) 25 22 - 29 mmol/L    Anion Gap 9 7 - 15 mmol/L    Urea Nitrogen 10.2 8.0 - 23.0 mg/dL    Creatinine 0.81 0.51 - 0.95 mg/dL    GFR Estimate 75 >60 mL/min/1.73m2    Calcium 9.9 8.8 - 10.4 mg/dL    Glucose 96 70 - 99 mg/dL   Hepatic function panel   Result Value Ref Range    Protein Total 7.0 6.4 - 8.3 g/dL    Albumin 4.2 3.5 - 5.2 g/dL    Bilirubin Total 0.7 <=1.2 mg/dL    Alkaline Phosphatase 72 40 - 150 U/L    AST 21 0 - 45 U/L    ALT 15 0 - 50 U/L    Bilirubin Direct 0.20 0.00 - 0.30 mg/dL   Result Value Ref Range    Troponin T, High Sensitivity 8 <=14 ng/L   Result Value Ref Range    Magnesium 1.9 1.7 - 2.3 mg/dL   NT-proBNP   Result Value Ref Range    NT-proBNP 90 0 - 624 pg/mL   Blood gas venous   Result Value Ref Range    pH Venous 7.41 7.32 - 7.43    pCO2 Venous 40 40 - 50 mm Hg    pO2 Venous 35 25 - 47 mm Hg    Bicarbonate Venous 25 21 - 28 mmol/L    Base Excess/Deficit Venous 0.7 -3.0 - 3.0 mmol/L    FIO2 21     Oxyhemoglobin Venous 70 70 - 75 %    O2 Sat, Venous 70.6 70.0 - 75.0 %   TSH with free T4 reflex   Result Value Ref Range    TSH 1.27 0.30 - 4.20 uIU/mL   UA with Microscopic reflex to Culture    Specimen: Urine, Clean Catch   Result Value Ref Range    Color Urine Colorless Colorless, Straw, Light Yellow, Yellow    Appearance Urine Clear Clear    Glucose Urine Negative Negative mg/dL    Bilirubin Urine Negative Negative    Ketones Urine Negative Negative mg/dL    Specific Gravity  Urine 1.003 1.001 - 1.030    Blood Urine Negative Negative    pH Urine 5.0 5.0 - 7.0    Protein Albumin Urine Negative Negative mg/dL    Urobilinogen Urine Normal Normal mg/dL    Nitrite Urine Negative Negative    Leukocyte Esterase Urine 75 Phani/uL (A) Negative    Bacteria Urine Few (A) None Seen /HPF    RBC Urine <1 <=2 /HPF    WBC Urine 6 (H) <=5 /HPF    Squamous Epithelials Urine <1 <=1 /HPF   CBC with platelets and differential   Result Value Ref Range    WBC Count 4.5 4.0 - 11.0 10e3/uL    RBC Count 4.19 3.80 - 5.20 10e6/uL    Hemoglobin 12.6 11.7 - 15.7 g/dL    Hematocrit 36.4 35.0 - 47.0 %    MCV 87 78 - 100 fL    MCH 30.1 26.5 - 33.0 pg    MCHC 34.6 31.5 - 36.5 g/dL    RDW 13.6 10.0 - 15.0 %    Platelet Count 167 150 - 450 10e3/uL    % Neutrophils 65 %    % Lymphocytes 25 %    % Monocytes 8 %    % Eosinophils 1 %    % Basophils 0 %    % Immature Granulocytes 1 %    NRBCs per 100 WBC 0 <1 /100    Absolute Neutrophils 2.9 1.6 - 8.3 10e3/uL    Absolute Lymphocytes 1.1 0.8 - 5.3 10e3/uL    Absolute Monocytes 0.4 0.0 - 1.3 10e3/uL    Absolute Eosinophils 0.1 0.0 - 0.7 10e3/uL    Absolute Basophils 0.0 0.0 - 0.2 10e3/uL    Absolute Immature Granulocytes 0.0 <=0.4 10e3/uL    Absolute NRBCs 0.0 10e3/uL       RADIOLOGY:  Reviewed all pertinent imaging. Please see official radiology report.  Head CT w/o contrast   Final Result   IMPRESSION:   1.  No intracranial hemorrhage, mass lesions, hydrocephalus or CT evidence for an acute infarct.   2.  Mild diffuse cerebral parenchymal volume loss. Presumed chronic hypertensive/microvascular ischemic white matter changes.             I, Jaclyn Granger, am serving as a scribe to document services personally performed by Dr. Jennings based on my observation and the provider's statements to me. I, Harshad Jennings MD attest that Jaclyn Granger is acting in a scribe capacity, has observed my performance of the services and has documented them in accordance with my  direction.    Harshad Jennings M.D.  Emergency Medicine  [facility] EMERGENCY DEPARTMENT  [facility] [facility] [id number] [phone]  Dept: [phone]       Harshad Jennings MD  06/26/25 6523

## 2025-06-26 NOTE — DISCHARGE INSTRUCTIONS
Take nausea medicine as previously prescribed.  Add Pepcid to help with nausea.    Follow-up with your primary care provider in 1 week if needed.

## 2025-06-26 NOTE — ED TRIAGE NOTES
Patient presents here for evaluation of dizziness, nausea, increased thirst and dry. Heart palpations and fatigue. Symptoms have occurred over the past four days. She has an ileostomy with no change in the consistency of the stool output.      Triage Assessment (Adult)       Row Name 06/26/25 0946          Triage Assessment    Airway WDL WDL        Respiratory WDL    Respiratory WDL WDL        Skin Circulation/Temperature WDL    Skin Circulation/Temperature WDL WDL        Cardiac WDL    Cardiac WDL WDL        Peripheral/Neurovascular WDL    Peripheral Neurovascular WDL WDL        Cognitive/Neuro/Behavioral WDL    Cognitive/Neuro/Behavioral WDL WDL

## 2025-06-27 ENCOUNTER — MYC MEDICAL ADVICE (OUTPATIENT)
Dept: FAMILY MEDICINE | Facility: CLINIC | Age: 75
End: 2025-06-27

## 2025-06-27 NOTE — TELEPHONE ENCOUNTER
You can use a hold to get her in next week.  Unless she feel completely fine, she may not need to come in.

## 2025-07-07 LAB
ATRIAL RATE - MUSE: 69 BPM
DIASTOLIC BLOOD PRESSURE - MUSE: 74 MMHG
INTERPRETATION ECG - MUSE: NORMAL
P AXIS - MUSE: 17 DEGREES
PR INTERVAL - MUSE: 160 MS
QRS DURATION - MUSE: 78 MS
QT - MUSE: 412 MS
QTC - MUSE: 441 MS
R AXIS - MUSE: 26 DEGREES
SYSTOLIC BLOOD PRESSURE - MUSE: 132 MMHG
T AXIS - MUSE: 25 DEGREES
VENTRICULAR RATE- MUSE: 69 BPM

## 2025-07-17 ENCOUNTER — MYC MEDICAL ADVICE (OUTPATIENT)
Dept: FAMILY MEDICINE | Facility: CLINIC | Age: 75
End: 2025-07-17

## 2025-07-17 ENCOUNTER — OFFICE VISIT (OUTPATIENT)
Dept: FAMILY MEDICINE | Facility: CLINIC | Age: 75
End: 2025-07-17
Payer: COMMERCIAL

## 2025-07-17 VITALS
DIASTOLIC BLOOD PRESSURE: 64 MMHG | SYSTOLIC BLOOD PRESSURE: 114 MMHG | WEIGHT: 136.8 LBS | RESPIRATION RATE: 16 BRPM | BODY MASS INDEX: 25.83 KG/M2 | TEMPERATURE: 98.8 F | HEIGHT: 61 IN | OXYGEN SATURATION: 100 % | HEART RATE: 63 BPM

## 2025-07-17 DIAGNOSIS — R00.2 PALPITATIONS: Primary | ICD-10-CM

## 2025-07-17 NOTE — PROGRESS NOTES
"Assessment/ Plan     1. Palpitations (Primary)  Lauren comes in today for follow-up of the emergency room.  She is having a hard time describing exactly how she feels but she said multiple times a day she is just not feeling right.  She will feel little fluttering in her heart.  Will start with ruling out an arrhythmia with a Zio patch.  She had blood work in the ER so do not think we need to repeat that.  Further plan pending results.  - ZIO PATCH 8-14 DAYS (additional cost to patient)  - ZIO PATCH 8-14 DAYS APPLICATION      Subjective:      Lauren Dhaliwal is a 74 year old female who presents for follow-up ER visit.  She was seen a few weeks ago because she just was not feeling well.  She felt nausea and she felt her heart was pounding.  She had been at a [employer] service where it was quite hot all day and then she thinks she ate a pizza that had been sitting out for a while.  They did blood work and an EKG and she was sent home.  She states since then intermittently she \"just does not feel well\".  She is a hard time articulating what that feels like for her.  She states multiple times a day she just feels a little dizzy and a little fluttering her heart.  Sometimes laying down makes it improved and sometimes drinking water makes it improved.  She admits that she was eating drinking a lot of soda and eating a lot of chocolate.  In general her diet is markably poor.  She typically eats fast food and junk food.  She states occasionally she gets a little chest pressure and a little bit of shortness of breath but no pattern to that.  She does also take a 50 mg caffeine pill when she gets up in the morning.    The longitudinal plan of care for the diagnosis(es)/condition(s) as documented were addressed during this visit. Due to the added complexity in care, I will continue to support Lauren in the subsequent management and with ongoing continuity of care.     Relevant past medical, family, surgical, and social history " "reviewed with patient, unless noted in HPI, not pertinent for this visit.  Medications were discussed and reconciled.   Review of Systems   A 12 point comprehensive review of systems was negative except as noted.      Current Outpatient Medications   Medication Sig Dispense Refill    alendronate (FOSAMAX) 70 MG tablet TAKE 1 TABLET BY MOUTH ONCE A WEEK IN THE MORNING WITH A FULL GLASS OF WATER 30 MINUTES BEFORE THE FIRST MEAL BEVERAGE OR MEDICATION OF THE 12 tablet 0    ondansetron (ZOFRAN ODT) 4 MG ODT tab Take 1 tablet (4 mg) by mouth every 8 hours as needed for nausea. 8 tablet 0         Objective:     /64   Pulse 63   Temp 98.8  F (37.1  C)   Resp 16   Ht 1.549 m (5' 1\")   Wt 62.1 kg (136 lb 12.8 oz)   LMP  (LMP Unknown)   SpO2 100%   BMI 25.85 kg/m      Body mass index is 25.85 kg/m .       General appearance: alert, appears stated age and cooperative  Lungs: clear to auscultation bilaterally  Heart: regular rate and rhythm, S1, S2 normal, no murmur, click, rub or gallop      No results found for this or any previous visit (from the past week).       This note has been dictated using voice recognition software. Any grammatical or context distortions are unintentional and inherent to the software  "

## 2025-07-17 NOTE — PROGRESS NOTES
Lauren Dhaliwal arrived here on 7/17/2025 2:40 PM for 8-14 Days  Zio monitor placement per ordering provider Dr. Parrish for the diagnosis Palpitations .  Patient s skin was prepped per protocol. Dr. Parrish is the supervising MD.  Zio monitor was placed.  Instructions were reviewed with and given to the patient.  Patient verbalized understanding of wear, troubleshooting and monitor return instructions.

## 2025-07-17 NOTE — TELEPHONE ENCOUNTER
Reach out to patient to discuss.  Patient reports episodic fast heartbeat along with some shortness of breath associated largely patient is asymptomatic and has been feeling fine.  No concerns today certainly no red flag symptoms present..    Did review recent emergency room visit where patient presented for palpitations and nausea the workup was largely unremarkable.  Patient did not immediately follow-up with PCP as it was determined that she could give things some time to see if any symptoms reoccurred which they have    Patient was initially scheduled for a screening mammogram this afternoon however upon further discussion we have decided that patient will cancel that and reschedule and prioritize coming into see PCP for heart workup.    Jesus Washburn RN     [facility]

## 2025-08-07 ENCOUNTER — RESULTS FOLLOW-UP (OUTPATIENT)
Dept: FAMILY MEDICINE | Facility: CLINIC | Age: 75
End: 2025-08-07
Payer: COMMERCIAL

## 2025-08-07 LAB — CV ZIO PRELIM RESULTS: NORMAL

## 2025-08-08 ENCOUNTER — ANCILLARY PROCEDURE (OUTPATIENT)
Dept: MAMMOGRAPHY | Facility: CLINIC | Age: 75
End: 2025-08-08
Attending: FAMILY MEDICINE
Payer: COMMERCIAL

## 2025-08-08 DIAGNOSIS — Z12.31 VISIT FOR SCREENING MAMMOGRAM: ICD-10-CM

## 2025-08-08 PROCEDURE — 77067 SCR MAMMO BI INCL CAD: CPT | Mod: TC | Performed by: RADIOLOGY

## 2025-08-08 PROCEDURE — 77063 BREAST TOMOSYNTHESIS BI: CPT | Mod: TC | Performed by: RADIOLOGY
